# Patient Record
Sex: MALE | Race: WHITE | Employment: OTHER | URBAN - METROPOLITAN AREA
[De-identification: names, ages, dates, MRNs, and addresses within clinical notes are randomized per-mention and may not be internally consistent; named-entity substitution may affect disease eponyms.]

---

## 2017-05-16 PROBLEM — M17.12 ARTHRITIS OF KNEE, LEFT: Status: ACTIVE | Noted: 2017-05-16

## 2021-06-24 ENCOUNTER — OFFICE VISIT (OUTPATIENT)
Dept: PAIN MANAGEMENT | Age: 76
End: 2021-06-24
Payer: MEDICARE

## 2021-06-24 VITALS
HEIGHT: 69 IN | BODY MASS INDEX: 38.51 KG/M2 | DIASTOLIC BLOOD PRESSURE: 62 MMHG | TEMPERATURE: 97.1 F | SYSTOLIC BLOOD PRESSURE: 112 MMHG | WEIGHT: 260 LBS

## 2021-06-24 DIAGNOSIS — G89.4 CHRONIC PAIN SYNDROME: ICD-10-CM

## 2021-06-24 DIAGNOSIS — M17.11 ARTHRITIS OF KNEE, RIGHT: ICD-10-CM

## 2021-06-24 DIAGNOSIS — M17.11 PRIMARY OSTEOARTHRITIS OF RIGHT KNEE: ICD-10-CM

## 2021-06-24 DIAGNOSIS — M47.816 SPONDYLOSIS OF LUMBAR REGION WITHOUT MYELOPATHY OR RADICULOPATHY: ICD-10-CM

## 2021-06-24 PROCEDURE — 1036F TOBACCO NON-USER: CPT | Performed by: NURSE PRACTITIONER

## 2021-06-24 PROCEDURE — 4040F PNEUMOC VAC/ADMIN/RCVD: CPT | Performed by: NURSE PRACTITIONER

## 2021-06-24 PROCEDURE — G8417 CALC BMI ABV UP PARAM F/U: HCPCS | Performed by: NURSE PRACTITIONER

## 2021-06-24 PROCEDURE — 99213 OFFICE O/P EST LOW 20 MIN: CPT | Performed by: NURSE PRACTITIONER

## 2021-06-24 PROCEDURE — G8427 DOCREV CUR MEDS BY ELIG CLIN: HCPCS | Performed by: NURSE PRACTITIONER

## 2021-06-24 PROCEDURE — 1123F ACP DISCUSS/DSCN MKR DOCD: CPT | Performed by: NURSE PRACTITIONER

## 2021-06-24 RX ORDER — OXYCODONE HYDROCHLORIDE AND ACETAMINOPHEN 5; 325 MG/1; MG/1
1 TABLET ORAL 2 TIMES DAILY PRN
Qty: 60 TABLET | Refills: 0 | Status: SHIPPED | OUTPATIENT
Start: 2021-06-29 | End: 2021-07-27 | Stop reason: SDUPTHER

## 2021-06-24 ASSESSMENT — ENCOUNTER SYMPTOMS
SHORTNESS OF BREATH: 0
BLOOD IN STOOL: 0

## 2021-06-24 NOTE — PROGRESS NOTES
Bull Goode  (1945)    2021    Subjective:     Bull Goode is 68 y.o. male who complains today of:    Chief Complaint   Patient presents with    Knee Pain     right     Back Pain         Allergies:  Caffeine, Codeine, Penicillins, and Shellfish-derived products    Past Medical History:   Diagnosis Date    Chicken pox     Chronic back pain     Congenital heart disease     Mumps     Osteoarthritis     Rheumatic fever      Past Surgical History:   Procedure Laterality Date    CARDIAC SURGERY  03/10/2003, 2005    ALSO STENTS WERE PLACED. Family History   Problem Relation Age of Onset    High Blood Pressure Mother     Stroke Mother     High Blood Pressure Father      Social History     Socioeconomic History    Marital status:      Spouse name: Not on file    Number of children: Not on file    Years of education: Not on file    Highest education level: Not on file   Occupational History    Not on file   Tobacco Use    Smoking status: Former Smoker     Types: Cigarettes     Quit date: 3/1/2005     Years since quittin.3    Smokeless tobacco: Never Used   Substance and Sexual Activity    Alcohol use: No     Alcohol/week: 0.0 standard drinks     Comment: RARELY    Drug use: Not on file    Sexual activity: Not on file   Other Topics Concern    Not on file   Social History Narrative    Not on file     Social Determinants of Health     Financial Resource Strain:     Difficulty of Paying Living Expenses:    Food Insecurity:     Worried About [facility] in the Last Year:     [facility] in the Last Year:    Transportation Needs:     Lack of Transportation (Medical):      Lack of Transportation (Non-Medical):    Physical Activity:     Days of Exercise per Week:     Minutes of Exercise per Session:    Stress:     Feeling of Stress :    Social Connections:     Frequency of Communication with Friends and Family:     Frequency of Social Gatherings with Friends and Family:     Attends Mosque Services:     Active Member of Clubs or Organizations:     Attends Club or Organization Meetings:     Marital Status:    Intimate Partner Violence:     Fear of Current or Ex-Partner:     Emotionally Abused:     Physically Abused:     Sexually Abused:        Current Outpatient Medications on File Prior to Visit   Medication Sig Dispense Refill    gabapentin (NEURONTIN) 300 MG capsule Take 1 capsule by mouth nightly for 90 days. 90 capsule 0    Clopidogrel Bisulfate (PLAVIX PO) Take by mouth      Handicap Placard MISC by Does not apply route Valid 9/1/19- 9/1/23 1 each 0    allopurinol (ZYLOPRIM) 100 MG tablet Take 200 mg by mouth daily      aspirin 81 MG tablet Take 81 mg by mouth daily      Omega-3 Fatty Acids (FISH OIL) 1000 MG CPDR Take 2 tablets by mouth 2 times daily      NONFORMULARY as needed Indications: METALAZONE 25MG  1 TAB PRN      omeprazole (PRILOSEC) 20 MG capsule Take 20 mg by mouth daily      ezetimibe-simvastatin (VYTORIN) 10-20 MG per tablet Take 1 tablet by mouth nightly      carvedilol (COREG) 25 MG tablet       ONE TOUCH ULTRA TEST strip   0    ONETOUCH DELICA LANCETS 31C MISC   0    metFORMIN (GLUCOPHAGE) 1000 MG tablet       montelukast (SINGULAIR) 10 MG tablet       NITROSTAT 0.4 MG SL tablet   0    potassium chloride SA (K-DUR;KLOR-CON M) 20 MEQ tablet       spironolactone (ALDACTONE) 25 MG tablet   0    torsemide (DEMADEX) 100 MG tablet        No current facility-administered medications on file prior to visit. Pt presents today for a f/u of chronic low back and right knee pain. Pt feels pain level and functioning improves with prescribed medications and is able to walk without cane. Pt feels that walking aggravates the pain. Pt denies radiating numbness and tingling. Will be getting EGD for previous internal bleeding. Gel injections with Dr. Candelaria Fine in past.  He says he did see ortho.  Cannot have MRI. Told not much that can be done for knee. He says has knee brace and cane on occasion. had cardiac valve repair in July 2020. He has tried Rt SI joint and facet joint injections in the past with relief at that time. Has ICD which he says will need replaced due to low battery. 4/5/21 BL gelone knees              Review of Systems   Constitutional: Negative for appetite change and fever. HENT: Negative for hearing loss. Eyes: Negative for visual disturbance. Respiratory: Negative for shortness of breath. Gastrointestinal: Negative for blood in stool. Genitourinary: Negative for difficulty urinating and hematuria. Musculoskeletal: Positive for arthralgias. Skin: Negative for rash. Neurological: Negative for facial asymmetry. Hematological: Negative for adenopathy. Psychiatric/Behavioral: Negative for self-injury. All other systems reviewed and are negative. Objective:     Vitals:  /62   Temp 97.1 °F (36.2 °C)   Ht 5' 8.5\" (1.74 m)   Wt 260 lb (117.9 kg)   BMI 38.96 kg/m² Pain Score:   8      Physical Exam  Vitals and nursing note reviewed. Pt is alert and oriented x 3. Recent and remote memory is intact. Mood, judgement and affect are normal.  No signs of distress or SOB noted. Visualized skin intact. Sensation intact to light touch. Decreased ROM with flexion and extension of low back.  Tender with palpation to bilateral lumbar spine with positive provacative maneuvers noted.  Negative SLR.   Pt is unable to briefly heel walk and toe walk.   Strength, balance, and coordination are diminished but functional for ambulation. Left knee with decreased ROM.  Tenderness noted with palpation. Mild swelling noted with arthritic deformity noted.  Crepitus with ROM.  Gait antalgic.  Uses cane. Assessment:      Diagnosis Orders   1.  Primary osteoarthritis of right knee  [facility] Physical Therapy [facility]    oxyCODONE-acetaminophen (PERCOCET) 5-325 MG per tablet    CHG DRUG/SUBSTANCE DEFINITIVE QUAL/QUANT NOS 7/MORE   2. Spondylosis of lumbar region without myelopathy or radiculopathy  [facility] Physical [facility]    oxyCODONE-acetaminophen (PERCOCET) 5-325 MG per tablet    CHG DRUG/SUBSTANCE DEFINITIVE QUAL/QUANT NOS 7/MORE   3. Arthritis of knee, right  oxyCODONE-acetaminophen (PERCOCET) 5-325 MG per tablet    CHG DRUG/SUBSTANCE DEFINITIVE QUAL/QUANT NOS 7/MORE   4. Chronic pain syndrome  oxyCODONE-acetaminophen (PERCOCET) 5-325 MG per tablet    CHG DRUG/SUBSTANCE DEFINITIVE QUAL/QUANT NOS 7/MORE       Plan:     Periodic Controlled Substance Monitoring: Possible medication side effects, risk of tolerance/dependence & alternative treatments discussed., No signs of potential drug abuse or diversion identified. , Assessed functional status., Obtaining appropriate analgesic effect of treatment. (Juju Signh, APRN - CNP)    Orders Placed This Encounter   Medications    oxyCODONE-acetaminophen (PERCOCET) 5-325 MG per tablet     Sig: Take 1 tablet by mouth 2 times daily as needed for Pain for up to 30 days. Dispense:  60 tablet     Refill:  0     Reduce doses taken as pain becomes manageable       Orders Placed This Encounter   Procedures   [facility]     Referral Priority:   Routine     Referral Type:   Eval and Treat     Referral Reason:   Specialty Services Required     Requested Specialty:   Physical Therapy     Number of Visits Requested:   1    CHG DRUG/SUBSTANCE DEFINITIVE QUAL/QUANT NOS 7/MORE     Discussed options with the patient today. Continue Percocet and Gabapentin (has 90 day RX).  Will reorder PT for lumbar and knee. Routine ODS today. Took 1 percocet today and 2 yesterday. Will be seeing cardiology in September to see if pacemaker battery needs replaced. All questions were answered. Pt verbalized understanding and agrees with above plan. Utox reviewed and appropriate from 6/18/20.  Narcan declined 4/29/21.     Will

## 2021-07-20 ENCOUNTER — HOSPITAL ENCOUNTER (OUTPATIENT)
Dept: PHYSICAL THERAPY | Age: 76
Setting detail: THERAPIES SERIES
Discharge: HOME | End: 2021-07-20
Payer: MEDICARE

## 2021-07-20 PROCEDURE — 97162 PT EVAL MOD COMPLEX 30 MIN: CPT

## 2021-07-20 PROCEDURE — 97110 THERAPEUTIC EXERCISES: CPT

## 2021-07-20 ASSESSMENT — PAIN DESCRIPTION - FREQUENCY: FREQUENCY: INTERMITTENT

## 2021-07-20 ASSESSMENT — PAIN DESCRIPTION - DESCRIPTORS: DESCRIPTORS: STABBING

## 2021-07-20 ASSESSMENT — PAIN DESCRIPTION - ORIENTATION: ORIENTATION: RIGHT;LOWER

## 2021-07-20 ASSESSMENT — PAIN SCALES - GENERAL: PAINLEVEL_OUTOF10: 6

## 2021-07-20 ASSESSMENT — PAIN DESCRIPTION - LOCATION: LOCATION: BACK

## 2021-07-20 NOTE — PROGRESS NOTES
[address], [address] [place] [address]  QZGIF:[phone]    [x] Certification  [] Recertification [x]  Plan of Care  [] Progress Note [] Discharge      To:  FRANK Arango - AMBROSIO      From:  Kyree Tucker PT  Patient: Clementina Bettencourt     : 1945  Diagnosis: Lumbar spondylosis and R knee OA     Date: 2021  Treatment Diagnosis: LBP with R knee pain and strengt and ROM deficits. Plan of Care/Certification Expiration Date: 21  Progress Report Period from:  2021  to 2021    Total # of Visits to Date: 1   No Show: 0    Canceled Appointment: 0     OBJECTIVE:   Short Term Goals - Time Frame for Short term goals: 2 weeks    Goals Current/Discharge status  Met   Short term goal 1: Decrease lumbar pain 50% to assist with improved functional gains. [] yes  [] no     Long Term Goals - Time Frame for Long term goals : 4-6 weeks  Goals Current/ Discharge status Met   Long term goal 1: Indep HEP for symptom management Written HEP provided  for symptom management   [] yes  [x] no   Long term goal 2: Pt demo improved overall function by reporting greater than 80% per functional survey score Exam: LEFS 23/80= 71% functional   [] yes  [x] no   Long term goal 3: Improve B hip strength 4-/5 to 4/5 to allow patient to perfrom bed mobility with min to no assist. Strength RLE  Comment: Hip 3- to 3/5, knee 4-/5, ankle 4/5  Strength LLE  Comment: Hip 3/5, knee 4-/5, ankle 4/5    [] yes  [x] no   Long term goal 4: Pain-free lumbar AROM to 50% WNL allowing an increase in ADL tolerance. Spine  Lumbar: Flex <25% with pain, Ext <25% with pain, SB 50% with pain on L to the L SB    [] yes  [x] no   Long term goal 5: Pain-free R knee AROM to 0-125 deg  WNL allowing an increase in ADL tolerance.     AROM RLE (degrees)  RLE General AROM: Hip flex 60, knee 0-110, ankle WFL [] yes  [x] no        Body structures, Functions, Activity limitations: Decreased ROM, Decreased strength, Increased pain, Decreased posture  Assessment: The pt's impairments currently limit functional abilities by 29% including his abilities to peform bed mobility, ambulate >5-10  minutes, perform recreational activities, and perform household/work related duties without pain or limitations. Skilled PT required to address above deficits to improve over function and return to prior level of function. Special Tests: SLUMP(-), SLR(pain L>R), KRISTINA(tightness), ELY's(unable to positioin), Crossed SLR(), Scour(pain B), distraction (increase pain),  compression(no change), Knee varus stress(-), valgus stress(-), Macey's(pain), Apleys compression (-)  Prognosis: Good  Discharge Recommendations: Continue to assess pending progress      PT Education: Goals;PT Role;Plan of Care;Home Exercise Program  Patient Education: Diana Lopez provided    PLAN: [x] Evaluate and Treat  Frequency/Duration:  Plan  Times per week: 2  Plan weeks: 4-6  Current Treatment Recommendations: Strengthening, ROM, Home Exercise Program, Manual Therapy - Soft Tissue Mobilization, Modalities     Precautions: cardiac: Pacemaker, MI x 2, stents                          Patient Status:[x] Continue/ Initiate plan of Care    [] Discharge PT. Recommend pt continue with HEP. [] Additional visits requested, Please re-certify for additional visits:          Signature: Electronically signed by Juliana Bui PT on 7/20/21 at 3:06 PM EDT      If you have any questions or concerns, please don't hesitate to call. Thank you for your referral.    I have reviewed this plan of care and certify a need for medically necessary rehabilitation services.     Physician Signature:__________________________________________________________  Date:  Please sign and return

## 2021-07-20 NOTE — PROGRESS NOTES
[facility] ([facility]) Physical Therapy-  [place]  PHYSICAL THERAPY EVALUATION    Date: 2021  Patient Name: Sallie Fierro       MRN: [id number]   Account: [de-identified]   : 1945  (68 y.o.)   Gender: male   Referring Practitioner: FRANK Roque CNP                 Diagnosis: Lumbar spondylosis and R knee OA  Treatment Diagnosis: LBP with R knee pain and strengt and ROM deficits. Additional Pertinent Hx: OA, cardiac stents, pacemaker, DM, COPD, carpel tunnel, MI x 2          Past Medical History:  has a past medical history of Chicken pox, Chronic back pain, Congenital heart disease, Mumps, Osteoarthritis, and Rheumatic fever. Past Surgical History:   has a past surgical history that includes Cardiac surgery (03/10/2003, 2005). Vital Signs  Patient Currently in Pain: Yes   Pain Screening  Patient Currently in Pain: Yes  Pain Assessment  Pain Assessment: 0-10  Pain Level: 6 (Pt reports 10/10 back pain with standing </=10-15 min. R knee 5/10 at rest, increases with pain.)  Pain Location: Back  Pain Orientation: Right; Lower  Pain Radiating Towards: denies NT or radicular symptoms. Pain Descriptors: Stabbing  Pain Frequency: Intermittent  Functional Pain Assessment:  (25% function currently x 1 year. Pt reports 50% function 1 year ago.)  Non-Pharmaceutical Pain Intervention(s): Cold applied      Lives With: Friend(s)  Home Layout: Two level  Home Access: Stairs to enter with rails  Entrance Stairs - Number of Steps: 9 to bedroom downstairs  Bathroom Shower/Tub: Tub/Shower unit  Home Equipment: Rolling walker;Cane  ADL Assistance: Independent  Homemaking Assistance: Independent  Homemaking Responsibilities: Yes  Ambulation Assistance: Independent  Transfer Assistance: Independent  Active : Yes  Mode of Transportation: Car  Occupation: Retired  Leisure & Hobbies: fair and steam shows,      Subjective:  Subjective: Pt reports long hx of back pain and R knee pain.   Cortisone injection R cupping*  Other: IDN contraindicated for 4 weeks after RFA 7/21/21  *Indicates exercise,modality, or manual techniques to be initiated when appropriate  Assessment: Body structures, Functions, Activity limitations: Decreased ROM, Decreased strength, Increased pain, Decreased posture  Assessment: The pt's impairments currently limit functional abilities by 29% including his abilities to peform bed mobility, ambulate >5-10  minutes, perform recreational activities, and perform household/work related duties without pain or limitations. Skilled PT required to address above deficits to improve over function and return to prior level of function. Prognosis: Good  Discharge Recommendations: Continue to assess pending progress        Decision Making: Medium Complexity  History: OA, cardiac stents, MI x 2, COPD, DM, Pacemaker  Exam: LEFS 23/80= 71% functional  Clinical Presentation: evolving        Plan  Frequency/Duration:  Plan  Times per week: 2  Plan weeks: 4-6  Current Treatment Recommendations: Strengthening, ROM, Home Exercise Program, Manual Therapy - Soft Tissue Mobilization, Modalities     Patient Education  New Education Provided: PT Education: Goals;PT Role;Plan of Care;Home Exercise Program  Patient Education: Rochelle Jacobs provided    POST-PAIN     Pain Rating (0-10 pain scale):  0 /10  Location and pain description same as pre-treatment unless indicated. Action: [x] NA  [] Call Physician  [] Perform HEP  [] Meds as prescribed    Evaluation and patient rights have been reviewed and patient agrees with plan of care.   Yes  [x]  No  []   Explain:       Collins Fall Risk Assessment  Risk Factor Scale  Score   History of Falls [] Yes  [] No 25  0 0   Secondary Diagnosis [] Yes  [x] No 15  0 0   Ambulatory Aid [] Furniture  [] Crutches/cane/walker  [x] None/bedrest/wheelchair/nurse 30  15  0 0   IV/Heparin Lock [] Yes  [x] No 20  0 0   Gait/Transferring [] Impaired  [x] Weak  [] Normal/bedrest/immobile 20  10  0 10 Mental Status [] Forgets limitations  [x] Oriented to own ability 15  0 0      Total:10     Based on the Assessment score: check the appropriate box. [x]  No intervention needed   Low =   Score of 0-24  []  Use standard prevention interventions Moderate =  Score of 24-44   [] Discuss fall prevention strategies   [] Indicate moderate falls risk on eval  []  Use high risk prevention interventions High = Score of 45 and higher   [] Discuss fall prevention strategies   [] Provide supervision during treatment time    Goals  Short term goals  Time Frame for Short term goals: 2 weeks  Short term goal 1: Decrease lumbar pain 50% to assist with improved functional gains. Long term goals  Time Frame for Long term goals : 4-6 weeks  Long term goal 1: Indep HEP for symptom management  Long term goal 2: Pt demo improved overall function by reporting greater than 80% per functional survey score  Long term goal 3: Improve B hip strength 4-/5 to 4/5 to allow patient to perfrom bed mobility with min to no assist.  Long term goal 4: Pain-free lumbar AROM to 50% WNL allowing an increase in ADL tolerance. Long term goal 5: Pain-free R knee AROM to 0-125 deg  WNL allowing an increase in ADL tolerance.          PT Individual Minutes  Time In: 4864  Time Out: 1355  Minutes: 43  Timed Code Treatment Minutes: 8 Minutes  Procedure Minutes:35 min eval     Timed Activity Minutes Units   Ther Ex 8 1       Electronically signed by Shine Ledbetter PT on 7/20/21 at 2:55 PM EDT

## 2021-07-27 ENCOUNTER — HOSPITAL ENCOUNTER (OUTPATIENT)
Dept: PHYSICAL THERAPY | Age: 76
Setting detail: THERAPIES SERIES
Discharge: HOME | End: 2021-07-27
Payer: MEDICARE

## 2021-07-27 ENCOUNTER — OFFICE VISIT (OUTPATIENT)
Dept: PAIN MANAGEMENT | Age: 76
End: 2021-07-27
Payer: MEDICARE

## 2021-07-27 VITALS
WEIGHT: 260 LBS | DIASTOLIC BLOOD PRESSURE: 60 MMHG | BODY MASS INDEX: 39.4 KG/M2 | HEIGHT: 68 IN | SYSTOLIC BLOOD PRESSURE: 116 MMHG | TEMPERATURE: 97.3 F

## 2021-07-27 DIAGNOSIS — M17.11 PRIMARY OSTEOARTHRITIS OF RIGHT KNEE: ICD-10-CM

## 2021-07-27 DIAGNOSIS — G89.4 CHRONIC PAIN SYNDROME: ICD-10-CM

## 2021-07-27 DIAGNOSIS — M47.817 LUMBOSACRAL SPONDYLOSIS WITHOUT MYELOPATHY: ICD-10-CM

## 2021-07-27 DIAGNOSIS — M17.11 ARTHRITIS OF KNEE, RIGHT: Primary | ICD-10-CM

## 2021-07-27 DIAGNOSIS — M47.816 SPONDYLOSIS OF LUMBAR REGION WITHOUT MYELOPATHY OR RADICULOPATHY: ICD-10-CM

## 2021-07-27 PROCEDURE — 97110 THERAPEUTIC EXERCISES: CPT

## 2021-07-27 PROCEDURE — G8427 DOCREV CUR MEDS BY ELIG CLIN: HCPCS | Performed by: PAIN MEDICINE

## 2021-07-27 PROCEDURE — 99214 OFFICE O/P EST MOD 30 MIN: CPT | Performed by: PAIN MEDICINE

## 2021-07-27 PROCEDURE — 4040F PNEUMOC VAC/ADMIN/RCVD: CPT | Performed by: PAIN MEDICINE

## 2021-07-27 PROCEDURE — 1123F ACP DISCUSS/DSCN MKR DOCD: CPT | Performed by: PAIN MEDICINE

## 2021-07-27 PROCEDURE — 1036F TOBACCO NON-USER: CPT | Performed by: PAIN MEDICINE

## 2021-07-27 PROCEDURE — G8417 CALC BMI ABV UP PARAM F/U: HCPCS | Performed by: PAIN MEDICINE

## 2021-07-27 RX ORDER — OXYCODONE HYDROCHLORIDE AND ACETAMINOPHEN 5; 325 MG/1; MG/1
1 TABLET ORAL 2 TIMES DAILY PRN
Qty: 60 TABLET | Refills: 0 | Status: SHIPPED | OUTPATIENT
Start: 2021-07-27 | End: 2021-08-26 | Stop reason: SDUPTHER

## 2021-07-27 ASSESSMENT — ENCOUNTER SYMPTOMS
NAUSEA: 0
SHORTNESS OF BREATH: 0
CONSTIPATION: 0
BACK PAIN: 0
DIARRHEA: 0

## 2021-07-27 ASSESSMENT — PAIN DESCRIPTION - FREQUENCY: FREQUENCY: INTERMITTENT

## 2021-07-27 ASSESSMENT — PAIN DESCRIPTION - PROGRESSION: CLINICAL_PROGRESSION: NOT CHANGED

## 2021-07-27 ASSESSMENT — PAIN DESCRIPTION - DESCRIPTORS: DESCRIPTORS: STABBING

## 2021-07-27 ASSESSMENT — PAIN DESCRIPTION - PAIN TYPE: TYPE: CHRONIC PAIN

## 2021-07-27 ASSESSMENT — PAIN DESCRIPTION - LOCATION: LOCATION: BACK;KNEE

## 2021-07-27 ASSESSMENT — PAIN DESCRIPTION - ONSET: ONSET: AWAKENED FROM SLEEP

## 2021-07-27 ASSESSMENT — PAIN SCALES - GENERAL: PAINLEVEL_OUTOF10: 4

## 2021-07-27 ASSESSMENT — PAIN DESCRIPTION - ORIENTATION: ORIENTATION: RIGHT;LOWER

## 2021-07-27 NOTE — PROGRESS NOTES
Pauly Carlson  [address]  DQJOH:[phone]        Date: 2021  Patient: Jud Hobson  : 1945  ACCT #: [de-identified]  Referring Practitioner: FRANK Zamora CNP  Diagnosis: Lumbar spondylosis and R knee OA  Treatment Diagnosis: LBP with R knee pain and strengt and ROM deficits. Visit Information:  PT Visit Information  Onset Date: 20 (Pt reports long history and worsening over the past year)  PT Insurance Information: Medicare/Medical [place]  Total # of Visits Approved:  (BMN)  Total # of Visits to Date: 2  Plan of Care/Certification Expiration Date: 21  No Show: 0  Canceled Appointment: 0  Progress Note Counter:  (PN 21)    Subjective: Pt states he is not doing bad today but he has not been on his feet today. The other day it felt like someone was stabbing in his right knee(10/10 pain) but he was on his feet a lot. He c/o chronic low back pain with right side worse than L. Comments: xray=R knee moderate to severe medial patellofemoral OA  HEP Compliance:  [] Good [x] Fair [] Poor [] Reports not doing due to:    Vital Signs  Patient Currently in Pain: Yes   Pain Screening  Patient Currently in Pain: Yes  Pain Assessment  Pain Assessment: 0-10  Pain Level: 4  Pain Type: Chronic pain  Pain Location: Back;Knee  Pain Orientation: Right; Lower  Pain Descriptors: Stabbing  Pain Frequency: Intermittent  Pain Onset: Awakened from sleep  Clinical Progression: Not changed    OBJECTIVE:   Exercises  Exercise 2: ham stretch 5 x 20 secs  Exercise 3: LTR x 10 each, 5 secs  Exercise 4: SLR 2 x 5, 5 secs  Exercise 5: bridging x 10, 5 secs        Strength: [x] NT  [] MMT completed:     ROM: [x] NT  [] ROM measurements:      *Indicates exercise, modality, or manual techniques to be initiated when appropriate    Assessment:         Body structures, Functions, Activity limitations: Decreased ROM, Decreased strength, Increased pain, Decreased posture  Assessment: Instructed pt in gentle lumbar ROM and knee stability exercises. He tolerated SLR and bridging exercises to increase core and LE strength. C/o fatigue noted after completing listed exercises. Will work towards improving function and strength. Treatment Diagnosis: LBP with R knee pain and strengt and ROM deficits. Prognosis: Good       Goals:  Short term goals  Time Frame for Short term goals: 2 weeks  Short term goal 1: Decrease lumbar pain 50% to assist with improved functional gains. Long term goals  Time Frame for Long term goals : 4-6 weeks  Long term goal 1: Indep HEP for symptom management  Long term goal 2: Pt demo improved overall function by reporting greater than 80% per functional survey score  Long term goal 3: Improve B hip strength 4-/5 to 4/5 to allow patient to perfrom bed mobility with min to no assist.  Long term goal 4: Pain-free lumbar AROM to 50% WNL allowing an increase in ADL tolerance. Long term goal 5: Pain-free R knee AROM to 0-125 deg  WNL allowing an increase in ADL tolerance. Progress toward goals: Initiated PT POC this visit. POST-PAIN       Pain Rating (0-10 pain scale):   4/10   Location and pain description same as pre-treatment unless indicated. Action: [] NA   [x] Perform HEP  [x] Meds as prescribed  [] Modalities as prescribed   [] Call Physician     Frequency/Duration:  Plan  Times per week: 2  Plan weeks: 4-6  Current Treatment Recommendations: Strengthening, ROM, Home Exercise Program, Manual Therapy - Soft Tissue Mobilization, Modalities     Pt to continue current HEP. See objective section for any therapeutic exercise changes, additions or modifications this date.     PT Individual Minutes  Time In: 4249  Time Out: 1501  Minutes: 29  Timed Code Treatment Minutes: 29 Minutes     Timed Activity Minutes Units   Ther Ex 29 2     Signature:  Electronically signed by Arpit Abarca PTA on 7/27/21 at 2:39 PM EDT

## 2021-07-27 NOTE — PROGRESS NOTES
[facility]) Physicians  Neurosurgery and Pain [facility]  [address] [facility], [place], [address] [facility] , [address] [facility], [address]: [phone]  F: [phone]        Joellen Cedeno  (1945)    7/27/2021    Subjective:     Joellen Cedeno is 68 y.o. male who complains today of:     Chief Complaint   Patient presents with    Knee Pain    Shoulder Pain    Back Pain   Sandie Hector is here today for follow-up. He is having n a lot of right knee pain. Worse with standing walking. Better when he sits. He has chronic low back pain worse with bending activity. Achiness spasms. He has had conservative treatment in the past including interventional pain management. On Percocet twice a day for his chronic pain. A lot of pain in the right inside aspect of the knee. He has arthritis. Has multiple medical problems. Has a knee brace and uses a cane occasionally no side effects no aberrant behavior the Percocet. To help him function, do his activities daily living, help with quality life. Pain with meds 3 out of 10 without 8 out of 10. He cannot have an MRI. Achy pain. Plan: We discussed options in detail. We'll set him up for right knee injection. Continue Percocet 5 mg twice a day #60 for his chronic pain. He has failed conservative treatment past. Question answered, chart reviewed, continue home excise program.      Allergies:  Caffeine, Codeine, Penicillins, and Shellfish-derived products    Past Medical History:   Diagnosis Date    Chicken pox     Chronic back pain     Congenital heart disease     Mumps     Osteoarthritis     Rheumatic fever      Past Surgical History:   Procedure Laterality Date    CARDIAC SURGERY  03/10/2003, 03/11/2005    ALSO STENTS WERE PLACED.       Family History   Problem Relation Age of Onset    High Blood Pressure Mother     Stroke Mother     High Blood Pressure Father      Social History     Socioeconomic History    Marital status:      Spouse name: Not on file  Number of children: Not on file    Years of education: Not on file    Highest education level: Not on file   Occupational History    Not on file   Tobacco Use    Smoking status: Former Smoker     Packs/day: 0.50     Years: 45.00     Pack years: 22.50     Types: Cigarettes     Quit date: 3/1/2005     Years since quittin.4    Smokeless tobacco: Never Used   Substance and Sexual Activity    Alcohol use: No     Alcohol/week: 0.0 standard drinks     Comment: RARELY    Drug use: Not on file    Sexual activity: Not on file   Other Topics Concern    Not on file   Social History Narrative    Not on file     Social Determinants of Health     Financial Resource Strain:     Difficulty of Paying Living Expenses:    Food Insecurity:     Worried About Running Out of Food in the Last Year:     [address] in the Last Year:    Transportation Needs:     Lack of Transportation (Medical):  Lack of Transportation (Non-Medical):    Physical Activity:     Days of Exercise per Week:     Minutes of Exercise per Session:    Stress:     Feeling of Stress :    Social Connections:     Frequency of Communication with Friends and Family:     Frequency of Social Gatherings with Friends and Family:     Attends Mandaeism Services:     Active Member of Clubs or Organizations:     Attends Club or Organization Meetings:     Marital Status:    Intimate Partner Violence:     Fear of Current or Ex-Partner:     Emotionally Abused:     Physically Abused:     Sexually Abused:        Current Outpatient Medications on File Prior to Visit   Medication Sig Dispense Refill    gabapentin (NEURONTIN) 300 MG capsule Take 1 capsule by mouth nightly for 90 days.  90 capsule 0    Clopidogrel Bisulfate (PLAVIX PO) Take by mouth      Handicap Placard MISC by Does not apply route Valid 19- 23 1 each 0    allopurinol (ZYLOPRIM) 100 MG tablet Take 200 mg by mouth daily      aspirin 81 MG tablet Take 81 mg by mouth daily      Omega-3 Fatty Acids (FISH OIL) 1000 MG CPDR Take 2 tablets by mouth 2 times daily      NONFORMULARY as needed Indications: METALAZONE 25MG  1 TAB PRN      omeprazole (PRILOSEC) 20 MG capsule Take 20 mg by mouth daily      ezetimibe-simvastatin (VYTORIN) 10-20 MG per tablet Take 1 tablet by mouth nightly      carvedilol (COREG) 25 MG tablet       ONE TOUCH ULTRA TEST strip   0    ONETOUCH DELICA LANCETS 63M MISC   0    metFORMIN (GLUCOPHAGE) 1000 MG tablet       montelukast (SINGULAIR) 10 MG tablet       NITROSTAT 0.4 MG SL tablet   0    potassium chloride SA (K-DUR;KLOR-CON M) 20 MEQ tablet       spironolactone (ALDACTONE) 25 MG tablet   0    torsemide (DEMADEX) 100 MG tablet        No current facility-administered medications on file prior to visit. HPI    Review of Systems   Constitutional: Negative for fever. HENT: Negative for hearing loss. Respiratory: Negative for shortness of breath. Gastrointestinal: Negative for constipation, diarrhea and nausea. Genitourinary: Negative for difficulty urinating. Musculoskeletal: Negative for back pain and neck pain. Skin: Negative for rash. Neurological: Negative for headaches. Hematological: Does not bruise/bleed easily. Psychiatric/Behavioral: Negative for sleep disturbance. Objective:     Vitals:  /60   Temp 97.3 °F (36.3 °C)   Ht 5' 8\" (1.727 m)   Wt 260 lb (117.9 kg)   BMI 39.53 kg/m² Pain Score:   7      Physical Exam  Patient alert and oriented times three, recent and remote memory intact, mood and affect normal, judgement and insight normal. Strength functional for ambulation. Balance and coordinational functional. Visualized skin intact. No visualized cyanosis, pulses intact. Cranial nerves 2-12 grossly intact. No obvious upper motor neuron signs seen. Sensation intact to light touch.  Tender right medial knee, decreased range of motion, antalgic gait.-slr, decreased range of motion

## 2021-07-29 ENCOUNTER — HOSPITAL ENCOUNTER (OUTPATIENT)
Dept: PHYSICAL THERAPY | Age: 76
Setting detail: THERAPIES SERIES
Discharge: HOME | End: 2021-07-29
Payer: MEDICARE

## 2021-07-29 PROCEDURE — 97110 THERAPEUTIC EXERCISES: CPT

## 2021-07-29 ASSESSMENT — PAIN DESCRIPTION - DESCRIPTORS: DESCRIPTORS: STABBING

## 2021-07-29 ASSESSMENT — PAIN - FUNCTIONAL ASSESSMENT: PAIN_FUNCTIONAL_ASSESSMENT: PREVENTS OR INTERFERES SOME ACTIVE ACTIVITIES AND ADLS

## 2021-07-29 ASSESSMENT — PAIN DESCRIPTION - ONSET: ONSET: ON-GOING

## 2021-07-29 ASSESSMENT — PAIN DESCRIPTION - FREQUENCY: FREQUENCY: INTERMITTENT

## 2021-07-29 ASSESSMENT — PAIN DESCRIPTION - PAIN TYPE: TYPE: CHRONIC PAIN

## 2021-07-29 ASSESSMENT — PAIN SCALES - GENERAL: PAINLEVEL_OUTOF10: 4

## 2021-07-29 ASSESSMENT — PAIN DESCRIPTION - LOCATION: LOCATION: BACK;KNEE

## 2021-07-29 ASSESSMENT — PAIN DESCRIPTION - ORIENTATION: ORIENTATION: RIGHT;LOWER

## 2021-07-29 ASSESSMENT — PAIN DESCRIPTION - PROGRESSION: CLINICAL_PROGRESSION: NOT CHANGED

## 2021-07-29 NOTE — PROGRESS NOTES
Andrea Carlson  [address]  IVVHV:[phone]        Date: 2021  Patient: Jeniffer Bailey  : 1945  ACCT #: [de-identified]  Referring Practitioner: FRANK Stokes CNP  Diagnosis: Lumbar spondylosis and R knee OA  Treatment Diagnosis: LBP with R knee pain and strengt and ROM deficits. Visit Information:  PT Visit Information  Onset Date: 20  PT Insurance Information: Medicare/Medical [place]  Total # of Visits Approved:  (BMN)  Total # of Visits to Date: 3  Plan of Care/Certification Expiration Date: 21  No Show: 0  Canceled Appointment: 0  Progress Note Counter: 3/8 (PN 21)    Subjective: Pt notes he felt fine after first session. He c/o increased R shoulder pain today, not sure why(8/10 pain). Low back and knee doing okay. Comments: xray=R knee moderate to severe medial patellofemoral OA  HEP Compliance:  [] Good [x] Fair [] Poor [] Reports not doing due to:    Vital Signs  Patient Currently in Pain: Yes   Pain Screening  Patient Currently in Pain: Yes  Pain Assessment  Pain Level: 4  Pain Type: Chronic pain  Pain Location: Back;Knee  Pain Orientation: Right; Lower (R knee)  Pain Descriptors: Stabbing  Pain Frequency: Intermittent  Pain Onset: On-going  Clinical Progression: Not changed  Functional Pain Assessment: Prevents or interferes some active activities and ADLs    OBJECTIVE:   Exercises  Exercise 2: ham stretch 5 x 20 secs  Exercise 3: LTR x 10 each, 5 secs  Exercise 4: SLR 2 x 5, 5 secs  Exercise 5: bridging x 10, 5 secs  Exercise 6: hip abduction x 15, 5 secs Blue T band  Exercise 8: mini squats with UE support x 10, 3 secs       Strength: [x] NT  [] MMT completed:        ROM: [x] NT  [] ROM measurements:      *Indicates exercise, modality, or manual techniques to be initiated when appropriate    Assessment:         Body structures, Functions, Activity limitations: Decreased ROM, Decreased strength, Increased pain, Decreased posture  Assessment: Reviewed previous lumbar-LE ther ex this session. Pt performing gentle stretches such as LTR and hamstring stretch for improving mobility. He demonstrates decreased hamstring length, most notably on R LE. He was able to progress to closed kinetic chain exercises to increase hip and LE strength. Reported fatigue after completing listed exercises. Treatment Diagnosis: LBP with R knee pain and strengt and ROM deficits. Prognosis: Good       Goals:  Short term goals  Time Frame for Short term goals: 2 weeks  Short term goal 1: Decrease lumbar pain 50% to assist with improved functional gains. Long term goals  Time Frame for Long term goals : 4-6 weeks  Long term goal 1: Indep HEP for symptom management  Long term goal 2: Pt demo improved overall function by reporting greater than 80% per functional survey score  Long term goal 3: Improve B hip strength 4-/5 to 4/5 to allow patient to perfrom bed mobility with min to no assist.  Long term goal 4: Pain-free lumbar AROM to 50% WNL allowing an increase in ADL tolerance. Long term goal 5: Pain-free R knee AROM to 0-125 deg  WNL allowing an increase in ADL tolerance. Progress toward goals: Pt completed SLR and bridging exercises for improving hip strength and meeting long term goal 3. POST-PAIN       Pain Rating (0-10 pain scale):   4/10   Location and pain description same as pre-treatment unless indicated. Action: [] NA   [x] Perform HEP  [x] Meds as prescribed  [] Modalities as prescribed   [] Call Physician     Frequency/Duration:  Plan  Times per week: 2  Plan weeks: 4-6  Current Treatment Recommendations: Strengthening, ROM, Home Exercise Program, Manual Therapy - Soft Tissue Mobilization, Modalities     Pt to continue current HEP. See objective section for any therapeutic exercise changes, additions or modifications this date.     PT Individual Minutes  Time In: 1308  Time Out: 1340  Minutes: 32  Timed Code Treatment Minutes: 32 Minutes     Timed Activity Minutes Units   Ther Ex 32 2     Signature:  Electronically signed by Jayden Valdovinos PTA on 7/29/21 at 1:10 PM EDT

## 2021-08-02 ENCOUNTER — HOSPITAL ENCOUNTER (OUTPATIENT)
Dept: PHYSICAL THERAPY | Age: 76
Setting detail: THERAPIES SERIES
Discharge: HOME | End: 2021-08-02
Payer: MEDICARE

## 2021-08-02 PROCEDURE — 97110 THERAPEUTIC EXERCISES: CPT

## 2021-08-02 ASSESSMENT — PAIN DESCRIPTION - ORIENTATION: ORIENTATION: LOWER

## 2021-08-02 ASSESSMENT — PAIN DESCRIPTION - LOCATION: LOCATION: BACK;KNEE

## 2021-08-02 ASSESSMENT — PAIN SCALES - GENERAL: PAINLEVEL_OUTOF10: 5

## 2021-08-02 ASSESSMENT — PAIN DESCRIPTION - DESCRIPTORS: DESCRIPTORS: SORE;STABBING

## 2021-08-02 ASSESSMENT — PAIN DESCRIPTION - PROGRESSION: CLINICAL_PROGRESSION: NOT CHANGED

## 2021-08-02 NOTE — PROGRESS NOTES
[facility] Dr[facility] Carlson  [address]  VQVNJ:[phone]        Date: 2021  Patient: Tanya Ortega  : 1945  ACCT #: [de-identified]  Referring Practitioner: FRANK Dutta CNP  Diagnosis: Lumbar spondylosis and R knee OA  Treatment Diagnosis: LBP with R knee pain and strengt and ROM deficits. Visit Information:  PT Visit Information  Onset Date: 20  PT Insurance Information: Medicare/Medical [place]  Total # of Visits Approved:  (BMN)  Total # of Visits to Date: 4  Plan of Care/Certification Expiration Date: 21  No Show: 0  Canceled Appointment: 0  Progress Note Counter:  (PN 21)    Subjective: Pt is having increased pain in his right shoulder/upper arm and right knee today (6-/10). His low back isn't bad (5/10). He experienced increased pain after the last PT visit and also been having a tough time walking at the stores. He tries to use the motorized carts when they are available. Comments: xray=R knee moderate to severe medial patellofemoral OA  HEP Compliance:  [x] Good [] Fair [] Poor [] Reports not doing due to:    Vital Signs  Patient Currently in Pain: Yes   Pain Screening  Patient Currently in Pain: Yes  Pain Assessment  Pain Assessment: 0-10  Pain Level: 5  Pain Location: Back;Knee (R knee)  Pain Orientation: Lower  Pain Descriptors: Sore;Stabbing  Clinical Progression: Not changed    OBJECTIVE:   Exercises  Exercise 2: ham stretch 5 x 20 secs  Exercise 3: LTR x 10 each, 5 secs  Exercise 4: SLR 2 x 5, 5 secs  Exercise 5: bridging x 10, 5 secs  Exercise 6: hip abduction x 15, 5 secs Blue T band  Exercise 7: hip adduction x 15, 5 sec    *Indicates exercise, modality, or manual techniques to be initiated when appropriate    Assessment:    Body structures, Functions, Activity limitations: Decreased ROM, Decreased strength, Increased pain, Decreased posture  Assessment: Pt performed LTR and hamstring stretch to improve mobility - decreased B hamstring length noted. Held mini squats today. Added hip add isos. Fatigued post ex's. Treatment Diagnosis: LBP with R knee pain and strengt and ROM deficits. Prognosis: Good    Goals:  Short term goals  Time Frame for Short term goals: 2 weeks  Short term goal 1: Decrease lumbar pain 50% to assist with improved functional gains. Long term goals  Time Frame for Long term goals : 4-6 weeks  Long term goal 1: Indep HEP for symptom management  Long term goal 2: Pt demo improved overall function by reporting greater than 80% per functional survey score  Long term goal 3: Improve B hip strength 4-/5 to 4/5 to allow patient to perfrom bed mobility with min to no assist.  Long term goal 4: Pain-free lumbar AROM to 50% WNL allowing an increase in ADL tolerance. Long term goal 5: Pain-free R knee AROM to 0-125 deg  WNL allowing an increase in ADL tolerance. Progress toward goals: ongoing, slow progressions towards goals due to pain and fatigue     POST-PAIN       Pain Rating (0-10 pain scale):   5/10   Location and pain description same as pre-treatment unless indicated. Action: [] NA   [x] Perform HEP  [x] Meds as prescribed  [] Modalities as prescribed   [] Call Physician     Frequency/Duration:  Plan  Times per week: 2  Plan weeks: 4-6  Current Treatment Recommendations: Strengthening, ROM, Home Exercise Program, Manual Therapy - Soft Tissue Mobilization, Modalities    Pt to continue current HEP. See objective section for any therapeutic exercise changes, additions or modifications this date.     PT Individual Minutes  Time In: 7544  Time Out: 5060  Minutes: 35  Timed Code Treatment Minutes: 33 Minutes     Timed Activity Minutes Units   Ther Ex 33 2       Signature:  Electronically signed by Chana Srivastava PTA on 8/2/21 at 2:05 PM EDT

## 2021-08-05 ENCOUNTER — HOSPITAL ENCOUNTER (OUTPATIENT)
Dept: PHYSICAL THERAPY | Age: 76
Setting detail: THERAPIES SERIES
Discharge: HOME | End: 2021-08-05
Payer: MEDICARE

## 2021-08-05 PROCEDURE — 97110 THERAPEUTIC EXERCISES: CPT

## 2021-08-05 ASSESSMENT — PAIN DESCRIPTION - PAIN TYPE: TYPE: CHRONIC PAIN

## 2021-08-05 ASSESSMENT — PAIN DESCRIPTION - ONSET: ONSET: ON-GOING

## 2021-08-05 ASSESSMENT — PAIN DESCRIPTION - LOCATION: LOCATION: BACK

## 2021-08-05 ASSESSMENT — PAIN - FUNCTIONAL ASSESSMENT: PAIN_FUNCTIONAL_ASSESSMENT: PREVENTS OR INTERFERES SOME ACTIVE ACTIVITIES AND ADLS

## 2021-08-05 ASSESSMENT — PAIN DESCRIPTION - FREQUENCY: FREQUENCY: INTERMITTENT

## 2021-08-05 ASSESSMENT — PAIN DESCRIPTION - ORIENTATION: ORIENTATION: LOWER

## 2021-08-05 ASSESSMENT — PAIN SCALES - GENERAL: PAINLEVEL_OUTOF10: 5

## 2021-08-05 ASSESSMENT — PAIN DESCRIPTION - DESCRIPTORS: DESCRIPTORS: SORE

## 2021-08-05 ASSESSMENT — PAIN DESCRIPTION - PROGRESSION: CLINICAL_PROGRESSION: NOT CHANGED

## 2021-08-05 NOTE — PROGRESS NOTES
when appropriate    Assessment: Body structures, Functions, Activity limitations: Decreased ROM, Decreased strength, Increased pain, Decreased posture  Assessment: Pt continued with progression of supine lumbar, knee ROM/stability exercises. He is tolerating hip isometrics and quadriceps strengthening exercises. He declined standing exercises d/t previous issues with exacerbation of knee pain. Treatment Diagnosis: LBP with R knee pain and strengt and ROM deficits. Prognosis: Good       Goals:  Short term goals  Time Frame for Short term goals: 2 weeks  Short term goal 1: Decrease lumbar pain 50% to assist with improved functional gains. Long term goals  Time Frame for Long term goals : 4-6 weeks  Long term goal 1: Indep HEP for symptom management  Long term goal 2: Pt demo improved overall function by reporting greater than 80% per functional survey score  Long term goal 3: Improve B hip strength 4-/5 to 4/5 to allow patient to perfrom bed mobility with min to no assist.  Long term goal 4: Pain-free lumbar AROM to 50% WNL allowing an increase in ADL tolerance. Long term goal 5: Pain-free R knee AROM to 0-125 deg  WNL allowing an increase in ADL tolerance. Progress toward goals: Pt continued performing lumbar mobility exercises for meeting LTG 4. POST-PAIN       Pain Rating (0-10 pain scale):   5/10   Location and pain description same as pre-treatment unless indicated. Action: [] NA   [x] Perform HEP  [x] Meds as prescribed  [] Modalities as prescribed   [] Call Physician     Frequency/Duration:  Plan  Times per week: 2  Plan weeks: 4-6  Current Treatment Recommendations: Strengthening, ROM, Home Exercise Program, Manual Therapy - Soft Tissue Mobilization, Modalities     Pt to continue current HEP. See objective section for any therapeutic exercise changes, additions or modifications this date.     PT Individual Minutes  Time In: 5750  Time Out: [address]  Minutes: 29  Timed Code Treatment Minutes: 29 Minutes    Timed Activity Minutes Units   Ther Ex 29 2     Signature:  Electronically signed by Aniya Arredondo PTA on 8/5/21 at 1:10 PM EDT

## 2021-08-09 ENCOUNTER — PROCEDURE VISIT (OUTPATIENT)
Dept: PAIN MANAGEMENT | Age: 76
End: 2021-08-09
Payer: MEDICARE

## 2021-08-09 DIAGNOSIS — M17.11 ARTHRITIS OF KNEE, RIGHT: ICD-10-CM

## 2021-08-09 PROCEDURE — 20610 DRAIN/INJ JOINT/BURSA W/O US: CPT | Performed by: PAIN MEDICINE

## 2021-08-09 PROCEDURE — 77002 NEEDLE LOCALIZATION BY XRAY: CPT | Performed by: PAIN MEDICINE

## 2021-08-09 RX ORDER — BETAMETHASONE SODIUM PHOSPHATE AND BETAMETHASONE ACETATE 3; 3 MG/ML; MG/ML
6 INJECTION, SUSPENSION INTRA-ARTICULAR; INTRALESIONAL; INTRAMUSCULAR; SOFT TISSUE ONCE
Status: COMPLETED | OUTPATIENT
Start: 2021-08-09 | End: 2021-08-09

## 2021-08-09 RX ORDER — LIDOCAINE HYDROCHLORIDE 10 MG/ML
2 INJECTION, SOLUTION EPIDURAL; INFILTRATION; INTRACAUDAL; PERINEURAL ONCE
Status: COMPLETED | OUTPATIENT
Start: 2021-08-09 | End: 2021-08-09

## 2021-08-09 RX ADMIN — LIDOCAINE HYDROCHLORIDE 2 ML: 10 INJECTION, SOLUTION EPIDURAL; INFILTRATION; INTRACAUDAL; PERINEURAL at 13:46

## 2021-08-09 RX ADMIN — BETAMETHASONE SODIUM PHOSPHATE AND BETAMETHASONE ACETATE 6 MG: 3; 3 INJECTION, SUSPENSION INTRA-ARTICULAR; INTRALESIONAL; INTRAMUSCULAR; SOFT TISSUE at 13:47

## 2021-08-09 NOTE — PROGRESS NOTES
[facility] [employer]  Spine Surgery  Advanced Pain Management           Provider: Ramirez Guido DO          Patient Name: Anna Will : 1945        Date: 2021         PROCEDURE: Right knee injection under fluoroscopic guidance    Description of Procedure: The area was cleaned with Betadine and alcohol. Sterile technique  was used. Landmarks were identified. Superolateral approach was used. After negative aspiration,  6 mg of Celestone along with 2 mL  of 1% preservative-free lidocaine was injected without difficulty. The  patient  tolerated the procedure well. No obvious complications occurred during the procedure. Calvin [address], [address] [facility], [address] [place] [address]  Phone [phone]/[email or web address]

## 2021-08-10 ENCOUNTER — HOSPITAL ENCOUNTER (OUTPATIENT)
Dept: PHYSICAL THERAPY | Age: 76
Setting detail: THERAPIES SERIES
Discharge: HOME | End: 2021-08-10
Payer: MEDICARE

## 2021-08-10 PROCEDURE — 97110 THERAPEUTIC EXERCISES: CPT

## 2021-08-10 ASSESSMENT — PAIN DESCRIPTION - PROGRESSION: CLINICAL_PROGRESSION: NOT CHANGED

## 2021-08-10 ASSESSMENT — PAIN DESCRIPTION - FREQUENCY: FREQUENCY: INTERMITTENT

## 2021-08-10 ASSESSMENT — PAIN DESCRIPTION - PAIN TYPE: TYPE: CHRONIC PAIN

## 2021-08-10 ASSESSMENT — PAIN SCALES - GENERAL: PAINLEVEL_OUTOF10: 4

## 2021-08-10 ASSESSMENT — PAIN - FUNCTIONAL ASSESSMENT: PAIN_FUNCTIONAL_ASSESSMENT: PREVENTS OR INTERFERES SOME ACTIVE ACTIVITIES AND ADLS

## 2021-08-10 ASSESSMENT — PAIN DESCRIPTION - ORIENTATION: ORIENTATION: LOWER

## 2021-08-10 ASSESSMENT — PAIN DESCRIPTION - LOCATION: LOCATION: BACK

## 2021-08-10 ASSESSMENT — PAIN DESCRIPTION - ONSET: ONSET: ON-GOING

## 2021-08-10 ASSESSMENT — PAIN DESCRIPTION - DESCRIPTORS: DESCRIPTORS: SORE

## 2021-08-10 NOTE — PROGRESS NOTES
Nohemi Carlson  [address]  APHKJ:[phone]        Date: 8/10/2021  Patient: Aruna Han  : 1945  ACCT #: [de-identified]  Referring Practitioner: FRANK Ren CNP  Diagnosis: Lumbar spondylosis and R knee OA  Treatment Diagnosis: LBP with R knee pain and strength and ROM deficits. Visit Information:  PT Visit Information  Onset Date: 20  PT Insurance Information: Medicare/Medical [place]  Total # of Visits Approved:  (BMN)  Total # of Visits to Date: 6  Plan of Care/Certification Expiration Date: 21  No Show: 0  Canceled Appointment: 0  Progress Note Counter:  (PN 21)    Subjective: Pt states his is doing okay today. He rates pain 4/10 on VAS. He isn't bad considering he swept the floor before PT.   Comments: xray=R knee moderate to severe medial patellofemoral OA  HEP Compliance:  [] Good [x] Fair [] Poor [] Reports not doing due to:    Vital Signs  Patient Currently in Pain: Yes   Pain Screening  Patient Currently in Pain: Yes  Pain Assessment  Pain Assessment: 0-10  Pain Level: 4  Pain Type: Chronic pain  Pain Location: Back  Pain Orientation: Lower  Pain Descriptors: Sore  Pain Frequency: Intermittent  Pain Onset: On-going  Clinical Progression: Not changed  Functional Pain Assessment: Prevents or interferes some active activities and ADLs    OBJECTIVE:   Exercises  Exercise 2: ham stretch 5 x 20 secs  Exercise 3: LTR x 10 each, 5 secs  Exercise 4: SLR 2 x 5, 5 secs  Exercise 5: bridging x 10, 5 secs  Exercise 6: hip abduction x 15, 5 secs Blue T band  Exercise 7: hip adduction x 15, 5 sec  Exercise 8: T ball hamstring curl x 10, 5 secs       Strength: [] NT  [x] MMT completed:  Strength RLE  Comment: Hip flex 3/5, hip abd 4-/5, knee 4-/5, ankle 4/5  Strength LLE  Comment: Hip flex 3/5, hip abd 4-/5, knee 4-/5, ankle 4/5        ROM: [x] NT  [] ROM measurements:      Manual:   Manual therapy  Other: MMT x 3 mins    *Indicates exercise, modality, or manual techniques to be initiated when appropriate    Assessment: Body structures, Functions, Activity limitations: Decreased ROM, Decreased strength, Increased pain, Decreased posture, Decreased functional mobility   Assessment: Pt performing hip isometrics and quadriceps strengthening exercises for improving B LE strength. He continues to have difficulting performing bed mobility and transfering sit to stand. He requires continued skilled PT for increasing functional deficits and decreasing LBP. Treatment Diagnosis: LBP with R knee pain and strength and ROM deficits. Prognosis: Good       Goals:  Short term goals  Time Frame for Short term goals: 2 weeks  Short term goal 1: Decrease lumbar pain 50% to assist with improved functional gains. Long term goals  Time Frame for Long term goals : 4-6 weeks  Long term goal 1: Indep HEP for symptom management  Long term goal 2: Pt demo improved overall function by reporting greater than 80% per functional survey score  Long term goal 3: Improve B hip strength 4-/5 to 4/5 to allow patient to perfrom bed mobility with min to no assist.  Long term goal 4: Pain-free lumbar AROM to 50% WNL allowing an increase in ADL tolerance. Long term goal 5: Pain-free R knee AROM to 0-125 deg  WNL allowing an increase in ADL tolerance. Progress toward goals: Pt continued progression towards improving hip strength and achieving long term goal 3. POST-PAIN       Pain Rating (0-10 pain scale):   4/10   Location and pain description same as pre-treatment unless indicated. Action: [] NA   [x] Perform HEP  [x] Meds as prescribed  [] Modalities as prescribed   [] Call Physician     Frequency/Duration:  Plan  Times per week: 2  Plan weeks: 4-6  Current Treatment Recommendations: Strengthening, ROM, Home Exercise Program, Manual Therapy - Soft Tissue Mobilization, Modalities     Pt to continue current HEP.   See objective section for any therapeutic exercise changes, additions or modifications this date.     PT Individual Minutes  Time In: 2750  Time Out: 1348  Minutes: 43  Timed Code Treatment Minutes: 43 Minutes     Timed Activity Minutes Units   Ther Ex 40 3   Manual  3 0       Signature:  Electronically signed by Lux Mccann PTA on 8/10/21 at 1:11 PM EDT

## 2021-08-12 ENCOUNTER — HOSPITAL ENCOUNTER (OUTPATIENT)
Dept: PHYSICAL THERAPY | Age: 76
Setting detail: THERAPIES SERIES
Discharge: HOME | End: 2021-08-12
Payer: MEDICARE

## 2021-08-12 PROCEDURE — 97110 THERAPEUTIC EXERCISES: CPT

## 2021-08-12 ASSESSMENT — PAIN DESCRIPTION - DESCRIPTORS: DESCRIPTORS: SORE

## 2021-08-12 ASSESSMENT — PAIN DESCRIPTION - ONSET: ONSET: GRADUAL

## 2021-08-12 ASSESSMENT — PAIN DESCRIPTION - FREQUENCY: FREQUENCY: INTERMITTENT

## 2021-08-12 ASSESSMENT — PAIN SCALES - GENERAL: PAINLEVEL_OUTOF10: 4

## 2021-08-12 ASSESSMENT — PAIN DESCRIPTION - PAIN TYPE: TYPE: CHRONIC PAIN

## 2021-08-12 ASSESSMENT — PAIN DESCRIPTION - PROGRESSION: CLINICAL_PROGRESSION: NOT CHANGED

## 2021-08-12 ASSESSMENT — PAIN DESCRIPTION - ORIENTATION: ORIENTATION: LOWER

## 2021-08-12 ASSESSMENT — PAIN DESCRIPTION - LOCATION: LOCATION: BACK

## 2021-08-12 NOTE — PROGRESS NOTES
[address]  [address]  QKIZX:[phone]    [] Certification  [x] Recertification []  Plan of Care  [x] Progress Note [] Discharge      To:  FRANK Stokes - AMBROSIO      From:  Leonid Aguilar, PT  Patient: Jeniffer Bailey     : 1945  Diagnosis: Lumbar spondylosis and R knee OA     Date: 2021  Treatment Diagnosis: LBP with R knee pain and strength and ROM deficits. Plan of Care/Certification Expiration Date: 21  Progress Report Period from:  2021  to 2021    Total # of Visits to Date: 7   No Show: 0    Canceled Appointment: 0     OBJECTIVE:   Short Term Goals - Time Frame for Short term goals: 2 weeks    Goals Current/Discharge status  Met   Short term goal 1: Decrease lumbar pain 50% to assist with improved functional gains. Pain Location: Back    Pain Level: 4    Pain Descriptors: Sore     [] yes  [x] no     Long Term Goals - Time Frame for Long term goals : 4-6 weeks  Goals Current/ Discharge status Met   Long term goal 1: Indep HEP for symptom management Pt independent with initial HEP. Will update to current HEP next visit [x] partial  [] no   Long term goal 2: Pt demo improved overall function by reporting greater than 80% per functional survey score Exam: LEFS 30/80= 62.5% functional [] yes  [x] no   Long term goal 3: Improve B hip strength 4-/5 to 4/5 to allow patient to perfrom bed mobility with min to no assist. Strength RLE  Comment: Hip flex 3/5, hip abd 4-/5, knee 4-/5, ankle 4/5  Strength LLE  Comment: Hip flex 3/5, hip abd 4-/5, knee 4-/5, ankle 4/5          [] yes  [x] no   Long term goal 4: Pain-free lumbar AROM to 50% WNL allowing an increase in ADL tolerance. Spine  Lumbar: Flex <25%, Ext <25%, L/R SB 50%(no c/o pain with all movements)   [] yes  [x] no   Long term goal 5: Pain-free R knee AROM to 0-125 deg  WNL allowing an increase in ADL tolerance.  AROM RLE (degrees)  RLE General AROM: R knee 0-118 degrees flexion    [] yes  [x] no      Body structures, Functions, Activity limitations: Decreased ROM, Decreased strength, Increased pain, Decreased posture, Decreased functional mobility   Assessment: Slow progress toward all goals. Pt continues to progress slowly through LE strengthening exercises d/t fatigue. He is tolerating supine quadriceps, glute med strengthening exercises. Attempted to progress to closed chain exercises but reported R knee pain. Pt has potential to advance to closed chain exercises with slight modification. He requires continue skilled PT to improve functional strength and standing tolerance. Prognosis: Good  Discharge Recommendations: Continue to assess pending progress      PLAN: [x] Progress note/Re-certification    Frequency/Duration:  Plan  Times per week: 2  Plan weeks: 4-6  Current Treatment Recommendations: Strengthening, ROM, Home Exercise Program, Manual Therapy - Soft Tissue Mobilization, Modalities     Precautions:  Cardiac: pacemaker, MI x 2, stents                         Patient Status:[x] Continue/ Initiate plan of Care    [] Discharge PT. Recommend pt continue with HEP. [] Additional visits requested, Please re-certify for additional visits:          Signature: Electronically signed by Barbara Weaver PTA on 8/12/21 at 2:14 PM EDT  Electronically signed by Lyssa Dixon PT on 8/13/2021 at 1:11 PM      If you have any questions or concerns, please don't hesitate to call. Thank you for your referral.    I have reviewed this plan of care and certify a need for medically necessary rehabilitation services.     Physician Signature:__________________________________________________________  Date:  Please sign and return

## 2021-08-12 NOTE — PROGRESS NOTES
[facility] [address] [facility]  [address]  PNKSQ:[phone]        Date: 2021  Patient: Tanya Ortega  : 1945  ACCT #: [de-identified]  Referring Practitioner: FRANK Dutta CNP  Diagnosis: Lumbar spondylosis and R knee OA  Treatment Diagnosis: LBP with R knee pain and strength and ROM deficits. Visit Information:  PT Visit Information  Onset Date: 20  PT Insurance Information: Medicare/Medical [place]  Total # of Visits Approved:  (BMN)  Total # of Visits to Date: 7  Plan of Care/Certification Expiration Date: 21  No Show: 0  Canceled Appointment: 0  Progress Note Counter:  (PN 21)    Subjective: Pt states low back a little more aggravated. He has been doing more over the past couple of days. He is doing okay with PT.   Comments: xray=R knee moderate to severe medial patellofemoral OA  HEP Compliance:  [] Good [x] Fair [] Poor [] Reports not doing due to:    Vital Signs  Patient Currently in Pain: Yes   Pain Screening  Patient Currently in Pain: Yes  Pain Assessment  Pain Assessment: 0-10  Pain Level: 4  Pain Type: Chronic pain  Pain Location: Back  Pain Orientation: Lower  Pain Descriptors: Sore  Pain Frequency: Intermittent  Pain Onset: Gradual  Clinical Progression: Not changed    OBJECTIVE:   Exercises  Exercise 2: ham stretch 5 x 20 secs  Exercise 3: LTR x 10 each, 5 secs  Exercise 4: SLR 2 x 5, 5 secs  Exercise 5: bridging x 10, 5 secs  Exercise 6: hip abduction x 15, 5 secs Blue T band  Exercise 7: hip adduction x 15, 5 sec  Exercise 8: T ball hamstring curl x 10, 5 secs  Exercise 9: Knee ext(table) x 10 each, 5 secs, 3 # ankle wts  Exercise 20: Updated HEP in chart, give next visit       Strength: [] NT  [x] MMT completed:  Strength RLE  Comment: Hip flex 3/5, hip abd 4-/5, knee 4-/5, ankle 4/5  Strength LLE  Comment: Hip flex 3/5, hip abd 4-/5, knee 4-/5, ankle 4/5        ROM: [] NT  [x] ROM measurements:     AROM RLE (degrees)  RLE General AROM: R knee 0-118 degrees flexion         Spine  Lumbar: Flex <25%, Ext <25%, L/R SB 50%  Manual:   Manual therapy  Other: ROM check x 3 mins    *Indicates exercise, modality, or manual techniques to be initiated when appropriate    Assessment: Body structures, Functions, Activity limitations: Decreased ROM, Decreased strength, Increased pain, Decreased posture, Decreased functional mobility   Assessment: Pt continues to progress slowly through LE strengthening exercises d/t fatigue. He is tolerating supine quadriceps, glute med strengthening exercises. Attempted to progress to closed chain exercises but reported R knee pain. Pt has potential to advance to closed chain exercises with slight modification. He requires continue skilled PT to improve functional strength and standing tolerance. Treatment Diagnosis: LBP with R knee pain and strength and ROM deficits. Prognosis: Good       Goals:  Short term goals  Time Frame for Short term goals: 2 weeks  Short term goal 1: Decrease lumbar pain 50% to assist with improved functional gains. Long term goals  Time Frame for Long term goals : 4-6 weeks  Long term goal 1: Indep HEP for symptom management  Long term goal 2: Pt demo improved overall function by reporting greater than 80% per functional survey score  Long term goal 3: Improve B hip strength 4-/5 to 4/5 to allow patient to perfrom bed mobility with min to no assist.  Long term goal 4: Pain-free lumbar AROM to 50% WNL allowing an increase in ADL tolerance. Long term goal 5: Pain-free R knee AROM to 0-125 deg  WNL allowing an increase in ADL tolerance. Progress toward goals: Pt continued progression of hamstring stretches and lumbar ROM exercises for meeting long term goal 4, 5. POST-PAIN       Pain Rating (0-10 pain scale):   4/10   Location and pain description same as pre-treatment unless indicated.    Action: [] NA   [x] Perform HEP  [] Meds as prescribed  [] Modalities as prescribed   [] Call Physician     Frequency/Duration:  Plan  Times per week: 2  Plan weeks: 4-6  Current Treatment Recommendations: Strengthening, ROM, Home Exercise Program, Manual Therapy - Soft Tissue Mobilization, Modalities     Pt to continue current HEP. See objective section for any therapeutic exercise changes, additions or modifications this date.     PT Individual Minutes  Time In: 1300  Time Out: 1350  Minutes: 50  Timed Code Treatment Minutes: 42 Minutes     Timed Activity Minutes Units   Ther Ex 39 3   Manual  3 0       Signature:  Electronically signed by Sharonda Velasco PTA on 8/12/21 at 1:06 PM EDT

## 2021-08-19 ENCOUNTER — HOSPITAL ENCOUNTER (OUTPATIENT)
Dept: PHYSICAL THERAPY | Age: 76
Setting detail: THERAPIES SERIES
Discharge: HOME | End: 2021-08-19
Payer: MEDICARE

## 2021-08-19 PROCEDURE — 97110 THERAPEUTIC EXERCISES: CPT

## 2021-08-19 ASSESSMENT — PAIN DESCRIPTION - FREQUENCY: FREQUENCY: INTERMITTENT

## 2021-08-19 ASSESSMENT — PAIN - FUNCTIONAL ASSESSMENT: PAIN_FUNCTIONAL_ASSESSMENT: PREVENTS OR INTERFERES SOME ACTIVE ACTIVITIES AND ADLS

## 2021-08-19 ASSESSMENT — PAIN DESCRIPTION - ONSET: ONSET: GRADUAL

## 2021-08-19 ASSESSMENT — PAIN DESCRIPTION - PAIN TYPE: TYPE: CHRONIC PAIN

## 2021-08-19 ASSESSMENT — PAIN DESCRIPTION - PROGRESSION: CLINICAL_PROGRESSION: NOT CHANGED

## 2021-08-19 ASSESSMENT — PAIN SCALES - GENERAL: PAINLEVEL_OUTOF10: 4

## 2021-08-19 ASSESSMENT — PAIN DESCRIPTION - ORIENTATION: ORIENTATION: LOWER

## 2021-08-19 ASSESSMENT — PAIN DESCRIPTION - LOCATION: LOCATION: BACK

## 2021-08-24 ENCOUNTER — HOSPITAL ENCOUNTER (OUTPATIENT)
Dept: PHYSICAL THERAPY | Age: 76
Setting detail: THERAPIES SERIES
Discharge: HOME | End: 2021-08-24
Payer: MEDICARE

## 2021-08-24 PROCEDURE — 97110 THERAPEUTIC EXERCISES: CPT

## 2021-08-24 ASSESSMENT — PAIN DESCRIPTION - FREQUENCY: FREQUENCY: INTERMITTENT

## 2021-08-24 ASSESSMENT — PAIN DESCRIPTION - ONSET: ONSET: GRADUAL

## 2021-08-24 ASSESSMENT — PAIN - FUNCTIONAL ASSESSMENT: PAIN_FUNCTIONAL_ASSESSMENT: PREVENTS OR INTERFERES SOME ACTIVE ACTIVITIES AND ADLS

## 2021-08-24 ASSESSMENT — PAIN DESCRIPTION - PROGRESSION: CLINICAL_PROGRESSION: NOT CHANGED

## 2021-08-24 ASSESSMENT — PAIN DESCRIPTION - PAIN TYPE: TYPE: CHRONIC PAIN

## 2021-08-24 ASSESSMENT — PAIN SCALES - GENERAL: PAINLEVEL_OUTOF10: 3

## 2021-08-24 ASSESSMENT — PAIN DESCRIPTION - ORIENTATION: ORIENTATION: LOWER

## 2021-08-24 ASSESSMENT — PAIN DESCRIPTION - DESCRIPTORS: DESCRIPTORS: SORE

## 2021-08-24 ASSESSMENT — PAIN DESCRIPTION - LOCATION: LOCATION: BACK

## 2021-08-24 NOTE — PROGRESS NOTES
[facility]  [address]  AJCXI:[phone]        Date: 2021  Patient: May Marks  : 1945  ACCT #: [de-identified]  Referring Practitioner: FRANK Salazar CNP  Diagnosis: Lumbar spondylosis and R knee OA  Treatment Diagnosis: LBP with R knee pain and strength and ROM deficits. Visit Information:  PT Visit Information  Onset Date: 20  PT Insurance Information: Medicare/Medical [place]  Total # of Visits Approved:  (BMN)  Total # of Visits to Date: 9  Plan of Care/Certification Expiration Date: 21  No Show: 0  Canceled Appointment: 0  Progress Note Counter:     Subjective: Pt 20 mins late to appointment. He notes low back is not bad today. The pain gets worse the more he is on his feet such as standing as the sink to do dishes. Comments: xray=R knee moderate to severe medial patellofemoral OA  HEP Compliance:  [] Good [x] Fair [] Poor [] Reports not doing due to:    Vital Signs  Patient Currently in Pain: Yes   Pain Screening  Patient Currently in Pain: Yes  Pain Assessment  Pain Assessment: 0-10  Pain Level: 3  Pain Type: Chronic pain  Pain Location: Back  Pain Orientation: Lower  Pain Descriptors: Sore  Pain Frequency: Intermittent  Pain Onset: Gradual  Clinical Progression: Not changed  Functional Pain Assessment: Prevents or interferes some active activities and ADLs    OBJECTIVE:   Exercises  Exercise 2: ham stretch 5 x 20 secs  Exercise 3: LTR x 10 each, 5 secs  Exercise 4: SLR 2 x 5, 5 secs  Exercise 5: bridging x 10, 5 secs  Exercise 8: T ball hamstring curl x 10, 5 secs       Strength: [x] NT  [] MMT completed:        ROM: [x] NT  [] ROM measurements:        *Indicates exercise, modality, or manual techniques to be initiated when appropriate    Assessment:         Body structures, Functions, Activity limitations: Decreased ROM, Decreased strength, Increased pain, Decreased posture, Decreased functional mobility Assessment: Time contraint with PT this visit. He completed supine trunk ROM and abdominal stability exercises. continues to progress towards improving functional strength and tolerance to activity. Treatment Diagnosis: LBP with R knee pain and strength and ROM deficits. Prognosis: Good       Goals:  Short term goals  Time Frame for Short term goals: 2 weeks  Short term goal 1: Decrease lumbar pain 50% to assist with improved functional gains. Long term goals  Time Frame for Long term goals : 4-6 weeks  Long term goal 1: Indep HEP for symptom management  Long term goal 2: Pt demo improved overall function by reporting greater than 80% per functional survey score  Long term goal 3: Improve B hip strength 4-/5 to 4/5 to allow patient to perfrom bed mobility with min to no assist.  Long term goal 4: Pain-free lumbar AROM to 50% WNL allowing an increase in ADL tolerance. Long term goal 5: Pain-free R knee AROM to 0-125 deg  WNL allowing an increase in ADL tolerance. Progress toward goals: Pt goals are ongoing. POST-PAIN       Pain Rating (0-10 pain scale):   3/10   Location and pain description same as pre-treatment unless indicated. Action: [] NA   [x] Perform HEP  [x] Meds as prescribed  [] Modalities as prescribed   [] Call Physician     Frequency/Duration:  Plan  Times per week: 2  Plan weeks: 4-6  Current Treatment Recommendations: Strengthening, ROM, Home Exercise Program, Manual Therapy - Soft Tissue Mobilization, Modalities     Pt to continue current HEP. See objective section for any therapeutic exercise changes, additions or modifications this date.     PT Individual Minutes  Time In: 5  Time Out: 336  Minutes: 23  Timed Code Treatment Minutes: 23 Minutes     Timed Activity Minutes Units   Ther Ex 23 2     Signature:  Electronically signed by Amey Apgar, PTA on 21 at 1:28 PM EDT

## 2021-08-26 ENCOUNTER — OFFICE VISIT (OUTPATIENT)
Dept: PAIN MANAGEMENT | Age: 76
End: 2021-08-26
Payer: MEDICARE

## 2021-08-26 VITALS
BODY MASS INDEX: 39.25 KG/M2 | SYSTOLIC BLOOD PRESSURE: 110 MMHG | TEMPERATURE: 97.8 F | HEIGHT: 68 IN | HEART RATE: 60 BPM | WEIGHT: 259 LBS | DIASTOLIC BLOOD PRESSURE: 70 MMHG

## 2021-08-26 DIAGNOSIS — M17.11 PRIMARY OSTEOARTHRITIS OF RIGHT KNEE: ICD-10-CM

## 2021-08-26 DIAGNOSIS — G57.11 MERALGIA PARAESTHETICA, RIGHT: ICD-10-CM

## 2021-08-26 DIAGNOSIS — M47.816 SPONDYLOSIS OF LUMBAR REGION WITHOUT MYELOPATHY OR RADICULOPATHY: ICD-10-CM

## 2021-08-26 DIAGNOSIS — G89.4 CHRONIC PAIN SYNDROME: ICD-10-CM

## 2021-08-26 DIAGNOSIS — M17.11 ARTHRITIS OF KNEE, RIGHT: ICD-10-CM

## 2021-08-26 PROCEDURE — 1036F TOBACCO NON-USER: CPT | Performed by: NURSE PRACTITIONER

## 2021-08-26 PROCEDURE — G8427 DOCREV CUR MEDS BY ELIG CLIN: HCPCS | Performed by: NURSE PRACTITIONER

## 2021-08-26 PROCEDURE — G8417 CALC BMI ABV UP PARAM F/U: HCPCS | Performed by: NURSE PRACTITIONER

## 2021-08-26 PROCEDURE — 99214 OFFICE O/P EST MOD 30 MIN: CPT | Performed by: NURSE PRACTITIONER

## 2021-08-26 PROCEDURE — 4040F PNEUMOC VAC/ADMIN/RCVD: CPT | Performed by: NURSE PRACTITIONER

## 2021-08-26 PROCEDURE — 1123F ACP DISCUSS/DSCN MKR DOCD: CPT | Performed by: NURSE PRACTITIONER

## 2021-08-26 RX ORDER — OXYCODONE HYDROCHLORIDE AND ACETAMINOPHEN 5; 325 MG/1; MG/1
1 TABLET ORAL 2 TIMES DAILY PRN
Qty: 60 TABLET | Refills: 0 | Status: SHIPPED | OUTPATIENT
Start: 2021-08-30 | End: 2021-09-28 | Stop reason: SDUPTHER

## 2021-08-26 ASSESSMENT — ENCOUNTER SYMPTOMS
BACK PAIN: 1
BLOOD IN STOOL: 0
SHORTNESS OF BREATH: 0

## 2021-08-26 NOTE — PROGRESS NOTES
Frequency of Communication with Friends and Family:     Frequency of Social Gatherings with Friends and Family:     Attends Mu-ism Services:     Active Member of Clubs or Organizations:     Attends Club or Organization Meetings:     Marital Status:    Intimate Partner Violence:     Fear of Current or Ex-Partner:     Emotionally Abused:     Physically Abused:     Sexually Abused:        Current Outpatient Medications on File Prior to Visit   Medication Sig Dispense Refill    gabapentin (NEURONTIN) 300 MG capsule Take 1 capsule by mouth nightly for 90 days. 90 capsule 0    Clopidogrel Bisulfate (PLAVIX PO) Take by mouth      Handicap Placard MISC by Does not apply route Valid 9/1/19- 9/1/23 1 each 0    allopurinol (ZYLOPRIM) 100 MG tablet Take 200 mg by mouth daily      aspirin 81 MG tablet Take 81 mg by mouth daily      Omega-3 Fatty Acids (FISH OIL) 1000 MG CPDR Take 2 tablets by mouth 2 times daily      NONFORMULARY as needed Indications: METALAZONE 25MG  1 TAB PRN      omeprazole (PRILOSEC) 20 MG capsule Take 20 mg by mouth daily      ezetimibe-simvastatin (VYTORIN) 10-20 MG per tablet Take 1 tablet by mouth nightly      carvedilol (COREG) 25 MG tablet       ONE TOUCH ULTRA TEST strip   0    ONETOUCH DELICA LANCETS 40J MISC   0    metFORMIN (GLUCOPHAGE) 1000 MG tablet       montelukast (SINGULAIR) 10 MG tablet       NITROSTAT 0.4 MG SL tablet   0    potassium chloride SA (K-DUR;KLOR-CON M) 20 MEQ tablet       spironolactone (ALDACTONE) 25 MG tablet   0    torsemide (DEMADEX) 100 MG tablet        No current facility-administered medications on file prior to visit. Pt presents today for a f/u of chronic low back and right knee pain. Right knee injection Is helping. Started PT for knees and hips which is going well. Pt feels pain level and functioning improves with prescribed medications and is able to walk without cane. Pt feels that walking aggravates the pain.   Pt denies with arthritic deformity noted.  Crepitus with ROM.  Gait antalgic.  Uses cane. Assessment:      Diagnosis Orders   1. Arthritis of knee, right  oxyCODONE-acetaminophen (PERCOCET) 5-325 MG per tablet   2. Chronic pain syndrome  oxyCODONE-acetaminophen (PERCOCET) 5-325 MG per tablet   3. Primary osteoarthritis of right knee  oxyCODONE-acetaminophen (PERCOCET) 5-325 MG per tablet   4. Spondylosis of lumbar region without myelopathy or radiculopathy  oxyCODONE-acetaminophen (PERCOCET) 5-325 MG per tablet   5. Meralgia paraesthetica, right         Plan:     Periodic Controlled Substance Monitoring: Possible medication side effects, risk of tolerance/dependence & alternative treatments discussed., No signs of potential drug abuse or diversion identified. , Assessed functional status., Obtaining appropriate analgesic effect of treatment. (Andre Cuellar, APRN - CNP)    Orders Placed This Encounter   Medications    oxyCODONE-acetaminophen (PERCOCET) 5-325 MG per tablet     Sig: Take 1 tablet by mouth 2 times daily as needed for Pain for up to 30 days. Dispense:  60 tablet     Refill:  0     Reduce doses taken as pain becomes manageable       No orders of the defined types were placed in this encounter. Discussed options with the patient today. Continue Percocet and Gabapentin (has 90 day RX).   Will continue PT for lumbar and knee.  Will be seeing cardiology in September to see if pacemaker battery needs replaced.  All questions were answered.  Pt verbalized understanding and agrees with above plan. Utox reviewed and appropriate from 6/24/21. Narcan declined 4/29/21.     Will continue medications for chronic pain as they help pt function with ADL and improve quality of life.  Discussed possible risks of opiate medication with pt, including but not limited to, constipation, nausea or vomiting, sedation, urinary retention, dependence and possible addiction. Pt agrees to use medication as directed.  Pt advised to not use opiates while driving or operating heavy equipment, or in situations where pt may harm him/herself or others.  Pt is aware that while on narcotics, pt needs to be seen monthly to reassess pain and need for continued medication.  NDP reviewed. Neema Dill was reviewed.  This NP saw pt under direct supervision of Dr. Lindsay Vasquez    Follow up:  Return in about 4 weeks (around 9/23/2021) for review meds and reassess pain.     Poly Eduardo, APRN - CNP

## 2021-08-31 ENCOUNTER — HOSPITAL ENCOUNTER (OUTPATIENT)
Dept: PHYSICAL THERAPY | Age: 76
Setting detail: THERAPIES SERIES
Discharge: HOME | End: 2021-08-31
Payer: MEDICARE

## 2021-08-31 PROCEDURE — 97110 THERAPEUTIC EXERCISES: CPT

## 2021-08-31 ASSESSMENT — PAIN DESCRIPTION - ORIENTATION: ORIENTATION: LOWER

## 2021-08-31 ASSESSMENT — PAIN DESCRIPTION - PAIN TYPE: TYPE: CHRONIC PAIN

## 2021-08-31 ASSESSMENT — PAIN SCALES - GENERAL: PAINLEVEL_OUTOF10: 3

## 2021-08-31 ASSESSMENT — PAIN DESCRIPTION - LOCATION: LOCATION: BACK

## 2021-08-31 ASSESSMENT — PAIN - FUNCTIONAL ASSESSMENT: PAIN_FUNCTIONAL_ASSESSMENT: PREVENTS OR INTERFERES SOME ACTIVE ACTIVITIES AND ADLS

## 2021-08-31 ASSESSMENT — PAIN DESCRIPTION - PROGRESSION: CLINICAL_PROGRESSION: NOT CHANGED

## 2021-08-31 ASSESSMENT — PAIN DESCRIPTION - DESCRIPTORS: DESCRIPTORS: SORE;ACHING

## 2021-08-31 ASSESSMENT — PAIN DESCRIPTION - ONSET: ONSET: GRADUAL

## 2021-08-31 ASSESSMENT — PAIN DESCRIPTION - FREQUENCY: FREQUENCY: INTERMITTENT

## 2021-08-31 NOTE — PROGRESS NOTES
Mobilization, Modalities     Pt to continue current HEP. See objective section for any therapeutic exercise changes, additions or modifications this date.     PT Individual Minutes  Time In: 1300  Time Out: 0905  Minutes: 45  Timed Code Treatment Minutes: 40 Minutes     Timed Activity Minutes Units   Ther Ex 40 3     Signature:  Electronically signed by Shira Hicks PTA on 8/31/21 at 1:12 PM EDT

## 2021-09-02 ENCOUNTER — HOSPITAL ENCOUNTER (OUTPATIENT)
Dept: PHYSICAL THERAPY | Age: 76
Setting detail: THERAPIES SERIES
Discharge: HOME | End: 2021-09-02
Payer: MEDICARE

## 2021-09-02 PROCEDURE — 97110 THERAPEUTIC EXERCISES: CPT

## 2021-09-02 ASSESSMENT — PAIN DESCRIPTION - PROGRESSION: CLINICAL_PROGRESSION: NOT CHANGED

## 2021-09-02 ASSESSMENT — PAIN DESCRIPTION - FREQUENCY: FREQUENCY: INTERMITTENT

## 2021-09-02 ASSESSMENT — PAIN DESCRIPTION - ONSET: ONSET: GRADUAL

## 2021-09-02 ASSESSMENT — PAIN - FUNCTIONAL ASSESSMENT: PAIN_FUNCTIONAL_ASSESSMENT: PREVENTS OR INTERFERES WITH MANY ACTIVE NOT PASSIVE ACTIVITIES

## 2021-09-02 ASSESSMENT — PAIN SCALES - GENERAL: PAINLEVEL_OUTOF10: 3

## 2021-09-02 ASSESSMENT — PAIN DESCRIPTION - DESCRIPTORS: DESCRIPTORS: ACHING;SORE

## 2021-09-02 ASSESSMENT — PAIN DESCRIPTION - ORIENTATION: ORIENTATION: LOWER

## 2021-09-02 ASSESSMENT — PAIN DESCRIPTION - LOCATION: LOCATION: BACK

## 2021-09-02 ASSESSMENT — PAIN DESCRIPTION - PAIN TYPE: TYPE: CHRONIC PAIN

## 2021-09-02 NOTE — PROGRESS NOTES
[facility]  [address]  VBGST:[phone]        Date: 2021  Patient: Jeniffer Bailey  : 1945  ACCT #: [de-identified]  Referring Practitioner: FRANK Stokes CNP  Diagnosis: Lumbar spondylosis and R knee OA  Treatment Diagnosis: LBP with R knee pain and strength and ROM deficits. Visit Information:  PT Visit Information  Onset Date: 20  PT Insurance Information: Medicare/Medical [place]  Total # of Visits Approved:  (BMN)  Total # of Visits to Date: 6  Plan of Care/Certification Expiration Date: 21  No Show: 0  Progress Note Due Date: 21  Canceled Appointment: 0  Progress Note Counter:     Subjective: Pt states low back is all right(3/10 pain). C/o increased low back pain putting dishes in the . Just standing in one spot causes increased LBP. He is having more issues with R shoulder and it goes down his arm(8/10 pain). Comments: xray=R knee moderate to severe medial patellofemoral OA  HEP Compliance:  [] Good [x] Fair [] Poor [] Reports not doing due to:    Vital Signs  Patient Currently in Pain: Yes   Pain Screening  Patient Currently in Pain: Yes  Pain Assessment  Pain Assessment: 0-10  Pain Level: 3  Pain Type: Chronic pain  Pain Location: Back  Pain Orientation: Lower  Pain Descriptors: Aching; Sore  Pain Frequency: Intermittent  Pain Onset: Gradual  Clinical Progression: Not changed  Functional Pain Assessment: Prevents or interferes with many active not passive activities    OBJECTIVE:   Exercises  Exercise 1: See manual for passive hamstring stretch  Exercise 2: ham stretch (active)5 x 20 secs  Exercise 3: LTR x 10 each, 5 secs  Exercise 4: SLR 2 x 5, 5 secs  Exercise 5: bridging x 10, 5 secs  Exercise 6: s/l hip abduction x 5, 3 secs each  Exercise 8: T ball hamstring curl x 10, 5 secs  Exercise 11: Bike: (sport) x 2 mins(wall clock)       Strength: [] NT  [x] MMT completed:  Strength RLE  Comment: Hip flex 4-/5, hip abd(sidelying) 4-/5, knee flx 3+ to 4-/5, knee ext 4+/5  Strength LLE  Comment: Hip flex 3+/5, hip abd(sidelying) 3+to 4-/5, knee flx 4-/5, knee ext 4+/5        ROM: [] NT  [x] ROM measurements:     AROM RLE (degrees)  RLE General AROM: R knee -8 to 125 degrees flexion      Manual:   Manual therapy  PROM: hamstring stretch 3 x 20 secs ( x 2 mins)    *Indicates exercise, modality, or manual techniques to be initiated when appropriate    Assessment: Body structures, Functions, Activity limitations: Decreased ROM, Decreased strength, Increased pain, Decreased posture, Decreased functional mobility   Assessment: Pt continued progression of lumbar-LE ther ex. Performing LTR and hamstring stretches for increased trunk mobility. He progressed to side lying hip abduction to improve glute med strength. Improvement noted with B LE strength which is indicated through MMT. He is tolerating RB for increasing R knee AROM. Good candidate towards meeting long term goals per PT POC. Treatment Diagnosis: LBP with R knee pain and strength and ROM deficits. Prognosis: Good       Goals:  Short term goals  Time Frame for Short term goals: 2 weeks  Short term goal 1: Decrease lumbar pain 50% to assist with improved functional gains. Long term goals  Time Frame for Long term goals : 4-6 weeks  Long term goal 1: Indep HEP for symptom management  Long term goal 2: Pt demo improved overall function by reporting greater than 80% per functional survey score  Long term goal 3: Improve B hip strength 4-/5 to 4/5 to allow patient to perfrom bed mobility with min to no assist.  Long term goal 4: Pain-free lumbar AROM to 50% WNL allowing an increase in ADL tolerance. Long term goal 5: Pain-free R knee AROM to 0-125 deg  WNL allowing an increase in ADL tolerance. Progress toward goals: Pt performing B hamstring stretches for increasing knee AROM and achieving long term goal 5.      POST-PAIN       Pain Rating (0-10 pain scale):   3/10   Location and pain description same as pre-treatment unless indicated. Action: [] NA   [x] Perform HEP  [x] Meds as prescribed  [] Modalities as prescribed   [] Call Physician     Frequency/Duration:  Plan  Times per week: 2  Plan weeks: 4-6  Current Treatment Recommendations: Strengthening, ROM, Home Exercise Program, Manual Therapy - Soft Tissue Mobilization, Modalities     Pt to continue current HEP. See objective section for any therapeutic exercise changes, additions or modifications this date.     PT Individual Minutes  Time In: 1198  Time Out: 4138  Minutes: 42  Timed Code Treatment Minutes: 42 Minutes     Timed Activity Minutes Units   Ther Ex 40 3   Manual  2 0       Signature:  Electronically signed by Aniya Arredondo PTA on 9/2/21 at 1:16 PM EDT

## 2021-09-14 ENCOUNTER — APPOINTMENT (OUTPATIENT)
Dept: PHYSICAL THERAPY | Age: 76
End: 2021-09-14
Payer: MEDICARE

## 2021-09-16 ENCOUNTER — APPOINTMENT (OUTPATIENT)
Dept: PHYSICAL THERAPY | Age: 76
End: 2021-09-16
Payer: MEDICARE

## 2021-09-21 ENCOUNTER — APPOINTMENT (OUTPATIENT)
Dept: PHYSICAL THERAPY | Age: 76
End: 2021-09-21
Payer: MEDICARE

## 2021-09-23 ENCOUNTER — HOSPITAL ENCOUNTER (OUTPATIENT)
Dept: PHYSICAL THERAPY | Age: 76
Setting detail: THERAPIES SERIES
Discharge: HOME | End: 2021-09-23
Payer: MEDICARE

## 2021-09-23 PROCEDURE — 97140 MANUAL THERAPY 1/> REGIONS: CPT

## 2021-09-23 PROCEDURE — 97110 THERAPEUTIC EXERCISES: CPT

## 2021-09-23 ASSESSMENT — PAIN DESCRIPTION - FREQUENCY: FREQUENCY: INTERMITTENT

## 2021-09-23 ASSESSMENT — PAIN SCALES - GENERAL: PAINLEVEL_OUTOF10: 4

## 2021-09-23 ASSESSMENT — PAIN - FUNCTIONAL ASSESSMENT: PAIN_FUNCTIONAL_ASSESSMENT: PREVENTS OR INTERFERES WITH MANY ACTIVE NOT PASSIVE ACTIVITIES

## 2021-09-23 ASSESSMENT — PAIN DESCRIPTION - PROGRESSION: CLINICAL_PROGRESSION: NOT CHANGED

## 2021-09-23 ASSESSMENT — PAIN DESCRIPTION - ORIENTATION: ORIENTATION: LOWER

## 2021-09-23 ASSESSMENT — PAIN DESCRIPTION - LOCATION: LOCATION: BACK

## 2021-09-23 ASSESSMENT — PAIN DESCRIPTION - ONSET: ONSET: GRADUAL

## 2021-09-23 ASSESSMENT — PAIN DESCRIPTION - DESCRIPTORS: DESCRIPTORS: ACHING;SORE

## 2021-09-23 ASSESSMENT — PAIN DESCRIPTION - PAIN TYPE: TYPE: CHRONIC PAIN

## 2021-09-23 NOTE — PROGRESS NOTES
AROM RLE (degrees)  RLE General AROM: R knee -5 to 110 degrees flexion   Spine  Lumbar: Flex <25%, Ext <25%, L/R SB 50% (min c/o pain in all directions)  Manual:   Manual therapy  Other: ROM/MMT check x 5 mins    *Indicates exercise, modality, or manual techniques to be initiated when appropriate    Assessment: Body structures, Functions, Activity limitations: Decreased ROM, Decreased strength, Increased pain, Decreased posture, Decreased functional mobility   Assessment: Pt resumed lumbar, knee, LE ther ex this date. He has been unable to attend physical therapy d/t death in the family. This visit he completed gentle trunk stretches such as LTR and hamstring stretches to improve ROM. There continues to be functional deficits with knee AROM, trunk AROM, and LE strength. Continuation of skilled PT is necessary for improving mobility, strength, and function. Treatment Diagnosis: LBP with R knee pain and strength and ROM deficits. Prognosis: Good       Goals:  Short term goals  Time Frame for Short term goals: 2 weeks  Short term goal 1: Decrease lumbar pain 50% to assist with improved functional gains. Long term goals  Time Frame for Long term goals : 4-6 weeks  Long term goal 1: Indep HEP for symptom management  Long term goal 2: Pt demo improved overall function by reporting greater than 80% per functional survey score  Long term goal 3: Improve B hip strength 4-/5 to 4/5 to allow patient to perfrom bed mobility with min to no assist.  Long term goal 4: Pain-free lumbar AROM to 50% WNL allowing an increase in ADL tolerance. Long term goal 5: Pain-free R knee AROM to 0-125 deg  WNL allowing an increase in ADL tolerance. Progress toward goals: See progress note    POST-PAIN       Pain Rating (0-10 pain scale):   4/10   Location and pain description same as pre-treatment unless indicated.    Action: [] NA   [x] Perform HEP  [x] Meds as prescribed  [] Modalities as prescribed   [] Call Physician Frequency/Duration:  Plan  Times per week: 2  Plan weeks: 4-6  Current Treatment Recommendations: Strengthening, ROM, Home Exercise Program, Manual Therapy - Soft Tissue Mobilization, Modalities     Pt to continue current HEP. See objective section for any therapeutic exercise changes, additions or modifications this date.     PT Individual Minutes  Time In: 1320  Time Out: 1348  Minutes: 28  Timed Code Treatment Minutes: 25 Minutes     Timed Activity Minutes Units   Ther Ex 20 1   Manual  5 1       Signature:  Electronically signed by Marybeth Conteh PTA on 9/23/21 at 1:23 PM EDT

## 2021-09-23 NOTE — PROGRESS NOTES
[address]  [address]  XKTLE:[phone]    [] Certification  [x] Recertification []  Plan of Care  [x] Progress Note [] Discharge      To:  FRANK Rojo - AMBROSIO      From:  Shine Ledbetter PT  Patient: Ila Polk     : 1945  Diagnosis: Lumbar spondylosis and R knee OA     Date: 2021  Treatment Diagnosis: LBP with R knee pain and strength and ROM deficits. Plan of Care/Certification Expiration Date: 10/25/21  Progress Report Period from:  2021  to 2021    Total # of Visits to Date: 12   No Show: 0    Canceled Appointment: 0     OBJECTIVE:   Short Term Goals - Time Frame for Short term goals: 2 weeks    Goals Current/Discharge status  Met   Short term goal 1: Decrease lumbar pain 50% to assist with improved functional gains. Pain Location: Back    Pain Level: 4    Pain Descriptors: Aching, Sore     [] yes  [x] no     Long Term Goals - Time Frame for Long term goals : 4-6 weeks  Goals Current/ Discharge status Met   Long term goal 1: Indep HEP for symptom management Pt independent with issued HEP [x] yes  [] no   Long term goal 2: Pt demo improved overall function by reporting greater than 80% per functional survey score Exam: LEFS 42/80 = 53% functional [] yes  [x] no   Long term goal 3: Improve B hip strength 4-/5 to 4/5 to allow patient to perfrom bed mobility with min to no assist. Strength RLE  Comment: Hip flex 4-/5, hip abd(sidelying) 3+ to 4-/5, knee flx 3+ to 4-/5, knee ext 4+/5  Strength LLE  Comment: Hip flex 3+/5, hip abd(sidelying)4-/5, knee flx 4-/5, knee ext 4+/5     [] yes  [x] no   Long term goal 4: Pain-free lumbar AROM to 50% WNL allowing an increase in ADL tolerance. Spine  Lumbar: Flex <25%, Ext <25%, L/R SB 50% (min c/o pain in all directions)   [] yes  [x] no   Long term goal 5: Pain-free R knee AROM to 0-125 deg  WNL allowing an increase in ADL tolerance.  AROM RLE (degrees)  RLE General AROM: R knee -5 to 110 degrees flexion    [] yes  [x] no      Body structures, Functions, Activity limitations: Decreased ROM, Decreased strength, Increased pain, Decreased posture, Decreased functional mobility   Assessment: Pt resumed lumbar, knee, LE ther ex this date. He has been unable to attend physical therapy d/t death in the family. This visit he completed gentle trunk stretches such as LTR and hamstring stretches to improve ROM. There continues to be functional deficits with knee AROM, trunk AROM, and LE strength. Continuation of skilled PT is necessary for improving mobility, strength, and function. Prognosis: Good  Discharge Recommendations: Continue to assess pending progress    PLAN: [x] Progress note/Re-certification    Frequency/Duration:  Plan  Times per week: 2  Plan weeks: 4-6  Current Treatment Recommendations: Strengthening, ROM, Home Exercise Program, Manual Therapy - Soft Tissue Mobilization, Modalities                    Patient Status:[x] Continue/ Initiate plan of Care    [] Discharge PT. Recommend pt continue with HEP. [x] Additional visits requested, Please re-certify for additional visits:8          Signature: Electronically signed and objectives taken by Jayden Valdovinos PTA on 9/23/21 at 2:56 PM EDT  Electronically signed by Angela Esqueda PT on 9/24/2021 at 3:30 PM    If you have any questions or concerns, please don't hesitate to call. Thank you for your referral.    I have reviewed this plan of care and certify a need for medically necessary rehabilitation services.     Physician Signature:__________________________________________________________  Date:  Please sign and return

## 2021-09-28 ENCOUNTER — HOSPITAL ENCOUNTER (OUTPATIENT)
Dept: PHYSICAL THERAPY | Age: 76
Setting detail: THERAPIES SERIES
Discharge: HOME | End: 2021-09-28
Payer: MEDICARE

## 2021-09-28 ENCOUNTER — OFFICE VISIT (OUTPATIENT)
Dept: PAIN MANAGEMENT | Age: 76
End: 2021-09-28
Payer: MEDICARE

## 2021-09-28 VITALS
DIASTOLIC BLOOD PRESSURE: 86 MMHG | SYSTOLIC BLOOD PRESSURE: 130 MMHG | HEIGHT: 68 IN | WEIGHT: 260 LBS | BODY MASS INDEX: 39.4 KG/M2 | HEART RATE: 89 BPM | TEMPERATURE: 97.5 F

## 2021-09-28 DIAGNOSIS — G89.4 CHRONIC PAIN SYNDROME: ICD-10-CM

## 2021-09-28 DIAGNOSIS — M47.816 SPONDYLOSIS OF LUMBAR REGION WITHOUT MYELOPATHY OR RADICULOPATHY: ICD-10-CM

## 2021-09-28 DIAGNOSIS — M17.11 ARTHRITIS OF KNEE, RIGHT: ICD-10-CM

## 2021-09-28 DIAGNOSIS — G57.11 MERALGIA PARAESTHETICA, RIGHT: ICD-10-CM

## 2021-09-28 DIAGNOSIS — M17.11 PRIMARY OSTEOARTHRITIS OF RIGHT KNEE: ICD-10-CM

## 2021-09-28 PROCEDURE — 1036F TOBACCO NON-USER: CPT | Performed by: NURSE PRACTITIONER

## 2021-09-28 PROCEDURE — 97110 THERAPEUTIC EXERCISES: CPT

## 2021-09-28 PROCEDURE — 4040F PNEUMOC VAC/ADMIN/RCVD: CPT | Performed by: NURSE PRACTITIONER

## 2021-09-28 PROCEDURE — G8417 CALC BMI ABV UP PARAM F/U: HCPCS | Performed by: NURSE PRACTITIONER

## 2021-09-28 PROCEDURE — 99213 OFFICE O/P EST LOW 20 MIN: CPT | Performed by: NURSE PRACTITIONER

## 2021-09-28 PROCEDURE — 1123F ACP DISCUSS/DSCN MKR DOCD: CPT | Performed by: NURSE PRACTITIONER

## 2021-09-28 PROCEDURE — G8427 DOCREV CUR MEDS BY ELIG CLIN: HCPCS | Performed by: NURSE PRACTITIONER

## 2021-09-28 RX ORDER — GABAPENTIN 300 MG/1
300 CAPSULE ORAL NIGHTLY
Qty: 90 CAPSULE | Refills: 0 | Status: SHIPPED | OUTPATIENT
Start: 2021-09-28 | End: 2021-12-21 | Stop reason: SDUPTHER

## 2021-09-28 RX ORDER — OXYCODONE HYDROCHLORIDE AND ACETAMINOPHEN 5; 325 MG/1; MG/1
1 TABLET ORAL 2 TIMES DAILY PRN
Qty: 60 TABLET | Refills: 0 | Status: SHIPPED | OUTPATIENT
Start: 2021-09-29 | End: 2021-10-25 | Stop reason: SDUPTHER

## 2021-09-28 ASSESSMENT — ENCOUNTER SYMPTOMS
BLOOD IN STOOL: 0
SHORTNESS OF BREATH: 0

## 2021-09-28 ASSESSMENT — PAIN DESCRIPTION - LOCATION: LOCATION: BACK

## 2021-09-28 ASSESSMENT — PAIN DESCRIPTION - DESCRIPTORS: DESCRIPTORS: ACHING;SORE

## 2021-09-28 ASSESSMENT — PAIN DESCRIPTION - ONSET: ONSET: GRADUAL

## 2021-09-28 ASSESSMENT — PAIN DESCRIPTION - FREQUENCY: FREQUENCY: INTERMITTENT

## 2021-09-28 ASSESSMENT — PAIN - FUNCTIONAL ASSESSMENT: PAIN_FUNCTIONAL_ASSESSMENT: PREVENTS OR INTERFERES WITH MANY ACTIVE NOT PASSIVE ACTIVITIES

## 2021-09-28 ASSESSMENT — PAIN DESCRIPTION - ORIENTATION: ORIENTATION: LOWER

## 2021-09-28 ASSESSMENT — PAIN SCALES - GENERAL: PAINLEVEL_OUTOF10: 4

## 2021-09-28 ASSESSMENT — PAIN DESCRIPTION - PROGRESSION: CLINICAL_PROGRESSION: NOT CHANGED

## 2021-09-28 ASSESSMENT — PAIN DESCRIPTION - PAIN TYPE: TYPE: CHRONIC PAIN

## 2021-09-28 NOTE — PROGRESS NOTES
Shruthi Rehman  (1945)    2021    Subjective:     Shruthi Rehman is 68 y.o. male who complains today of:    Chief Complaint   Patient presents with    Knee Pain    Back Pain         Allergies:  Caffeine, Codeine, Penicillins, and Shellfish-derived products    Past Medical History:   Diagnosis Date    Chicken pox     Chronic back pain     Congenital heart disease     Mumps     Osteoarthritis     Rheumatic fever      Past Surgical History:   Procedure Laterality Date    CARDIAC SURGERY  03/10/2003, 2005    ALSO STENTS WERE PLACED. Family History   Problem Relation Age of Onset    High Blood Pressure Mother     Stroke Mother     High Blood Pressure Father      Social History     Socioeconomic History    Marital status:      Spouse name: Not on file    Number of children: Not on file    Years of education: Not on file    Highest education level: Not on file   Occupational History    Not on file   Tobacco Use    Smoking status: Former Smoker     Packs/day: 0.50     Years: 45.00     Pack years: 22.50     Types: Cigarettes     Quit date: 3/1/2005     Years since quittin.5    Smokeless tobacco: Never Used   Substance and Sexual Activity    Alcohol use: No     Alcohol/week: 0.0 standard drinks     Comment: RARELY    Drug use: Not on file    Sexual activity: Not on file   Other Topics Concern    Not on file   Social History Narrative    Not on file     Social Determinants of Health     Financial Resource Strain:     Difficulty of Paying Living Expenses:    Food Insecurity:     Worried About Running Out of Food in the Last Year:     Ran Out of Food in the Last Year:    Transportation Needs:     Lack of Transportation (Medical):      Lack of Transportation (Non-Medical):    Physical Activity:     Days of Exercise per Week:     Minutes of Exercise per Session:    Stress:     Feeling of Stress :    Social Connections:     Frequency of Communication with Friends and Family:     Frequency of Social Gatherings with Friends and Family:     Attends Gnosticist Services:     Active Member of Clubs or Organizations:     Attends Club or Organization Meetings:     Marital Status:    Intimate Partner Violence:     Fear of Current or Ex-Partner:     Emotionally Abused:     Physically Abused:     Sexually Abused:        Current Outpatient Medications on File Prior to Visit   Medication Sig Dispense Refill    Clopidogrel Bisulfate (PLAVIX PO) Take by mouth      Handicap Placard MISC by Does not apply route Valid 9/1/19- 9/1/23 1 each 0    allopurinol (ZYLOPRIM) 100 MG tablet Take 200 mg by mouth daily      aspirin 81 MG tablet Take 81 mg by mouth daily      Omega-3 Fatty Acids (FISH OIL) 1000 MG CPDR Take 2 tablets by mouth 2 times daily      NONFORMULARY as needed Indications: METALAZONE 25MG  1 TAB PRN      omeprazole (PRILOSEC) 20 MG capsule Take 20 mg by mouth daily      ezetimibe-simvastatin (VYTORIN) 10-20 MG per tablet Take 1 tablet by mouth nightly      carvedilol (COREG) 25 MG tablet       ONE TOUCH ULTRA TEST strip   0    ONETOUCH DELICA LANCETS 33O MISC   0    metFORMIN (GLUCOPHAGE) 1000 MG tablet       montelukast (SINGULAIR) 10 MG tablet       NITROSTAT 0.4 MG SL tablet   0    potassium chloride SA (K-DUR;KLOR-CON M) 20 MEQ tablet       spironolactone (ALDACTONE) 25 MG tablet   0    torsemide (DEMADEX) 100 MG tablet        No current facility-administered medications on file prior to visit. Pt presents today for a f/u of chronic low back and right knee pain. Having pacemaker/ICD replaced on Thursday. Continues PT 2x/week for knees and hips which is going well. Pt feels pain level and functioning improves with prescribed medications and is able to walk without cane. Pt feels that walking aggravates the pain. Pt denies radiating numbness and tingling. Gel injections with Dr. John Estrada in past.  He says he did see ortho.  Cannot have MRI. .  He says has knee brace and cane on occasion. had cardiac valve repair in July 2020. He has tried Rt SI joint and facet joint injections in the past with relief at that time. 8/9/21 R knee inj  4/5/21 BL gel one knees              Review of Systems   Constitutional: Negative for appetite change and fever. HENT: Negative for hearing loss. Eyes: Negative for visual disturbance. Respiratory: Negative for shortness of breath. Gastrointestinal: Negative for blood in stool. Genitourinary: Negative for difficulty urinating and hematuria. Musculoskeletal: Positive for arthralgias and joint swelling. Skin: Negative for rash. Neurological: Negative for facial asymmetry. Hematological: Negative for adenopathy. Psychiatric/Behavioral: Negative for self-injury. All other systems reviewed and are negative. Objective:     Vitals:  /86   Pulse 89   Temp 97.5 °F (36.4 °C)   Ht 5' 8\" (1.727 m)   Wt 260 lb (117.9 kg)   BMI 39.53 kg/m² Pain Score:   6      Physical Exam  Vitals and nursing note reviewed.          Pt is alert and oriented x 3.  Recent and remote memory is intact.  Mood, judgement and affect are normal.  No signs of distress or SOB noted.  Visualized skin intact.  Sensation intact to light touch. Decreased ROM with flexion and extension of low back.  Tender with palpation to bilateral lumbar spine with positive provacative maneuvers noted.  Negative SLR.   Pt is unable to briefly heel walk and toe walk.   Strength, balance, and coordination are diminished but functional for ambulation.  Left knee with decreased ROM.  Tenderness noted with palpation. Mild swelling noted with arthritic deformity noted.  Crepitus with ROM.  Gait antalgic.  Uses cane. Assessment:      Diagnosis Orders   1. Arthritis of knee, right  oxyCODONE-acetaminophen (PERCOCET) 5-325 MG per tablet   2. Chronic pain syndrome  oxyCODONE-acetaminophen (PERCOCET) 5-325 MG per tablet   3.  Primary osteoarthritis of right knee  oxyCODONE-acetaminophen (PERCOCET) 5-325 MG per tablet   4. Spondylosis of lumbar region without myelopathy or radiculopathy  oxyCODONE-acetaminophen (PERCOCET) 5-325 MG per tablet   5. Meralgia paraesthetica, right  gabapentin (NEURONTIN) 300 MG capsule       Plan:     Periodic Controlled Substance Monitoring: Possible medication side effects, risk of tolerance/dependence & alternative treatments discussed., No signs of potential drug abuse or diversion identified. , Assessed functional status., Obtaining appropriate analgesic effect of treatment. (Andre Cuellar, APRN - CNP)    Orders Placed This Encounter   Medications    oxyCODONE-acetaminophen (PERCOCET) 5-325 MG per tablet     Sig: Take 1 tablet by mouth 2 times daily as needed for Pain for up to 30 days. Dispense:  60 tablet     Refill:  0     Reduce doses taken as pain becomes manageable    gabapentin (NEURONTIN) 300 MG capsule     Sig: Take 1 capsule by mouth nightly for 90 days. Dispense:  90 capsule     Refill:  0       No orders of the defined types were placed in this encounter. Discussed options with the patient today. Continue Percocet and Gabapentin (90 day RX).   Will continue PT for lumbar and knee.  Pacemaker being replaced this week.  All questions were answered.  Pt verbalized understanding and agrees with above plan. Utox reviewed and appropriate from 6/24/21. Narcan declined 4/29/21.     Will continue medications for chronic pain as they help pt function with ADL and improve quality of life.  Discussed possible risks of opiate medication with pt, including but not limited to, constipation, nausea or vomiting, sedation, urinary retention, dependence and possible addiction. Pt agrees to use medication as directed.  Pt advised to not use opiates while driving or operating heavy equipment, or in situations where pt may harm him/herself or others.  Pt is aware that while on narcotics, pt needs to be seen monthly to reassess pain and need for continued medication.  NDP reviewed. Mariano Mensah was reviewed.  This NP saw pt under direct supervision of Dr. Ana Maria Paiz    Follow up:  Return in about 4 weeks (around 10/26/2021) for review meds and reassess pain.     Mary Albrecht, FRANK - CNP

## 2021-09-28 NOTE — PROGRESS NOTES
[address]  [address]:[phone]        Date: 2021  Patient: Elie Lucero  : 1945  ACCT #: [de-identified]  Referring Practitioner: FRANK Dutta CNP  Diagnosis: Lumbar spondylosis and R knee OA  Treatment Diagnosis: LBP with R knee pain and strength and ROM deficits. Visit Information:  PT Visit Information  Onset Date: 20  PT Insurance Information: Medicare/Medical [place]  Total # of Visits Approved:  (BMN)  Total # of Visits to Date: 15  Plan of Care/Certification Expiration Date: 10/25/21  No Show: 0  Canceled Appointment: 0  Progress Note Counter:  (next PN due 10/25/21)    Subjective: Pt notes low back is doing all right. He hasn't really done much today. If he stands too long low back pain will get worse(8/10 pain) once he sits down it will go away. He will not be able to attend PT the rest of this week d/t getting defibulator replaced on 2021. Comments: xray=R knee moderate to severe medial patellofemoral OA  HEP Compliance:  [] Good [x] Fair [] Poor [] Reports not doing due to:    Vital Signs  Patient Currently in Pain: Yes   Pain Screening  Patient Currently in Pain: Yes  Pain Assessment  Pain Level: 4  Pain Type: Chronic pain  Pain Location: Back  Pain Orientation: Lower  Pain Descriptors: Aching; Sore  Pain Frequency: Intermittent  Pain Onset: Gradual  Clinical Progression: Not changed  Functional Pain Assessment: Prevents or interferes with many active not passive activities    OBJECTIVE:   Exercises  Exercise 2: ham stretch (active)5 x 20 secs  Exercise 3: LTR x 10 each, 5 secs  Exercise 4: SLR 2 x 5, 5 secs  Exercise 5: bridging x 10, 5 secs  Exercise 8: T ball hamstring curl x 10, 5 secs  Exercise 9: Knee ext(table) x 10 each, 5 secs, 5 # ankle wts  Exercise 11: Bike: (sport) x 2 mins(wall clock)       Strength: [x] NT  [] MMT completed:     ROM: [x] NT  [] ROM measurements:     *Indicates exercise, modality, or manual techniques to be initiated when appropriate    Assessment: Body structures, Functions, Activity limitations: Decreased ROM, Decreased strength, Increased pain, Decreased posture, Decreased functional mobility   Assessment: Pt continued with lumbar stretches and B LE strengthening exercises. He is performing SLR, bridging, and LAQ's for improving knee stability. He resumed RB for increasing knee ROM. No issues noted after completing PT. Treatment Diagnosis: LBP with R knee pain and strength and ROM deficits. Prognosis: Good       Goals:  Short term goals  Time Frame for Short term goals: 2 weeks  Short term goal 1: Decrease lumbar pain 50% to assist with improved functional gains. Long term goals  Time Frame for Long term goals : 4-6 weeks  Long term goal 1: Indep HEP for symptom management  Long term goal 2: Pt demo improved overall function by reporting greater than 80% per functional survey score  Long term goal 3: Improve B hip strength 4-/5 to 4/5 to allow patient to perfrom bed mobility with min to no assist.  Long term goal 4: Pain-free lumbar AROM to 50% WNL allowing an increase in ADL tolerance. Long term goal 5: Pain-free R knee AROM to 0-125 deg  WNL allowing an increase in ADL tolerance. Progress toward goals: Pt performed LAQ's, SLR, and bridging exercises for increased LE strength and achieving long term goal 3. POST-PAIN       Pain Rating (0-10 pain scale):   4/10   Location and pain description same as pre-treatment unless indicated. Action: [] NA   [x] Perform HEP  [x] Meds as prescribed  [] Modalities as prescribed   [] Call Physician     Frequency/Duration:  Plan  Times per week: 2  Plan weeks: 4-6  Current Treatment Recommendations: Strengthening, ROM, Home Exercise Program, Manual Therapy - Soft Tissue Mobilization, Modalities     Pt to continue current HEP.   See objective section for any therapeutic exercise changes, additions or modifications this

## 2021-10-12 DIAGNOSIS — M47.816 SPONDYLOSIS OF LUMBAR REGION WITHOUT MYELOPATHY OR RADICULOPATHY: ICD-10-CM

## 2021-10-12 DIAGNOSIS — M17.11 ARTHRITIS OF KNEE, RIGHT: ICD-10-CM

## 2021-10-12 DIAGNOSIS — G89.4 CHRONIC PAIN SYNDROME: ICD-10-CM

## 2021-10-12 DIAGNOSIS — M17.11 PRIMARY OSTEOARTHRITIS OF RIGHT KNEE: ICD-10-CM

## 2021-10-12 NOTE — TELEPHONE ENCOUNTER
Patient was rescheduled from Thursday, 10/28/21 to Tuesday, 11/23/21. Patient needs a refill for:    Oxycodone - acetaminophen 5-325 MG per tablet - ends 10/29/21. I pended the medication and routed the request to [facility].

## 2021-10-25 RX ORDER — OXYCODONE HYDROCHLORIDE AND ACETAMINOPHEN 5; 325 MG/1; MG/1
1 TABLET ORAL 2 TIMES DAILY PRN
Qty: 60 TABLET | Refills: 0 | Status: SHIPPED | OUTPATIENT
Start: 2021-10-29 | End: 2021-11-23 | Stop reason: SDUPTHER

## 2021-10-25 NOTE — TELEPHONE ENCOUNTER
Requested Prescriptions     Pending Prescriptions Disp Refills    oxyCODONE-acetaminophen (PERCOCET) 5-325 MG per tablet 60 tablet 0     Sig: Take 1 tablet by mouth 2 times daily as needed for Pain for up to 30 days. Patient last seen on:  9/28  Date of last surgery:  n/a  Date of last refill: 9/29    Pain level:  n/a  Patient complaining of:  Pt rs'ed per office and asking for refill.    Future appts: 11/23

## 2021-10-26 NOTE — PROGRESS NOTES
[address]  [address]  [phone]     []? Certification  []? Recertification      []? Plan of Care  []? Progress Note [x]? Discharge                            To:  FRANK Arango - CNP        From:  Kyree Tucker, PT  Patient: Clementina Bettencourt     : 1945  Diagnosis: Lumbar spondylosis and R knee OA     Date: 10/26/2021  Treatment Diagnosis: LBP with R knee pain and strength and ROM deficits.     Plan of Care/Certification Expiration Date: 10/25/21  Progress Report Period from:  2021  to 2021     Total # of Visits to Date: 12   No Show: 0    Canceled Appointment: 0      OBJECTIVE:   Short Term Goals - Time Frame for Short term goals: 2 weeks    Goals Current/Discharge status  Met   Short term goal 1: Decrease lumbar pain 50% to assist with improved functional gains. Pain Location: Back    Pain Level: 4    Pain Descriptors: Aching, Sore       []? yes  [x]? no      Long Term Goals - Time Frame for Long term goals : 4-6 weeks  Goals Current/ Discharge status Met   Long term goal 1: Indep HEP for symptom management Pt independent with issued HEP [x]? yes  []? no   Long term goal 2: Pt demo improved overall function by reporting greater than 80% per functional survey score Exam: LEFS 42/80 = 53% functional []? yes  [x]? no   Long term goal 3: Improve B hip strength 4-/5 to 4/5 to allow patient to perfrom bed mobility with min to no assist. Strength RLE  Comment: Hip flex 4-/5, hip abd(sidelying) 3+ to 4-/5, knee flx 3+ to 4-/5, knee ext 4+/5  Strength LLE  Comment: Hip flex 3+/5, hip abd(sidelying)4-/5, knee flx 4-/5, knee ext 4+/5   []? yes  [x]? no   Long term goal 4: Pain-free lumbar AROM to 50% WNL allowing an increase in ADL tolerance. Spine  Lumbar: Flex <25%, Ext <25%, L/R SB 50% (min c/o pain in all directions)    []?  yes  [x]? no   Long term goal 5: Pain-free R knee AROM to 0-125 deg  WNL allowing an increase in ADL tolerance. AROM RLE (degrees)  RLE General AROM: R knee -5 to 110 degrees flexion   []? yes  [x]? no       Pt getting defibrillator replaced and unable to continue physical therapy at this time. He will return to physical therapy when deemed appropriate by cardiologist.        PLAN: [x]? D/C                                       Patient Status:[]? Continue/ Initiate plan of Care                          [x]? Discharge PT. Recommend pt continue with HEP.                            []? Additional visits requested, Please re-certify for additional visits:                                                       Signature: Electronically signed by Catherine Ann PTA on 10/26/2021 at 3:06 PM   Electronically signed by Kristie Ludwig PT on 10/26/2021 at 4:21 PM

## 2021-11-23 ENCOUNTER — OFFICE VISIT (OUTPATIENT)
Dept: PAIN MANAGEMENT | Age: 76
End: 2021-11-23
Payer: MEDICARE

## 2021-11-23 VITALS — HEIGHT: 68 IN | TEMPERATURE: 97.4 F | BODY MASS INDEX: 39.4 KG/M2 | WEIGHT: 260 LBS

## 2021-11-23 DIAGNOSIS — G89.4 CHRONIC PAIN SYNDROME: ICD-10-CM

## 2021-11-23 DIAGNOSIS — M17.11 ARTHRITIS OF KNEE, RIGHT: ICD-10-CM

## 2021-11-23 DIAGNOSIS — M47.816 SPONDYLOSIS OF LUMBAR REGION WITHOUT MYELOPATHY OR RADICULOPATHY: Primary | ICD-10-CM

## 2021-11-23 DIAGNOSIS — M17.11 PRIMARY OSTEOARTHRITIS OF RIGHT KNEE: ICD-10-CM

## 2021-11-23 PROCEDURE — 4040F PNEUMOC VAC/ADMIN/RCVD: CPT | Performed by: NURSE PRACTITIONER

## 2021-11-23 PROCEDURE — G8427 DOCREV CUR MEDS BY ELIG CLIN: HCPCS | Performed by: NURSE PRACTITIONER

## 2021-11-23 PROCEDURE — 1123F ACP DISCUSS/DSCN MKR DOCD: CPT | Performed by: NURSE PRACTITIONER

## 2021-11-23 PROCEDURE — 1036F TOBACCO NON-USER: CPT | Performed by: NURSE PRACTITIONER

## 2021-11-23 PROCEDURE — G8417 CALC BMI ABV UP PARAM F/U: HCPCS | Performed by: NURSE PRACTITIONER

## 2021-11-23 PROCEDURE — G8484 FLU IMMUNIZE NO ADMIN: HCPCS | Performed by: NURSE PRACTITIONER

## 2021-11-23 PROCEDURE — 99214 OFFICE O/P EST MOD 30 MIN: CPT | Performed by: NURSE PRACTITIONER

## 2021-11-23 RX ORDER — OXYCODONE HYDROCHLORIDE AND ACETAMINOPHEN 5; 325 MG/1; MG/1
1 TABLET ORAL 2 TIMES DAILY PRN
Qty: 60 TABLET | Refills: 0 | Status: SHIPPED | OUTPATIENT
Start: 2021-11-28 | End: 2021-12-21 | Stop reason: SDUPTHER

## 2021-11-23 ASSESSMENT — ENCOUNTER SYMPTOMS
SHORTNESS OF BREATH: 0
BLOOD IN STOOL: 0

## 2021-11-23 NOTE — PROGRESS NOTES
Shavonne Wood  (1945)    2021    Subjective:     Shavonne Wood is 68 y.o. male who complains today of:    Chief Complaint   Patient presents with    Back Pain    Shoulder Pain     right    Knee Pain     both knees         Allergies:  Caffeine, Codeine, Penicillins, and Shellfish-derived products    Past Medical History:   Diagnosis Date    Chicken pox     Chronic back pain     Congenital heart disease     Mumps     Osteoarthritis     Rheumatic fever      Past Surgical History:   Procedure Laterality Date    CARDIAC SURGERY  03/10/2003, 2005    ALSO STENTS WERE PLACED. Family History   Problem Relation Age of Onset    High Blood Pressure Mother     Stroke Mother     High Blood Pressure Father      Social History     Socioeconomic History    Marital status:      Spouse name: Not on file    Number of children: Not on file    Years of education: Not on file    Highest education level: Not on file   Occupational History    Not on file   Tobacco Use    Smoking status: Former Smoker     Packs/day: 0.50     Years: 45.00     Pack years: 22.50     Types: Cigarettes     Quit date: 3/1/2005     Years since quittin.7    Smokeless tobacco: Never Used   Substance and Sexual Activity    Alcohol use: No     Alcohol/week: 0.0 standard drinks     Comment: RARELY    Drug use: Not on file    Sexual activity: Not on file   Other Topics Concern    Not on file   Social History Narrative    Not on file     Social Determinants of Health     Financial Resource Strain:     Difficulty of Paying Living Expenses: Not on file   Food Insecurity:     Worried About [address] in the Last Year: Not on file    Chery of Food in the Last Year: Not on file   Transportation Needs:     Lack of Transportation (Medical): Not on file    Lack of Transportation (Non-Medical):  Not on file   Physical Activity:     Days of Exercise per Week: Not on file    Minutes of Exercise per Session: Not on file   Stress:     Feeling of Stress : Not on file   Social Connections:     Frequency of Communication with Friends and Family: Not on file    Frequency of Social Gatherings with Friends and Family: Not on file    Attends Anabaptist Services: Not on file    Active Member of [address] [place] [address] or Organizations: Not on file    Attends Club or Organization Meetings: Not on file    Marital Status: Not on file   Intimate Partner Violence:     Fear of Current or Ex-Partner: Not on file    Emotionally Abused: Not on file    Physically Abused: Not on file    Sexually Abused: Not on file   Housing Stability:     Unable to Pay for Housing in the Last Year: Not on file    Number of Marcos in the Last Year: Not on file    Unstable Housing in the Last Year: Not on file       Current Outpatient Medications on File Prior to Visit   Medication Sig Dispense Refill    gabapentin (NEURONTIN) 300 MG capsule Take 1 capsule by mouth nightly for 90 days.  90 capsule 0    Clopidogrel Bisulfate (PLAVIX PO) Take by mouth      Handicap Placard MISC by Does not apply route Valid 9/1/19- 9/1/23 1 each 0    allopurinol (ZYLOPRIM) 100 MG tablet Take 200 mg by mouth daily      aspirin 81 MG tablet Take 81 mg by mouth daily      Omega-3 Fatty Acids (FISH OIL) 1000 MG CPDR Take 2 tablets by mouth 2 times daily      NONFORMULARY as needed Indications: METALAZONE 25MG  1 TAB PRN      omeprazole (PRILOSEC) 20 MG capsule Take 20 mg by mouth daily      ezetimibe-simvastatin (VYTORIN) 10-20 MG per tablet Take 1 tablet by mouth nightly      carvedilol (COREG) 25 MG tablet       ONE TOUCH ULTRA TEST strip   0    ONETOUCH DELICA LANCETS 05D MISC   0    metFORMIN (GLUCOPHAGE) 1000 MG tablet       montelukast (SINGULAIR) 10 MG tablet       NITROSTAT 0.4 MG SL tablet   0    potassium chloride SA (K-DUR;KLOR-CON M) 20 MEQ tablet       spironolactone (ALDACTONE) 25 MG tablet   0    torsemide (DEMADEX) 100 MG tablet No current facility-administered medications on file prior to visit. Pt presents today for a f/u of chronic low back and right knee pain. Having pacemaker/ICD replaced last month which went well. Continues PT 2x/week for knees and hips which is going well. Pt feels pain level and functioning improves with prescribed medications and is able to walk without cane. Pt feels that walking & cold weather aggravates the pain. Pt denies radiating numbness and tingling. Gel injections with Dr. Prince Obregon in past.  He says he did see ortho. Cannot have MRI. He says has knee brace and cane on occasion. had cardiac valve repair in July 2020. He has tried Rt SI joint and facet joint injections in the past with relief at that time. 8/9/21 R knee inj  4/5/21 BL gel one knees        Review of Systems   Constitutional: Negative for appetite change and fever. HENT: Negative for hearing loss. Eyes: Negative for visual disturbance. Respiratory: Negative for shortness of breath. Gastrointestinal: Negative for blood in stool. Genitourinary: Negative for difficulty urinating and hematuria. Musculoskeletal: Positive for arthralgias. Skin: Negative for rash. Neurological: Negative for facial asymmetry. Hematological: Negative for adenopathy. Psychiatric/Behavioral: Negative for self-injury. All other systems reviewed and are negative. Objective:     Vitals:  Temp 97.4 °F (36.3 °C)   Ht 5' 8\" (1.727 m)   Wt 260 lb (117.9 kg)   BMI 39.53 kg/m² Pain Score:   9      Physical Exam  Vitals and nursing note reviewed.        Pt is alert and oriented x 3.  Recent and remote memory is intact.  Mood, judgement and affect are normal.  No signs of distress or SOB noted.  Visualized skin intact.  Sensation intact to light touch. Decreased ROM with flexion and extension of low back.  Tender with palpation to bilateral lumbar spine with positive provacative maneuvers noted.  Negative SLR.    Pt is unable to briefly heel walk and toe walk.   Strength, balance, and coordination are diminished but functional for ambulation.  Left knee with decreased ROM.  Tenderness noted with palpation. Mild swelling noted with arthritic deformity noted.  Crepitus with ROM.  Gait antalgic.  Uses cane. Assessment:      Diagnosis Orders   1. Spondylosis of lumbar region without myelopathy or radiculopathy  oxyCODONE-acetaminophen (PERCOCET) 5-325 MG per tablet    Ambulatory referral to Physical Therapy   2. Arthritis of knee, right  oxyCODONE-acetaminophen (PERCOCET) 5-325 MG per tablet    Ambulatory referral to Physical Therapy   3. Chronic pain syndrome  oxyCODONE-acetaminophen (PERCOCET) 5-325 MG per tablet   4. Primary osteoarthritis of right knee  oxyCODONE-acetaminophen (PERCOCET) 5-325 MG per tablet       Plan:     Periodic Controlled Substance Monitoring: Possible medication side effects, risk of tolerance/dependence & alternative treatments discussed., No signs of potential drug abuse or diversion identified. , Assessed functional status., Obtaining appropriate analgesic effect of treatment. (Jenna Barrett, APRN - CNP)    Orders Placed This Encounter   Medications    oxyCODONE-acetaminophen (PERCOCET) 5-325 MG per tablet     Sig: Take 1 tablet by mouth 2 times daily as needed for Pain for up to 30 days. Dispense:  60 tablet     Refill:  0     Reduce doses taken as pain becomes manageable       Orders Placed This Encounter   Procedures    Ambulatory referral to Physical Therapy     Referral Priority:   Routine     Referral Type:   Eval and Treat     Referral Reason:   Specialty Services Required     Requested Specialty:   Physical Therapy     Number of Visits Requested:   1     Discussed options with the patient today. Continue Percocet and Gabapentin (90 day RX).   He would like to continue PT after the holidays.  New order placed today. All questions were answered.  Pt verbalized understanding and agrees with above plan. Utox reviewed and appropriate from 6/24/21. Narcan declined 4/29/21.     Will continue medications for chronic pain as they help pt function with ADL and improve quality of life.  Discussed possible risks of opiate medication with pt, including but not limited to, constipation, nausea or vomiting, sedation, urinary retention, dependence and possible addiction. Pt agrees to use medication as directed. Pt advised to not use opiates while driving or operating heavy equipment, or in situations where pt may harm him/herself or others.  Pt is aware that while on narcotics, pt needs to be seen monthly to reassess pain and need for continued medication.  NDP reviewed. Monique Mac was reviewed.  This NP saw pt under direct supervision of Dr. Hans Velez    Follow up:  Return in about 4 weeks (around 12/21/2021) for review meds and reassess pain.     Mark Sarmiento, FRANK - CNP

## 2021-12-21 ENCOUNTER — OFFICE VISIT (OUTPATIENT)
Dept: PAIN MANAGEMENT | Age: 76
End: 2021-12-21
Payer: MEDICARE

## 2021-12-21 VITALS — RESPIRATION RATE: 18 BRPM | WEIGHT: 260 LBS | HEIGHT: 68 IN | TEMPERATURE: 97.6 F | BODY MASS INDEX: 39.4 KG/M2

## 2021-12-21 DIAGNOSIS — G89.4 CHRONIC PAIN SYNDROME: ICD-10-CM

## 2021-12-21 DIAGNOSIS — M17.11 PRIMARY OSTEOARTHRITIS OF RIGHT KNEE: ICD-10-CM

## 2021-12-21 DIAGNOSIS — M17.11 ARTHRITIS OF KNEE, RIGHT: ICD-10-CM

## 2021-12-21 DIAGNOSIS — G57.11 MERALGIA PARAESTHETICA, RIGHT: ICD-10-CM

## 2021-12-21 DIAGNOSIS — M47.816 SPONDYLOSIS OF LUMBAR REGION WITHOUT MYELOPATHY OR RADICULOPATHY: Primary | ICD-10-CM

## 2021-12-21 PROCEDURE — 1036F TOBACCO NON-USER: CPT | Performed by: NURSE PRACTITIONER

## 2021-12-21 PROCEDURE — G8484 FLU IMMUNIZE NO ADMIN: HCPCS | Performed by: NURSE PRACTITIONER

## 2021-12-21 PROCEDURE — 99213 OFFICE O/P EST LOW 20 MIN: CPT | Performed by: NURSE PRACTITIONER

## 2021-12-21 PROCEDURE — 4040F PNEUMOC VAC/ADMIN/RCVD: CPT | Performed by: NURSE PRACTITIONER

## 2021-12-21 PROCEDURE — G8417 CALC BMI ABV UP PARAM F/U: HCPCS | Performed by: NURSE PRACTITIONER

## 2021-12-21 PROCEDURE — 1123F ACP DISCUSS/DSCN MKR DOCD: CPT | Performed by: NURSE PRACTITIONER

## 2021-12-21 PROCEDURE — G8427 DOCREV CUR MEDS BY ELIG CLIN: HCPCS | Performed by: NURSE PRACTITIONER

## 2021-12-21 RX ORDER — GABAPENTIN 300 MG/1
300 CAPSULE ORAL NIGHTLY
Qty: 90 CAPSULE | Refills: 0 | Status: SHIPPED | OUTPATIENT
Start: 2021-12-21 | End: 2022-03-24 | Stop reason: SDUPTHER

## 2021-12-21 RX ORDER — OXYCODONE HYDROCHLORIDE AND ACETAMINOPHEN 5; 325 MG/1; MG/1
1 TABLET ORAL 2 TIMES DAILY PRN
Qty: 60 TABLET | Refills: 0 | Status: SHIPPED | OUTPATIENT
Start: 2021-12-28 | End: 2022-01-25 | Stop reason: SDUPTHER

## 2021-12-21 ASSESSMENT — ENCOUNTER SYMPTOMS
BACK PAIN: 1
BLOOD IN STOOL: 0
SHORTNESS OF BREATH: 0

## 2021-12-21 NOTE — PROGRESS NOTES
Raul Rodriguez  (1945)    2021    Subjective:     Raul Rodriguez is 68 y.o. male who complains today of:    Chief Complaint   Patient presents with    Back Pain    Knee Pain     bilateral          Allergies:  Caffeine, Codeine, Penicillins, and Shellfish-derived products    Past Medical History:   Diagnosis Date    Chicken pox     Chronic back pain     Congenital heart disease     Mumps     Osteoarthritis     Rheumatic fever      Past Surgical History:   Procedure Laterality Date    CARDIAC SURGERY  03/10/2003, 2005    ALSO STENTS WERE PLACED. Family History   Problem Relation Age of Onset    High Blood Pressure Mother     Stroke Mother     High Blood Pressure Father      Social History     Socioeconomic History    Marital status:      Spouse name: Not on file    Number of children: Not on file    Years of education: Not on file    Highest education level: Not on file   Occupational History    Not on file   Tobacco Use    Smoking status: Former Smoker     Packs/day: 0.50     Years: 45.00     Pack years: 22.50     Types: Cigarettes     Quit date: 3/1/2005     Years since quittin.8    Smokeless tobacco: Never Used   Substance and Sexual Activity    Alcohol use: No     Alcohol/week: 0.0 standard drinks     Comment: RARELY    Drug use: Not on file    Sexual activity: Not on file   Other Topics Concern    Not on file   Social History Narrative    Not on file     Social Determinants of Health     Financial Resource Strain:     Difficulty of Paying Living Expenses: Not on file   Food Insecurity:     Worried About [address] in the Last Year: Not on file    Chery of Food in the Last Year: Not on file   Transportation Needs:     Lack of Transportation (Medical): Not on file    Lack of Transportation (Non-Medical):  Not on file   Physical Activity:     Days of Exercise per Week: Not on file    Minutes of Exercise per Session: Not on file Stress:     Feeling of Stress : Not on file   Social Connections:     Frequency of Communication with Friends and Family: Not on file    Frequency of Social Gatherings with Friends and Family: Not on file    Attends Yazidism Services: Not on file    Active Member of [address] [place] [address] [employer] or Organizations: Not on file    Attends Club or Organization Meetings: Not on file    Marital Status: Not on file   Intimate Partner Violence:     Fear of Current or Ex-Partner: Not on file    Emotionally Abused: Not on file    Physically Abused: Not on file    Sexually Abused: Not on file   Housing Stability:     Unable to Pay for Housing in the Last Year: Not on file    Number of Marcos in the Last Year: Not on file    Unstable Housing in the Last Year: Not on file       Current Outpatient Medications on File Prior to Visit   Medication Sig Dispense Refill    Clopidogrel Bisulfate (PLAVIX PO) Take by mouth      Handicap Placard MISC by Does not apply route Valid 9/1/19- 9/1/23 1 each 0    allopurinol (ZYLOPRIM) 100 MG tablet Take 200 mg by mouth daily      aspirin 81 MG tablet Take 81 mg by mouth daily      Omega-3 Fatty Acids (FISH OIL) 1000 MG CPDR Take 2 tablets by mouth 2 times daily      NONFORMULARY as needed Indications: METALAZONE 25MG  1 TAB PRN      omeprazole (PRILOSEC) 20 MG capsule Take 20 mg by mouth daily      ezetimibe-simvastatin (VYTORIN) 10-20 MG per tablet Take 1 tablet by mouth nightly      carvedilol (COREG) 25 MG tablet       ONE TOUCH ULTRA TEST strip   0    ONETOUCH DELICA LANCETS 80M MISC   0    metFORMIN (GLUCOPHAGE) 1000 MG tablet       montelukast (SINGULAIR) 10 MG tablet       NITROSTAT 0.4 MG SL tablet   0    potassium chloride SA (K-DUR;KLOR-CON M) 20 MEQ tablet       spironolactone (ALDACTONE) 25 MG tablet   0    torsemide (DEMADEX) 100 MG tablet        No current facility-administered medications on file prior to visit.            Pt presents today for a f/u of chronic low back and right knee pain. Having pacemaker/ICD two months ago. He will restart PT in January at this office. Pt feels pain level and functioning improves with prescribed medications and is able to walk without cane. Pt feels that walking & cold weather aggravates the pain. Pt denies radiating numbness and tingling. Gel injections with Dr. Natividad Stephens in past.  He says he did see ortho. Cannot have MRI. He says has knee brace and cane on occasion. had cardiac valve repair in July 2020. He has tried Rt SI joint and facet joint injections in the past with relief at that time. 8/9/21 R knee inj  4/5/21 BL gel one knees        Review of Systems   Constitutional: Negative for appetite change and fever. HENT: Negative for hearing loss. Eyes: Negative for visual disturbance. Respiratory: Negative for shortness of breath. Gastrointestinal: Negative for blood in stool. Genitourinary: Negative for difficulty urinating and hematuria. Musculoskeletal: Positive for arthralgias and back pain. Skin: Negative for rash. Neurological: Negative for facial asymmetry. Hematological: Negative for adenopathy. Psychiatric/Behavioral: Negative for self-injury. All other systems reviewed and are negative. Objective:     Vitals:  Temp 97.6 °F (36.4 °C) (Infrared)   Resp 18   Ht 5' 8\" (1.727 m)   Wt 260 lb (117.9 kg)   BMI 39.53 kg/m² Pain Score:   4      Physical Exam  Vitals and nursing note reviewed.            Pt is alert and oriented x 3.  Recent and remote memory is intact.  Mood, judgement and affect are normal.  No signs of distress or SOB noted.  Visualized skin intact.  Sensation intact to light touch. Decreased ROM with flexion and extension of low back.  Tender with palpation to bilateral lumbar spine with positive provacative maneuvers noted.  Negative SLR.   Pt is unable to briefly heel walk and toe walk.   Strength, balance, and coordination are diminished but functional for ambulation. to, constipation, nausea or vomiting, sedation, urinary retention, dependence and possible addiction. Pt agrees to use medication as directed. Pt advised to not use opiates while driving or operating heavy equipment, or in situations where pt may harm him/herself or others.  Pt is aware that while on narcotics, pt needs to be seen monthly to reassess pain and need for continued medication.  NDP reviewed. Alvaro Pina was reviewed.  This NP saw pt under direct supervision of Dr. Regine Mac    Follow up:  Return in about 4 weeks (around 1/18/2022) for review meds and reassess pain.     Babs Macias, APRN - CNP

## 2022-01-04 ENCOUNTER — HOSPITAL ENCOUNTER (OUTPATIENT)
Dept: PHYSICAL THERAPY | Age: 77
Setting detail: THERAPIES SERIES
Discharge: HOME | End: 2022-01-04
Payer: MEDICARE

## 2022-01-04 PROCEDURE — 97162 PT EVAL MOD COMPLEX 30 MIN: CPT

## 2022-01-04 PROCEDURE — 97110 THERAPEUTIC EXERCISES: CPT

## 2022-01-04 ASSESSMENT — PAIN DESCRIPTION - DESCRIPTORS: DESCRIPTORS: STABBING;ACHING;SORE

## 2022-01-04 ASSESSMENT — PAIN - FUNCTIONAL ASSESSMENT: PAIN_FUNCTIONAL_ASSESSMENT: PREVENTS OR INTERFERES WITH ALL ACTIVE AND SOME PASSIVE ACTIVITIES

## 2022-01-04 ASSESSMENT — PAIN DESCRIPTION - ORIENTATION: ORIENTATION: RIGHT

## 2022-01-04 ASSESSMENT — PAIN DESCRIPTION - LOCATION: LOCATION: BACK

## 2022-01-04 ASSESSMENT — PAIN DESCRIPTION - PAIN TYPE: TYPE: CHRONIC PAIN

## 2022-01-04 ASSESSMENT — PAIN DESCRIPTION - FREQUENCY: FREQUENCY: CONTINUOUS

## 2022-01-04 ASSESSMENT — PAIN DESCRIPTION - ONSET: ONSET: AWAKENED FROM SLEEP

## 2022-01-04 ASSESSMENT — PAIN SCALES - GENERAL: PAINLEVEL_OUTOF10: 6

## 2022-01-04 NOTE — PROGRESS NOTES
[facility] ([facility]) Physical Therapy-  [place]  PHYSICAL THERAPY EVALUATION    Date: 2022  Patient Name: Patricia Bowers       MRN: [id number]   Account: [de-identified]   : 1945  (68 y.o.)   Gender: male   Referring Practitioner: FRANK Canales CNP                 Diagnosis: Lumbar spondylosis and R knee arthritis  Treatment Diagnosis: LBP with R knee pain and strength and ROM deficits. Additional Pertinent Hx: OA, cardiac stents, pacemaker , replaced , DM, COPD, carpel tunnel, MI x 2              Past Medical History:  has a past medical history of Chicken pox, Chronic back pain, Congenital heart disease, Mumps, Osteoarthritis, and Rheumatic fever. Past Surgical History:   has a past surgical history that includes Cardiac surgery (03/10/2003, 2005). Vital Signs  Patient Currently in Pain: Yes   Pain Screening  Patient Currently in Pain: Yes  Pain Assessment  Pain Assessment: 0-10  Pain Level: 6  Pain Type: Chronic pain  Pain Location: Back  Pain Orientation: Right  Pain Radiating Towards: denies NT or radicular symptoms at this time. Pain Descriptors: Stabbing;Aching; Sore  Pain Frequency: Continuous  Pain Onset: Awakened from sleep  Functional Pain Assessment: Prevents or interferes with all active and some passive activities (25% current function of 50%.   Standing limited to less than or equal 5 min.)         Lives With: Friend(s)  Home Layout: Two level  Home Access: Stairs to enter with rails  Entrance Stairs - Number of Steps: 9 to bedroom downstairs  Bathroom Shower/Tub: Tub/Shower unit  Home Equipment: Rolling walker;Cane (currently not using device)  ADL Assistance: Independent  Homemaking Assistance: Independent  Homemaking Responsibilities: Yes  Ambulation Assistance: Independent  Transfer Assistance: Independent  Active : Yes  Mode of Transportation: Car  Occupation: Retired  Leisure & Hobbies: fair and steam shows,        Subjective:  Subjective: Pt presents to therapy for evaluation of back and R knee. Last therapy was interrupted due to pacemaker placement. Pt was trying to do some ther ex HEP however reports \"arthritis all over\". Pt taking pain medication to manage pain and uses ice occassionally when pain higher. Comments: xray=R knee moderate to severe medial patellofemoral OA    Objective:      Balance  Posture: Good  Sitting - Static: Good  Sitting - Dynamic: Good  Standing - Static: Fair,Good  Standing - Dynamic: Fair  Single Leg Stance R Le  Single Leg Stance L Le  Comments: 1/4 squat, heel raise x 5 B unable to single leg heel raise    Ambulation 1  Surface: carpet  Device: No Device  Assistance: Modified Independent  Quality of Gait: increase lat sway  Gait Deviations: Slow Radha,Increased TORITO,Decreased step length  Distance: 50' x 1  Stairs  # Steps : 1  Stairs Height: 6\"     Transfers  Sit to Stand: Modified independent  Stand to sit: Modified independent  Bed to Chair: Modified independent    Strength RLE  Comment: Hip flex 3+ to 4-/5, hip abd(sidelying) 3 to 3+/5, knee flx 3+ to 4-/5, knee ext 4/5  Strength LLE  Comment: Hip flex 3+/5, hip abd(sidelying)4-/5, knee flx 4-/5, knee ext 4+/5      AROM RLE (degrees)  RLE General AROM: Hip flex 40, knee -8-115, ankle WFL limited PF and Ev     AROM LLE (degrees)  LLE General AROM: Hip flex 50, knee 0-115, ankle WFL limited in PF and EV     Spine  Lumbar: Flex <25% with pain, Ext <25% with pain, SB 50% with pain B  SB  Special Tests: SLUMP(-), SLR(-), KRISTINA(min tightness B), ELY's(unable to position), Crossed SLR(-), Scour(- B), distraction (no change pain), (+) anterior drawer R,  compression(no change), Knee varus stress(-), valgus stress(-), Macey's(pain), Apleys compression (-)    Observation/Palpation  Posture: Fair  Palpation: lumbar paraspinal tightness, tenderness Suprapatella region.   Observation: Fwd head rouded shoulders, protracted scap, decreased wt shift on R LE  Edema: infrapatellar region  Bed mobility  Rolling to Left: Contact guard assistance  Rolling to Right: Minimal assistance  Supine to Sit: Moderate assistance  Sit to Supine: Minimal assistance     Additional Measures  Flexibility: Hamsting flexibility -35°R, -38°L at 90/90 hip/knee position. Exercises:   Exercises  Exercise 2: ham stretch (active) 1 x 20 secs  Exercise 3: LTR x 3 each, 5 secs  Exercise 4: SLR 1 x 5, 5 secs  Exercise 5: bridging x 2, 5 secs  Exercise 8: T ball hamstring curl *  Exercise 9: Knee ext(table) x , 5 secs, 5 # ankle wts*  Exercise 11: Bike: (sport) x 2 mins(wall clock)*  Exercise 20: review and demo current therex for HEP  Modalities:  Modalities  Moist heat: *  Cryotherapy (Minutes\Location): *  E-stim (parameters): pacemaker     *Indicates exercise,modality, or manual techniques to be initiated when appropriate  Assessment: Body structures, Functions, Activity limitations: Decreased ROM,Decreased strength,Increased pain,Decreased posture,Decreased functional mobility   Assessment: The pt's impairments currently limit functional abilities by 32-62% including his abilities to stand, perform recreational actvities, and perform household/work related duties without pain or limitations. Skilled PT required to address about deficits to improve over function and return to prior level of function.   Prognosis: Good  Discharge Recommendations: Continue to assess pending progress        Decision Making: Medium Complexity  History: OA, cardiac stents, MI x 2, COPD, DM, Pacemaker with replacement 9/21  Exam: Mod Oswestry 16/50=68% functional, LEFS 30/54=259% functional  Clinical Presentation: evolving        Plan  Frequency/Duration:  Plan  Times per week: 2  Plan weeks: 4-6  Current Treatment Recommendations: Strengthening,ROM,Home Exercise Program,Manual Therapy - Soft Tissue Mobilization,Modalities     Patient Education  New Education Provided: PT Education: Goals;PT Role;Plan of Care;Home Exercise increase in ADL tolerance.          PT Individual Minutes  Time In: 4816  Time Out: 1208  Minutes: 37  Timed Code Treatment Minutes: 8 Minutes  Procedure Minutes:29 min Eval     Timed Activity Minutes Units   Ther Ex 8 1     Electronically signed by Samanta Collazo PT on 1/4/22 at 2:36 PM EST

## 2022-01-04 NOTE — PROGRESS NOTES
[address]  [address]  VTTGA:[phone]    [x] Certification  [] Recertification [x]  Plan of Care  [] Progress Note [] Discharge      To:  FRANK Canales - CNP      From:  Snow Araiza PT  Patient: Patricia Bowers     : 1945  Diagnosis: Lumbar spondylosis and R knee arthritis     Date: 2022  Treatment Diagnosis: LBP with R knee pain and strength and ROM deficits. Plan of Care/Certification Expiration Date: 22  Progress Report Period from:  2022  to 2022    Total # of Visits to Date: 1   No Show: 0    Canceled Appointment: 0     OBJECTIVE:   Short Term Goals - Time Frame for Short term goals: 2 weeks    Goals Current/Discharge status  Met   Short term goal 1: Decrease lumbar and R knee pain 50% to assist with improved functional gains. Pain Descriptors: Stabbing,Aching,Sore   Pain Location: Back    Pain Level: 6 [] yes  [x] no     Long Term Goals - Time Frame for Long term goals : 4-6 weeks  Goals Current/ Discharge status Met   Long term goal 1: Indep HEP for symptom management Written HEP initiated  for symptom management  Needs progression for comprehensive program development. [] yes  [x] no   Long term goal 2: Pt demo improved overall function by reporting greater than 80% per functional survey score Exam: Mod Oswestry 16/50=68% functional, LEFS 30/80=38% functional   [] yes  [x] no   Long term goal 3: Improve B hip/knee  strength 4/5 to 4+/5 to allow patient to perfrom bed mobility with min to no assist.  Strength RLE  Comment: Hip flex 3+ to 4-/5, hip abd(sidelying) 3 to 3+/5, knee flx 3+ to 4-/5, knee ext 4/5  Strength LLE  Comment: Hip flex 3+/5, hip abd(sidelying)4-/5, knee flx 4-/5, knee ext 4+/5 [] yes  [x] no   Long term goal 4: Pain-free lumbar AROM to 50% WNL allowing an increase in ADL tolerance.  Lumbar: Flex <25% with pain, Ext <25% with pain, SB 50% with pain B  SB    [] yes  [x] no   Long term goal 5: Pain-free R knee AROM to 0-125 deg  WNL allowing an increase in ADL tolerance. AROM RLE (degrees)  RLE General AROM: Hip flex 40, knee -8-115, ankle WFL limited PF and Ev     [] yes  [x] no        Body structures, Functions, Activity limitations: Decreased ROM,Decreased strength,Increased pain,Decreased posture,Decreased functional mobility   Assessment: The pt's impairments currently limit functional abilities by 32-62% including his abilities to stand, perform recreational actvities, and perform household/work related duties without pain or limitations. Skilled PT required to address about deficits to improve over function and return to prior level of function. Prognosis: Good  Discharge Recommendations: Continue to assess pending progress    Special Tests: SLUMP(-), SLR(-), KRISTINA(min tightness B), ELY's(unable to position), Crossed SLR(-), Scour(- B), distraction (no change pain), (+) anterior drawer R,  compression(no change), Knee varus stress(-), valgus stress(-), Macey's(pain), Apleys compression (-)       PT Education: Goals;PT Role;Plan of Care;Home Exercise Program  Patient Education: Written HEP provided    PLAN: [x] Evaluate and Treat  Frequency/Duration:  Plan  Times per week: 2  Plan weeks: 4-6  Current Treatment Recommendations: Strengthening,ROM,Home Exercise Program,Manual Therapy - Soft Tissue Mobilization,Modalities                         Patient Status:[x] Continue/ Initiate plan of Care    [] Discharge PT. Recommend pt continue with HEP. [] Additional visits requested, Please re-certify for additional visits:          Signature: Electronically signed by Stanton Payan PT on 1/4/22 at 2:45 PM EST      If you have any questions or concerns, please don't hesitate to call. Thank you for your referral.    I have reviewed this plan of care and certify a need for medically necessary rehabilitation services.     Physician Signature:__________________________________________________________  Date:  Please sign and return

## 2022-01-11 ENCOUNTER — HOSPITAL ENCOUNTER (OUTPATIENT)
Dept: PHYSICAL THERAPY | Age: 77
Setting detail: THERAPIES SERIES
Discharge: HOME | End: 2022-01-11
Payer: MEDICARE

## 2022-01-11 PROCEDURE — 97110 THERAPEUTIC EXERCISES: CPT

## 2022-01-11 ASSESSMENT — PAIN DESCRIPTION - PAIN TYPE: TYPE: CHRONIC PAIN

## 2022-01-11 ASSESSMENT — PAIN DESCRIPTION - FREQUENCY: FREQUENCY: INTERMITTENT

## 2022-01-11 ASSESSMENT — PAIN DESCRIPTION - ORIENTATION: ORIENTATION: RIGHT

## 2022-01-11 ASSESSMENT — PAIN SCALES - GENERAL: PAINLEVEL_OUTOF10: 4

## 2022-01-11 ASSESSMENT — PAIN DESCRIPTION - PROGRESSION: CLINICAL_PROGRESSION: NOT CHANGED

## 2022-01-11 ASSESSMENT — PAIN DESCRIPTION - ONSET: ONSET: AWAKENED FROM SLEEP

## 2022-01-11 ASSESSMENT — PAIN DESCRIPTION - LOCATION: LOCATION: BACK

## 2022-01-11 NOTE — PROGRESS NOTES
Daim Carlson  [address]  IBSSA:[phone]        Date: 2022  Patient: Radha Vieira  : 1945  ACCT #: [de-identified]  Referring Practitioner: FRANK Durand CNP  Diagnosis: Lumbar spondylosis and R knee arthritis  Treatment Diagnosis: LBP with R knee pain and strength and ROM deficits. Visit Information:  PT Visit Information  Onset Date: 20  PT Insurance Information: Medicare/Medical [place]  Total # of Visits Approved:  (BMN)  Total # of Visits to Date: 2  Plan of Care/Certification Expiration Date: 22  No Show: 0  Canceled Appointment: 0  Progress Note Counter:  (next PN due 22)       Subjective:  Pt stated that his low back is pretty stiff today and just overall feels real weak. Comments: xray=R knee moderate to severe medial patellofemoral OA  HEP Compliance:  [] Good [x] Fair [] Poor [] Reports not doing due to:    Vital Signs  Patient Currently in Pain: Yes    Pain Screening  Patient Currently in Pain: Yes  Pain Assessment  Pain Assessment: 0-10  Pain Level: 4  Pain Type: Chronic pain  Pain Location: Back  Pain Orientation: Right  Pain Radiating Towards: low back and R knee  Pain Descriptors: Tightness;Sore;Stabbing;Aching  Pain Frequency: Intermittent  Pain Onset: Awakened from sleep  Clinical Progression: Not changed    OBJECTIVE:   Exercises  Exercise 2: ham stretch supine 3 x 15 secs  Exercise 3: LTR x 3 each, 5 secs  Exercise 4: SLR 1 x 7, 5 secs  Exercise 5: bridging x10 , 5 secs  Exercise 8: T ball hamstring curl *  Exercise 9: Knee ext(table) x , 5 secs, 5 # ankle wts*-not completed  Exercise 11: Bike: (sport) x 5 mins(wall clock)*  Exercise 20: review and demo current therex for HEP        Strength: [x] NT  [] MMT completed:   ROM: [x] NT  [] ROM measurements:      *Indicates exercise, modality, or manual techniques to be initiated when appropriate    Assessment:     Body structures, Functions, Activity limitations: Decreased ROM,Decreased strength,Increased pain,Decreased posture,Decreased functional mobility   Assessment: Reviewed with pt on lumbar rom, LE strength in supine position increasing his reps this visit. Pt overall tolerated seession well with assistance required from supine to sit. Treatment Diagnosis: LBP with R knee pain and strength and ROM deficits. Prognosis: Good     Goals:  Short term goals  Time Frame for Short term goals: 2 weeks  Short term goal 1: Decrease lumbar and R knee pain 50% to assist with improved functional gains. Long term goals  Time Frame for Long term goals : 4-6 weeks  Long term goal 1: Indep HEP for symptom management  Long term goal 2: Pt demo improved overall function by reporting greater than 80% per functional survey score  Long term goal 3: Improve B hip/knee  strength 4/5 to 4+/5 to allow patient to perfrom bed mobility with min to no assist.  Long term goal 4: Pain-free lumbar AROM to 50% WNL allowing an increase in ADL tolerance. Long term goal 5: Pain-free R knee AROM to 0-125 deg  WNL allowing an increase in ADL tolerance. Progress toward goals: Continued with LE strength working towards LTG #3. POST-PAIN       Pain Rating (0-10 pain scale):   3/10   Location and pain description same as pre-treatment unless indicated. Action: [] NA   [x] Perform HEP  [] Meds as prescribed  [] Modalities as prescribed   [] Call Physician     Frequency/Duration:  Plan  Times per week: 2  Plan weeks: 4-6  Current Treatment Recommendations: Strengthening,ROM,Home Exercise Program,Manual Therapy - Soft Tissue Mobilization,Modalities     Pt to continue current HEP. See objective section for any therapeutic exercise changes, additions or modifications this date.       PT Individual Minutes  Time In: 0439  Time Out: 1405  Minutes: 60  Timed Code Treatment Minutes: 45 Minutes  Procedure Minutes:n/a   Timed Activity Minutes Units   Ther Ex 45 3       Signature:  Electronically signed by Ant Alva PTA on 1/11/22 at 1:08 PM EST

## 2022-01-13 ENCOUNTER — HOSPITAL ENCOUNTER (OUTPATIENT)
Dept: PHYSICAL THERAPY | Age: 77
Setting detail: THERAPIES SERIES
Discharge: HOME | End: 2022-01-13
Payer: MEDICARE

## 2022-01-13 PROCEDURE — 97110 THERAPEUTIC EXERCISES: CPT

## 2022-01-13 ASSESSMENT — PAIN DESCRIPTION - DESCRIPTORS: DESCRIPTORS: TIGHTNESS

## 2022-01-13 ASSESSMENT — PAIN DESCRIPTION - ORIENTATION: ORIENTATION: RIGHT

## 2022-01-13 ASSESSMENT — PAIN DESCRIPTION - PAIN TYPE: TYPE: CHRONIC PAIN

## 2022-01-13 ASSESSMENT — PAIN SCALES - GENERAL: PAINLEVEL_OUTOF10: 7

## 2022-01-13 ASSESSMENT — PAIN DESCRIPTION - LOCATION: LOCATION: BACK;KNEE

## 2022-01-13 ASSESSMENT — PAIN DESCRIPTION - PROGRESSION: CLINICAL_PROGRESSION: NOT CHANGED

## 2022-01-13 NOTE — PROGRESS NOTES
Rod Carlson  [address]  TPQOO:[phone]        Date: 2022  Patient: Urbano Anne  : 1945  ACCT #: [de-identified]  Referring Practitioner: Malissa Lanes, APRN - CNP  Diagnosis: Lumbar spondylosis and R knee arthritis  Treatment Diagnosis: LBP with R knee pain and strength and ROM deficits. Visit Information:  PT Visit Information  Onset Date: 20  PT Insurance Information: Medicare/Medical [place]  Total # of Visits Approved:  (BMN)  Total # of Visits to Date: 3  Plan of Care/Certification Expiration Date: 22  No Show: 0  Canceled Appointment: 0  Progress Note Counter: 3/8 (next PN due 22)    Subjective: Pt states that his low back and knees are really stiff today. Right knee worse than left. Comments: xray=R knee moderate to severe medial patellofemoral OA  HEP Compliance:  [] Good [] Fair [x] Poor [] Reports not doing due to:      Vital Signs  Patient Currently in Pain: Yes   Pain Screening  Patient Currently in Pain: Yes  Pain Assessment  Pain Assessment: 0-10  Pain Level: 7  Pain Type: Chronic pain  Pain Location: Back;Knee  Pain Orientation: Right  Pain Descriptors: Tightness  Clinical Progression: Not changed    OBJECTIVE:   Exercises  Exercise 1: Bike: (sport) x 2.5 min (watch timer)  Exercise 2: Ham stretch supine 5 x 20\"  Exercise 3: LTR 10 x 5\"  Exercise 4: SLR 2 x 5, 5 sec  Exercise 5: Bridging 10 x 5\"  Exercise 8: T ball hamstring curl 10 x 5\"  Exercise 9: Knee ext(table) x , 5 secs, 5 # ankle wts*-not completed  Exercise 20: review and demo current therex for HEP    *Indicates exercise, modality, or manual techniques to be initiated when appropriate    Assessment: Body structures, Functions, Activity limitations: Decreased ROM,Decreased strength,Increased pain,Decreased posture,Decreased functional mobility   Assessment: Pt limited to 2.5 min on RB d/t fatigue. Tolerated gentle lumbar/knee stab therex well. Required assist from supine to sit. Pt fatigued with ex's. Treatment Diagnosis: LBP with R knee pain and strength and ROM deficits. Prognosis: Good    Goals:  Short term goals  Time Frame for Short term goals: 2 weeks  Short term goal 1: Decrease lumbar and R knee pain 50% to assist with improved functional gains. Long term goals  Time Frame for Long term goals : 4-6 weeks  Long term goal 1: Indep HEP for symptom management  Long term goal 2: Pt demo improved overall function by reporting greater than 80% per functional survey score  Long term goal 3: Improve B hip/knee  strength 4/5 to 4+/5 to allow patient to perfrom bed mobility with min to no assist.  Long term goal 4: Pain-free lumbar AROM to 50% WNL allowing an increase in ADL tolerance. Long term goal 5: Pain-free R knee AROM to 0-125 deg  WNL allowing an increase in ADL tolerance. Progress toward goals: ongoing, working toward LTG 3 & 4    POST-PAIN       Pain Rating (0-10 pain scale):  \"same\" 7/10 knees, LB \"better\"    Location and pain description same as pre-treatment unless indicated. Action: [] NA   [x] Perform HEP  [x] Meds as prescribed  [] Modalities as prescribed   [] Call Physician     Frequency/Duration:  Plan  Times per week: 2  Plan weeks: 4-6  Current Treatment Recommendations: Strengthening,ROM,Home Exercise Program,Manual Therapy - Soft Tissue Mobilization,Modalities     Pt to continue current HEP. See objective section for any therapeutic exercise changes, additions or modifications this date.     PT Individual Minutes  Time In: 4885  Time Out: 1931  Minutes: 35  Timed Code Treatment Minutes: 34 Minutes     Timed Activity Minutes Units   Ther Ex 34 2     Signature:  Electronically signed by Augusta Choe PTA on 1/13/22 at 2:01 PM EST

## 2022-01-18 ENCOUNTER — HOSPITAL ENCOUNTER (OUTPATIENT)
Dept: PHYSICAL THERAPY | Age: 77
Setting detail: THERAPIES SERIES
Discharge: HOME | End: 2022-01-18
Payer: MEDICARE

## 2022-01-18 PROCEDURE — 97110 THERAPEUTIC EXERCISES: CPT

## 2022-01-18 ASSESSMENT — PAIN DESCRIPTION - PROGRESSION: CLINICAL_PROGRESSION: NOT CHANGED

## 2022-01-18 ASSESSMENT — PAIN DESCRIPTION - FREQUENCY: FREQUENCY: CONTINUOUS

## 2022-01-18 ASSESSMENT — PAIN DESCRIPTION - LOCATION: LOCATION: BACK;NECK

## 2022-01-18 ASSESSMENT — PAIN DESCRIPTION - PAIN TYPE: TYPE: CHRONIC PAIN

## 2022-01-18 ASSESSMENT — PAIN DESCRIPTION - DESCRIPTORS: DESCRIPTORS: TIGHTNESS

## 2022-01-18 ASSESSMENT — PAIN DESCRIPTION - ORIENTATION: ORIENTATION: RIGHT

## 2022-01-18 ASSESSMENT — PAIN SCALES - GENERAL: PAINLEVEL_OUTOF10: 7

## 2022-01-18 NOTE — PROGRESS NOTES
Ricardo Carlson  [address]  VEKRU:[phone]        Date: 2022  Patient: Radha Muro  : 1945  ACCT #: [de-identified]  Referring Practitioner: FRANK Peña CNP  Diagnosis: Lumbar spondylosis and R knee arthritis  Treatment Diagnosis: LBP with R knee pain and strength and ROM deficits. Visit Information:  PT Visit Information  Onset Date: 20  PT Insurance Information: Medicare/Medical [place]  Total # of Visits Approved:  (BMN)  Total # of Visits to Date: 4  Plan of Care/Certification Expiration Date: 22  No Show: 0  Canceled Appointment: 0  Progress Note Counter:  (next PN due 22)    Subjective: Pt states that his low back and knees are really stiff again today. Right knee worse than left. He has a hard time getting in/out of the car. Comments: xray=R knee moderate to severe medial patellofemoral OA  HEP Compliance:  [x] Good [] Fair [] Poor [] Reports not doing due to:    Vital Signs  Patient Currently in Pain: Yes   Pain Screening  Patient Currently in Pain: Yes  Pain Assessment  Pain Assessment: 0-10  Pain Level: 7  Pain Type: Chronic pain  Pain Location: Back;Neck  Pain Orientation: Right  Pain Descriptors: Tightness  Pain Frequency: Continuous  Clinical Progression: Not changed    OBJECTIVE:   Exercises  Exercise 1: Bike: (sport) x 3 min (watch timer)  Exercise 2: Ham stretch supine 5 x 20\"  Exercise 3: LTR 10 x 5\"  Exercise 4: SLR 2 x 5, 5 sec  Exercise 5: Bridging 10 x 5\"  Exercise 8: T ball hamstring curl 10 x 5\"  Exercise 9: Knee ext (table) 5#, 10 x 5\"  Exercise 10: HS curls with GTB 10 x 5\"  Exercise 20: review and demo current therex for HEP    Strength: [x] NT  [] MMT completed:    ROM: [x] NT  [] ROM measurements:    *Indicates exercise, modality, or manual techniques to be initiated when appropriate    Assessment:    Body structures, Functions, Activity limitations: Decreased ROM,Decreased strength,Increased pain,Decreased posture,Decreased functional mobility   Assessment: Pt limited to 3 min on RB d/t fatigue. Performed gentle lumbar/knee stab therex. Required assist from supine to sit. Pt fatigued with ex's. No changes in pain post session. Treatment Diagnosis: LBP with R knee pain and strength and ROM deficits. Prognosis: Good    Goals:  Short term goals  Time Frame for Short term goals: 2 weeks  Short term goal 1: Decrease lumbar and R knee pain 50% to assist with improved functional gains. Long term goals  Time Frame for Long term goals : 4-6 weeks  Long term goal 1: Indep HEP for symptom management  Long term goal 2: Pt demo improved overall function by reporting greater than 80% per functional survey score  Long term goal 3: Improve B hip/knee  strength 4/5 to 4+/5 to allow patient to perfrom bed mobility with min to no assist.  Long term goal 4: Pain-free lumbar AROM to 50% WNL allowing an increase in ADL tolerance. Long term goal 5: Pain-free R knee AROM to 0-125 deg  WNL allowing an increase in ADL tolerance. Progress toward goals: ongoing, working toward LTG 3    POST-PAIN       Pain Rating (0-10 pain scale):   7/10   Location and pain description same as pre-treatment unless indicated. Action: [] NA   [x] Perform HEP  [x] Meds as prescribed  [] Modalities as prescribed   [] Call Physician     Frequency/Duration:  Plan  Times per week: 2  Plan weeks: 4-6  Current Treatment Recommendations: Strengthening,ROM,Home Exercise Program,Manual Therapy - Soft Tissue Mobilization,Modalities    Pt to continue current HEP. See objective section for any therapeutic exercise changes, additions or modifications this date.     PT Individual Minutes  Time In: 1300  Time Out: 2962  Minutes: 47  Timed Code Treatment Minutes: 47 Minutes     Timed Activity Minutes Units   Ther Ex 47 3       Signature:  Electronically signed by Laverne Horta PTA on 1/18/22 at 1:12 PM EST

## 2022-01-20 ENCOUNTER — HOSPITAL ENCOUNTER (OUTPATIENT)
Dept: PHYSICAL THERAPY | Age: 77
Setting detail: THERAPIES SERIES
Discharge: HOME | End: 2022-01-20
Payer: MEDICARE

## 2022-01-25 ENCOUNTER — HOSPITAL ENCOUNTER (OUTPATIENT)
Dept: PHYSICAL THERAPY | Age: 77
Setting detail: THERAPIES SERIES
Discharge: HOME | End: 2022-01-25
Payer: MEDICARE

## 2022-01-25 ENCOUNTER — OFFICE VISIT (OUTPATIENT)
Dept: PAIN MANAGEMENT | Age: 77
End: 2022-01-25
Payer: MEDICARE

## 2022-01-25 VITALS
BODY MASS INDEX: 38.51 KG/M2 | HEIGHT: 69 IN | TEMPERATURE: 97.1 F | SYSTOLIC BLOOD PRESSURE: 134 MMHG | DIASTOLIC BLOOD PRESSURE: 74 MMHG | WEIGHT: 260 LBS

## 2022-01-25 DIAGNOSIS — M47.816 SPONDYLOSIS OF LUMBAR REGION WITHOUT MYELOPATHY OR RADICULOPATHY: Primary | ICD-10-CM

## 2022-01-25 DIAGNOSIS — M17.11 ARTHRITIS OF KNEE, RIGHT: ICD-10-CM

## 2022-01-25 DIAGNOSIS — G89.4 CHRONIC PAIN SYNDROME: ICD-10-CM

## 2022-01-25 DIAGNOSIS — M17.11 PRIMARY OSTEOARTHRITIS OF RIGHT KNEE: ICD-10-CM

## 2022-01-25 PROCEDURE — 1123F ACP DISCUSS/DSCN MKR DOCD: CPT | Performed by: NURSE PRACTITIONER

## 2022-01-25 PROCEDURE — 4040F PNEUMOC VAC/ADMIN/RCVD: CPT | Performed by: NURSE PRACTITIONER

## 2022-01-25 PROCEDURE — G8417 CALC BMI ABV UP PARAM F/U: HCPCS | Performed by: NURSE PRACTITIONER

## 2022-01-25 PROCEDURE — 1036F TOBACCO NON-USER: CPT | Performed by: NURSE PRACTITIONER

## 2022-01-25 PROCEDURE — 97110 THERAPEUTIC EXERCISES: CPT

## 2022-01-25 PROCEDURE — G8427 DOCREV CUR MEDS BY ELIG CLIN: HCPCS | Performed by: NURSE PRACTITIONER

## 2022-01-25 PROCEDURE — 99214 OFFICE O/P EST MOD 30 MIN: CPT | Performed by: NURSE PRACTITIONER

## 2022-01-25 PROCEDURE — G8484 FLU IMMUNIZE NO ADMIN: HCPCS | Performed by: NURSE PRACTITIONER

## 2022-01-25 RX ORDER — OXYCODONE HYDROCHLORIDE AND ACETAMINOPHEN 5; 325 MG/1; MG/1
1 TABLET ORAL 2 TIMES DAILY PRN
Qty: 60 TABLET | Refills: 0 | Status: SHIPPED | OUTPATIENT
Start: 2022-01-28 | End: 2022-02-24 | Stop reason: SDUPTHER

## 2022-01-25 ASSESSMENT — PAIN DESCRIPTION - PROGRESSION: CLINICAL_PROGRESSION: NOT CHANGED

## 2022-01-25 ASSESSMENT — PAIN DESCRIPTION - FREQUENCY: FREQUENCY: CONTINUOUS

## 2022-01-25 ASSESSMENT — PAIN DESCRIPTION - LOCATION: LOCATION: BACK

## 2022-01-25 ASSESSMENT — ENCOUNTER SYMPTOMS
BACK PAIN: 1
BLOOD IN STOOL: 0
SHORTNESS OF BREATH: 0

## 2022-01-25 ASSESSMENT — PAIN SCALES - GENERAL: PAINLEVEL_OUTOF10: 4

## 2022-01-25 ASSESSMENT — PAIN DESCRIPTION - ORIENTATION: ORIENTATION: LOWER

## 2022-01-25 ASSESSMENT — PAIN DESCRIPTION - DESCRIPTORS: DESCRIPTORS: TIGHTNESS

## 2022-01-25 ASSESSMENT — PAIN DESCRIPTION - ONSET: ONSET: AWAKENED FROM SLEEP

## 2022-01-25 ASSESSMENT — PAIN - FUNCTIONAL ASSESSMENT: PAIN_FUNCTIONAL_ASSESSMENT: PREVENTS OR INTERFERES WITH MANY ACTIVE NOT PASSIVE ACTIVITIES

## 2022-01-25 ASSESSMENT — PAIN DESCRIPTION - PAIN TYPE: TYPE: CHRONIC PAIN

## 2022-01-25 NOTE — PROGRESS NOTES
Yakelin Miller  (1945)    2022    Subjective:     Yakelin Miller is 68 y.o. male who complains today of:    Chief Complaint   Patient presents with    Back Pain     Lumbar    Knee Pain     Right knee is worse but left knee hurts as well          Allergies:  Caffeine, Codeine, Penicillins, and Shellfish-derived products    Past Medical History:   Diagnosis Date    Chicken pox     Chronic back pain     Congenital heart disease     Mumps     Osteoarthritis     Rheumatic fever      Past Surgical History:   Procedure Laterality Date    CARDIAC SURGERY  03/10/2003, 2005    ALSO STENTS WERE PLACED. Family History   Problem Relation Age of Onset    High Blood Pressure Mother     Stroke Mother     High Blood Pressure Father      Social History     Socioeconomic History    Marital status:      Spouse name: Not on file    Number of children: Not on file    Years of education: Not on file    Highest education level: Not on file   Occupational History    Not on file   Tobacco Use    Smoking status: Former Smoker     Packs/day: 0.50     Years: 45.00     Pack years: 22.50     Types: Cigarettes     Quit date: 3/1/2005     Years since quittin.9    Smokeless tobacco: Never Used   Substance and Sexual Activity    Alcohol use: No     Alcohol/week: 0.0 standard drinks     Comment: RARELY    Drug use: Not on file    Sexual activity: Not on file   Other Topics Concern    Not on file   Social History Narrative    Not on file     Social Determinants of Health     Financial Resource Strain:     Difficulty of Paying Living Expenses: Not on file   Food Insecurity:     Worried About [address] in the Last Year: Not on file    Chery of Food in the Last Year: Not on file   Transportation Needs:     Lack of Transportation (Medical): Not on file    Lack of Transportation (Non-Medical):  Not on file   Physical Activity:     Days of Exercise per Week: Not on file    Minutes of Exercise per Session: Not on file   Stress:     Feeling of Stress : Not on file   Social Connections:     Frequency of Communication with Friends and Family: Not on file    Frequency of Social Gatherings with Friends and Family: Not on file    Attends Restoration Services: Not on file    Active Member of [address] [place] [address] or Organizations: Not on file    Attends Club or Organization Meetings: Not on file    Marital Status: Not on file   Intimate Partner Violence:     Fear of Current or Ex-Partner: Not on file    Emotionally Abused: Not on file    Physically Abused: Not on file    Sexually Abused: Not on file   Housing Stability:     Unable to Pay for Housing in the Last Year: Not on file    Number of [place] in the Last Year: Not on file    Unstable Housing in the Last Year: Not on file       Current Outpatient Medications on File Prior to Visit   Medication Sig Dispense Refill    gabapentin (NEURONTIN) 300 MG capsule Take 1 capsule by mouth nightly for 90 days.  90 capsule 0    Clopidogrel Bisulfate (PLAVIX PO) Take by mouth      Handicap Placard MISC by Does not apply route Valid 9/1/19- 9/1/23 1 each 0    allopurinol (ZYLOPRIM) 100 MG tablet Take 200 mg by mouth daily      aspirin 81 MG tablet Take 81 mg by mouth daily      Omega-3 Fatty Acids (FISH OIL) 1000 MG CPDR Take 2 tablets by mouth 2 times daily      NONFORMULARY as needed Indications: METALAZONE 25MG  1 TAB PRN      omeprazole (PRILOSEC) 20 MG capsule Take 20 mg by mouth daily      ezetimibe-simvastatin (VYTORIN) 10-20 MG per tablet Take 1 tablet by mouth nightly      carvedilol (COREG) 25 MG tablet       ONE TOUCH ULTRA TEST strip   0    ONETOUCH DELICA LANCETS 46J MISC   0    metFORMIN (GLUCOPHAGE) 1000 MG tablet       montelukast (SINGULAIR) 10 MG tablet       NITROSTAT 0.4 MG SL tablet   0    potassium chloride SA (K-DUR;KLOR-CON M) 20 MEQ tablet       spironolactone (ALDACTONE) 25 MG tablet   0    torsemide (DEMADEX) 100 MG tablet        No current facility-administered medications on file prior to visit. Pt presents today for a f/u of chronic low back and right knee pain. Having pacemaker/ICD three months ago. He restarted therapy here this month for the back and knees and has gone 6 times. Pt feels pain level and functioning improves with prescribed medications and is able to walk without cane. Pt feels that walking & cold weather aggravates the pain. Pt denies radiating numbness and tingling. Gel injections with Dr. Karen Pompa in past.  He says he did see ortho. Cannot have MRI. He says has knee brace and cane on occasion. had cardiac valve repair in July 2020. He has tried Rt SI joint and facet joint injections in the past with relief at that time. 8/9/21 R knee inj  4/5/21 BL gel one knees        Review of Systems   Constitutional: Negative for appetite change and fever. HENT: Negative for hearing loss. Eyes: Negative for visual disturbance. Respiratory: Negative for shortness of breath. Gastrointestinal: Negative for blood in stool. Genitourinary: Negative for difficulty urinating and hematuria. Musculoskeletal: Positive for arthralgias and back pain. Skin: Negative for rash. Neurological: Negative for facial asymmetry. Hematological: Negative for adenopathy. Psychiatric/Behavioral: Negative for self-injury. All other systems reviewed and are negative. Objective:     Vitals:  /74   Temp 97.1 °F (36.2 °C) (Infrared)   Ht 5' 8.5\" (1.74 m)   Wt 260 lb (117.9 kg)   BMI 38.96 kg/m² Pain Score:   5      Physical Exam  Vitals and nursing note reviewed.            Pt is alert and oriented x 3.  Recent and remote memory is intact.  Mood, judgement and affect are normal.  No signs of distress or SOB noted.  Visualized skin intact.  Sensation intact to light touch.    Decreased ROM with flexion and extension of low back.  Tender with palpation to bilateral lumbar spine with positive provacative maneuvers noted.  Negative SLR.   Pt is unable to briefly heel walk and toe walk.   Strength, balance, and coordination are diminished but functional for ambulation.  Left knee with decreased ROM.  Tenderness noted with palpation. Mild swelling noted with arthritic deformity noted.  Crepitus with ROM.  Gait antalgic.  Uses cane. Assessment:      Diagnosis Orders   1. Spondylosis of lumbar region without myelopathy or radiculopathy  oxyCODONE-acetaminophen (PERCOCET) 5-325 MG per tablet   2. Arthritis of knee, right  oxyCODONE-acetaminophen (PERCOCET) 5-325 MG per tablet   3. Chronic pain syndrome  oxyCODONE-acetaminophen (PERCOCET) 5-325 MG per tablet   4. Primary osteoarthritis of right knee  oxyCODONE-acetaminophen (PERCOCET) 5-325 MG per tablet       Plan:     Periodic Controlled Substance Monitoring: Possible medication side effects, risk of tolerance/dependence & alternative treatments discussed. ,No signs of potential drug abuse or diversion identified. ,Assessed functional status. ,Obtaining appropriate analgesic effect of treatment. (Chele Hayden, APRN - CNP)    Orders Placed This Encounter   Medications    oxyCODONE-acetaminophen (PERCOCET) 5-325 MG per tablet     Sig: Take 1 tablet by mouth 2 times daily as needed for Pain for up to 30 days. Dispense:  60 tablet     Refill:  0     Reduce doses taken as pain becomes manageable       No orders of the defined types were placed in this encounter. Discussed options with the patient today. Continue Percocet and Gabapentin (90 day RX).   Continue PT. All questions were answered.  Pt verbalized understanding and agrees with above plan.  Utox reviewed and appropriate from 6/24/21. Narcan declined 4/29/21.     Will continue medications for chronic pain as they help pt function with ADL and improve quality of life.  Discussed possible risks of opiate medication with pt, including but not limited to, constipation, nausea or vomiting, sedation, urinary retention, dependence and possible addiction. Pt agrees to use medication as directed. Pt advised to not use opiates while driving or operating heavy equipment, or in situations where pt may harm him/herself or others.  Pt is aware that while on narcotics, pt needs to be seen monthly to reassess pain and need for continued medication.  NDP reviewed. Ortiz [facility]ks[place] was reviewed.  This NP saw pt under direct supervision of Dr. Martin Meals    Follow up:  Return in about 4 weeks (around 2/22/2022) for review meds and reassess pain.     Ralph Enrique, APRN - CNP

## 2022-01-25 NOTE — PROGRESS NOTES
Dami Carlson  [address]  HWIFF:[phone]        Date: 2022  Patient: Radha Vieira  : 1945  ACCT #: [de-identified]  Referring Practitioner: FRANK Durand CNP  Diagnosis: Lumbar spondylosis and R knee arthritis  Treatment Diagnosis: LBP with R knee pain and strength and ROM deficits. Visit Information:  PT Visit Information  Onset Date: 20  PT Insurance Information: Medicare/Medical [place]  Total # of Visits Approved:  (BMN)  Total # of Visits to Date: 5  Plan of Care/Certification Expiration Date: 22  No Show: 0  Canceled Appointment: 1  Progress Note Counter:  (next PN due 22    Subjective: Pt states low back not bad unless standing too long. Rates pain 4/10 on VAS coming into PT. 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 It is also difficult loading/unloading .   Comments: xray=R knee moderate to severe medial patellofemoral OA  HEP Compliance:  [] Good [x] Fair [] Poor [] Reports not doing due to:    Vital Signs  Patient Currently in Pain: Yes   Pain Screening  Patient Currently in Pain: Yes  Pain Assessment  Pain Assessment: 0-10  Pain Level: 4  Pain Type: Chronic pain  Pain Location: Back  Pain Orientation: Lower  Pain Descriptors: Tightness  Pain Frequency: Continuous  Pain Onset: Awakened from sleep  Clinical Progression: Not changed  Functional Pain Assessment: Prevents or interferes with many active not passive activities    OBJECTIVE:   Exercises  Exercise 1: Bike: (sport) x 3 min (watch timer)  Exercise 2: Ham stretch supine 5 x 20\"  Exercise 3: LTR 10 x 5\"  Exercise 4: SLR 2 x 5, 5 sec  Exercise 5: Bridging 10 x 5\"  Exercise 8: T ball hamstring curl 10 x 5\"  Exercise 9: Knee ext (table) 5#, 10 x 5\"  Exercise 10: HS curls with GTB (held, time constraint)  Exercise 20: review and demo current therex for HEP    ROM: [] NT  [x] ROM measurements:     AROM RLE (degrees)  RLE General AROM: knee -8- to 117     *Indicates exercise, modality, or manual techniques to be initiated when appropriate    Assessment: Body structures, Functions, Activity limitations: Decreased ROM,Decreased strength,Increased pain,Decreased posture,Decreased functional mobility   Assessment: Pt progressing slowly through lumbar, LE exercises. Continued with LE strengthening exercises performing SLR, bridging, and T ball hamstring curl. Slight improvement noted with R knee flexion AROM. Requires mod assist transferring supine to sit after completing exercises. Demonstrated appropriate technique throughout session without VC's. Treatment Diagnosis: LBP with R knee pain and strength and ROM deficits. Prognosis: Good     Goals:  Short term goals  Time Frame for Short term goals: 2 weeks  Short term goal 1: Decrease lumbar and R knee pain 50% to assist with improved functional gains. Long term goals  Time Frame for Long term goals : 4-6 weeks  Long term goal 1: Indep HEP for symptom management  Long term goal 2: Pt demo improved overall function by reporting greater than 80% per functional survey score  Long term goal 3: Improve B hip/knee  strength 4/5 to 4+/5 to allow patient to perfrom bed mobility with min to no assist.  Long term goal 4: Pain-free lumbar AROM to 50% WNL allowing an increase in ADL tolerance. Long term goal 5: Pain-free R knee AROM to 0-125 deg  WNL allowing an increase in ADL tolerance. Progress toward goals: Pt demonstrates increased R knee flexion AROM. POST-PAIN       Pain Rating (0-10 pain scale):   4/10   Location and pain description same as pre-treatment unless indicated.    Action: [] NA   [x] Perform HEP  [x] Meds as prescribed  [] Modalities as prescribed   [] Call Physician     Frequency/Duration:  Plan  Times per week: 2  Plan weeks: 4-6  Current Treatment Recommendations: Strengthening,ROM,Home Exercise Program,Manual Therapy - Soft Tissue Mobilization,Modalities     Pt to continue current HEP. See objective section for any therapeutic exercise changes, additions or modifications this date.     PT Individual Minutes  Time In: 8904  Time Out: 8161  Minutes: 42  Timed Code Treatment Minutes: 40 Minutes     Timed Activity Minutes Units   Ther Ex 40 3     Signature:  Electronically signed by Michael Crum PTA on 1/25/22 at 2:00 PM EST

## 2022-01-27 ENCOUNTER — HOSPITAL ENCOUNTER (OUTPATIENT)
Dept: PHYSICAL THERAPY | Age: 77
Setting detail: THERAPIES SERIES
Discharge: HOME | End: 2022-01-27
Payer: MEDICARE

## 2022-01-27 PROCEDURE — 97110 THERAPEUTIC EXERCISES: CPT

## 2022-01-27 ASSESSMENT — PAIN DESCRIPTION - PROGRESSION: CLINICAL_PROGRESSION: NOT CHANGED

## 2022-01-27 ASSESSMENT — PAIN DESCRIPTION - DESCRIPTORS: DESCRIPTORS: TIGHTNESS

## 2022-01-27 ASSESSMENT — PAIN DESCRIPTION - LOCATION: LOCATION: BACK

## 2022-01-27 ASSESSMENT — PAIN SCALES - GENERAL: PAINLEVEL_OUTOF10: 5

## 2022-01-27 ASSESSMENT — PAIN DESCRIPTION - FREQUENCY: FREQUENCY: CONTINUOUS

## 2022-01-27 ASSESSMENT — PAIN DESCRIPTION - ONSET: ONSET: AWAKENED FROM SLEEP

## 2022-01-27 ASSESSMENT — PAIN DESCRIPTION - PAIN TYPE: TYPE: CHRONIC PAIN

## 2022-01-27 ASSESSMENT — PAIN DESCRIPTION - ORIENTATION: ORIENTATION: LOWER

## 2022-01-27 ASSESSMENT — PAIN - FUNCTIONAL ASSESSMENT: PAIN_FUNCTIONAL_ASSESSMENT: PREVENTS OR INTERFERES WITH MANY ACTIVE NOT PASSIVE ACTIVITIES

## 2022-01-27 NOTE — PROGRESS NOTES
Nnam[address] Dr. Robyn Can, [address] [place] [address]  [phone]        Date: 2022  Patient: Aaliyah Tapia  : 1945  ACCT #: [de-identified]  Referring Practitioner: FRANK Tolliver CNP  Diagnosis: Lumbar spondylosis and R knee arthritis  Treatment Diagnosis: LBP with R knee pain and strength and ROM deficits. Visit Information:  PT Visit Information  Onset Date: 20  PT Insurance Information: Medicare/Medical [place]  Total # of Visits Approved:  (BMN)  Total # of Visits to Date: 6  Plan of Care/Certification Expiration Date: 22  No Show: 0  Canceled Appointment: 1  Progress Note Counter:  (next PN due 22    Subjective: Pt states low back was not bad, but walking on snow covered ground aggravated/tightened low back.   Comments: xray=R knee moderate to severe medial patellofemoral OA  HEP Compliance:  [x] Good [] Fair [] Poor [] Reports not doing due to:    Vital Signs  Patient Currently in Pain: Yes   Pain Screening  Patient Currently in Pain: Yes  Pain Assessment  Pain Assessment: 0-10  Pain Level: 5  Pain Type: Chronic pain  Pain Location: Back  Pain Orientation: Lower  Pain Descriptors: Tightness  Pain Frequency: Continuous  Pain Onset: Awakened from sleep  Clinical Progression: Not changed  Functional Pain Assessment: Prevents or interferes with many active not passive activities    OBJECTIVE:   Exercises  Exercise 1: Bike: (sport) x 3 min (watch timer)  Exercise 2: Ham stretch supine 5 x 20\"  Exercise 3: LTR 10 x 5\"  Exercise 4: SLR 2 x 5, 5 sec  Exercise 5: Bridging 10 x 5\"  Exercise 6: T ball hamstring curl 10 x 5\"  Exercise 8: side lying hip abd x 5, 5 secs  Exercise 9: Knee ext (table) 5#, 10 x 5\"  Exercise 20: HEP: has previous low back, LE HEP       Strength: [] NT  [x] MMT completed:  Strength RLE  Comment: Hip flex 4- to 4/5, hip abd(sidelying) 4-/5, knee flx 3+ to 4-/5, knee ext 4/5  Strength LLE  Comment: Hip flex 4-/5, hip abd(sidelying)4/5, knee flx 4-/5, knee ext 4+/5     ROM: [] NT  [x] ROM measurements:     AROM RLE (degrees)  RLE General AROM: knee -8- to 117      Spine  Lumbar: Flex <50%, Ext <25%, B SB 50% (soreness with all movements)    *Indicates exercise, modality, or manual techniques to be initiated when appropriate    Assessment: Body structures, Functions, Activity limitations: Decreased ROM,Decreased strength,Increased pain,Decreased posture,Decreased functional mobility   Assessment: Mmt indicates improved hip flexion, abduction strength. Pt began side lying hip abduction to target glute med strengthening. Tolerated recumbent bike for hip-knee mobility. Slowly making gains towards LTG's. Treatment Diagnosis: LBP with R knee pain and strength and ROM deficits. Prognosis: Good     Goals:  Short term goals  Time Frame for Short term goals: 2 weeks  Short term goal 1: Decrease lumbar and R knee pain 50% to assist with improved functional gains. Long term goals  Time Frame for Long term goals : 4-6 weeks  Long term goal 1: Indep HEP for symptom management  Long term goal 2: Pt demo improved overall function by reporting greater than 80% per functional survey score  Long term goal 3: Improve B hip/knee  strength 4/5 to 4+/5 to allow patient to perfrom bed mobility with min to no assist.  Long term goal 4: Pain-free lumbar AROM to 50% WNL allowing an increase in ADL tolerance. Long term goal 5: Pain-free R knee AROM to 0-125 deg  WNL allowing an increase in ADL tolerance. Progress toward goals: Pt demonstrates improvement with B knee, hip strength. POST-PAIN       Pain Rating (0-10 pain scale):   4/10   Location and pain description same as pre-treatment unless indicated.    Action: [] NA   [x] Perform HEP  [] Meds as prescribed  [] Modalities as prescribed   [] Call Physician     Frequency/Duration:  Plan  Times per week: 2  Plan weeks: 4-6  Current Treatment Recommendations: Strengthening,ROM,Home Exercise Program,Manual Therapy - Soft Tissue Mobilization,Modalities     Pt to continue current HEP. See objective section for any therapeutic exercise changes, additions or modifications this date.   PT Individual Minutes  Time In: 1127  Time Out: 8294  Minutes: 46  Timed Code Treatment Minutes: 41 Minutes     Timed Activity Minutes Units   Ther Ex 41 3     Signature:  Electronically signed by Richie Vasquez PTA on 1/27/22 at 1:07 PM EST

## 2022-02-01 ENCOUNTER — APPOINTMENT (OUTPATIENT)
Dept: PHYSICAL THERAPY | Age: 77
End: 2022-02-01
Payer: MEDICARE

## 2022-02-03 ENCOUNTER — HOSPITAL ENCOUNTER (OUTPATIENT)
Dept: PHYSICAL THERAPY | Age: 77
Setting detail: THERAPIES SERIES
Discharge: HOME | End: 2022-02-03
Payer: MEDICARE

## 2022-02-04 ASSESSMENT — PAIN DESCRIPTION - PROGRESSION: CLINICAL_PROGRESSION: NOT CHANGED

## 2022-02-04 NOTE — PROGRESS NOTES
Therapy                            Cancellation/No-show Note      Date:  2022  Patient Name:  Malachi Gordon  :  1945   MRN:  [id number]  Referring Practitioner: FRANK Ahumada CNP  Diagnosis: Lumbar spondylosis and R knee arthritis    Visit Information:  PT Visit Information  Onset Date: 20  PT Insurance Information: Medicare/Medical [place]  Total # of Visits Approved:  (BMN)  Total # of Visits to Date: 6  Plan of Care/Certification Expiration Date: 22  No Show: 0  Canceled Appointment: 2  Progress Note Counter:  (next PN due 22) (cx'd on 2/3/22 - weather)    For today's appointment patient:  [x]  Cancelled  []  Rescheduled appointment  []  No-show   []  Called pt to remind of next appointment     Reason given by patient:  []  Patient ill  []  Conflicting appointment  []  No transportation    []  Conflict with work  []  No reason given  [x]  Other: weather      [x] Pt has future appointments scheduled, no follow up needed  [] Pt requests to be on hold.     Reason:   If > 2 weeks please discuss with therapist.  [] Therapist to call pt for follow up     Comments:       Signature: Electronically signed by Cassandra Richards PTA on 22 at 11:03 AM EST

## 2022-02-10 ENCOUNTER — HOSPITAL ENCOUNTER (OUTPATIENT)
Dept: PHYSICAL THERAPY | Age: 77
Setting detail: THERAPIES SERIES
Discharge: HOME | End: 2022-02-10
Payer: MEDICARE

## 2022-02-10 PROCEDURE — 97032 APPL MODALITY 1+ESTIM EA 15: CPT

## 2022-02-10 PROCEDURE — 97140 MANUAL THERAPY 1/> REGIONS: CPT

## 2022-02-10 ASSESSMENT — PAIN DESCRIPTION - ORIENTATION: ORIENTATION: LOWER

## 2022-02-10 ASSESSMENT — PAIN SCALES - GENERAL: PAINLEVEL_OUTOF10: 5

## 2022-02-10 ASSESSMENT — PAIN DESCRIPTION - FREQUENCY: FREQUENCY: CONTINUOUS

## 2022-02-10 ASSESSMENT — PAIN DESCRIPTION - LOCATION: LOCATION: BACK

## 2022-02-10 ASSESSMENT — PAIN DESCRIPTION - ONSET: ONSET: AWAKENED FROM SLEEP

## 2022-02-10 ASSESSMENT — PAIN DESCRIPTION - PROGRESSION: CLINICAL_PROGRESSION: NOT CHANGED

## 2022-02-10 ASSESSMENT — PAIN DESCRIPTION - PAIN TYPE: TYPE: CHRONIC PAIN

## 2022-02-10 NOTE — PROGRESS NOTES
Terra Carlson  [facility], [address]  JTWUP:[phone]        Date: 2/10/2022  Patient: Reymundo Vann  : 1945  ACCT #: [de-identified]  Referring Practitioner: FRANK Schaffer CNP  Diagnosis: Lumbar spondylosis and R knee arthritis  Treatment Diagnosis: LBP with R knee pain and strength and ROM deficits. Visit Information:  PT Visit Information  Onset Date: 20  PT Insurance Information: Medicare/Medical [place]  Total # of Visits Approved:  (BMN)  Total # of Visits to Date: 7  Plan of Care/Certification Expiration Date: 22  No Show: 0  Canceled Appointment: 2  Progress Note Counter:  (next PN due 22)    Subjective: Pt stated that his R knee is bothering him today. His low back is ok at the moment, but he has not done much yet today. He can sit as long as he can, but then to stand up is really painful for him. Comments: xray=R knee moderate to severe medial patellofemoral OA  HEP Compliance:  [x] Good [] Fair [] Poor [] Reports not doing due to:      Pain Assessment  Pain Assessment: 0-10  Pain Level: 5  Pain Type: Chronic pain  Pain Location: Back  Pain Orientation: Lower  Pain Descriptors: Tightness; Tiring;Aching  Pain Frequency: Continuous  Pain Onset: Awakened from sleep  Clinical Progression: Not changed    OBJECTIVE:   Exercises  Exercise 19: objective measures taken for MMT and ROM  Exercise 20: HEP: has previous low back, LE HEP       Strength: [] NT  [] MMT completed:  Strength RLE  Comment: Hip flex 4- to 4/5, hip abd(sidelying) 4-/5, knee flx 3+ to 4-/5, knee ext 4/5  Strength LLE  Comment: Hip flex 4-/5, hip abd(sidelying)4/5, knee flx 4-/5, knee ext 4+/5     ROM: [] NT  [x] ROM measurements:     AROM RLE (degrees)  RLE General AROM: knee -8- to 117      Spine  Lumbar: Flex <50%, Ext <25%, B SB 50% (soreness with all movements)     Manual:   Manual therapy  Other: objective measures taken 23 mins    *Indicates exercise, modality, or manual techniques to be initiated when appropriate    Assessment: Body structures, Functions, Activity limitations: Decreased ROM,Decreased strength,Increased pain,Decreased posture,Decreased functional mobility   Assessment: Objective measures taken this visit for ROM and strength. Pt completed paperwork this visit and updated progress note. At this poin the pt is making minimal progress and not reaching his LTG's. Weather and other appts limited the number of times he made his appts. Pt would benefit for an additonal 4-6 week so of PT to work towards goals  Treatment Diagnosis: LBP with R knee pain and strength and ROM deficits. Prognosis: Good     Goals:  Short term goals  Time Frame for Short term goals: 2 weeks  Short term goal 1: Decrease lumbar and R knee pain 50% to assist with improved functional gains. Long term goals  Time Frame for Long term goals : 4-6 weeks  Long term goal 1: Indep HEP for symptom management  Long term goal 2: Pt demo improved overall function by reporting greater than 80% per functional survey score  Long term goal 3: Improve B hip/knee  strength 4/5 to 4+/5 to allow patient to perfrom bed mobility with min to no assist.  Long term goal 4: Pain-free lumbar AROM to 50% WNL allowing an increase in ADL tolerance. Long term goal 5: Pain-free R knee AROM to 0-125 deg  WNL allowing an increase in ADL tolerance. Progress toward goals:  Progress note completed    POST-PAIN       Pain Rating (0-10 pain scale):  5 /10   Location and pain description same as pre-treatment unless indicated. Action: [] NA   [] Perform HEP  [] Meds as prescribed  [] Modalities as prescribed   [] Call Physician     Frequency/Duration:  Plan  Times per week: 2  Plan weeks: 4-6  Current Treatment Recommendations: Strengthening,ROM,Home Exercise Program,Manual Therapy - Soft Tissue Mobilization,Modalities     Pt to continue current HEP.   See objective section for any therapeutic exercise changes, additions or modifications this date.      PT Individual Minutes  Time In: 6054  Time Out: 1430  Minutes: 45  Timed Code Treatment Minutes: 23 Minutes  Procedure Minutes:n/a     Timed Activity Minutes Units        Manual  23 2       Signature:  Electronically signed by Nav Grady PTA on 2/10/22 at 1:57 PM EST

## 2022-02-10 NOTE — PROGRESS NOTES
[address]  [address]:[phone]    [] Certification  [x] Recertification []  Plan of Care  [x] Progress Note [] Discharge      To:  FRANK Lyman CNP      From:  [facility]  Patient: Rayo Ram     : 1945  Diagnosis: Lumbar spondylosis and R knee arthritis     Date: 2/10/2022  Treatment Diagnosis: LBP with R knee pain and strength and ROM deficits. Plan of Care/Certification Expiration Date: 03/10/22  Progress Report Period from:  2022  to 2/10/2022    Total # of Visits to Date: 7   No Show: 0    Canceled Appointment: 2     OBJECTIVE:   Short Term Goals - Time Frame for Short term goals: 2 weeks    Goals Current/Discharge status  Met   Short term goal 1: Decrease lumbar and R knee pain 50% to assist with improved functional gains. AROM RLE (degrees)  RLE General AROM: knee -8- to 117   [] yes  [x] no     Long Term Goals - Time Frame for Long term goals : 4-6 weeks  Goals Current/ Discharge status Met   Long term goal 1: Indep HEP for symptom management Yes and NO d/t his bedroom being upstairs/and weather [] yes  [x] no   Long term goal 2: Pt demo improved overall function by reporting greater than 80% per functional survey score Exam: Mod Oswestry 20/50=60% functional, LEFS 30/80=38% functional [] yes  [x] no   Long term goal 3: Improve B hip/knee  strength 4/5 to 4+/5 to allow patient to perfrom bed mobility with min to no assist. Strength RLE  Comment: Hip flex 4- to 4/5, hip abd(sidelying) 4-/5, knee flx 3+ to 4-/5, knee ext 4/5  Strength LLE  Comment: Hip flex 4-/5, hip abd(sidelying)4/5, knee flx 4-/5, knee ext 4+/5 [] yes  [x] no   Long term goal 4: Pain-free lumbar AROM to 50% WNL allowing an increase in ADL tolerance.      Spine  Lumbar: Flex <50%, Ext <25%, B SB 50% (soreness with all movements) [] yes  [x] no   Long term goal 5: Pain-free R knee AROM to 0-125 deg  WNL allowing an increase in ADL tolerance. AROM RLE (degrees)  RLE General AROM: knee -8- to 117   [] yes  [x] no      Body structures, Functions, Activity limitations: Decreased ROM,Decreased strength,Increased pain,Decreased posture,Decreased functional mobility   Assessment: At this point the pt is making minimal progress and not reaching his LTG's. Weather and other appts limited the number of times he made his appts. Pt would benefit for an additonal 4-6 week so of PT to work towards goals  Prognosis: Good  Discharge Recommendations: Continue to assess pending progress    PLAN: [x] Progress note   Frequency/Duration:  Plan  Times per week: 2  Plan weeks: 4-6  Current Treatment Recommendations: Strengthening,ROM,Home Exercise Program,Manual Therapy - Soft Tissue Mobilization,Modalities                      Patient Status:[x] Continue/ Initiate plan of Care    [] Discharge PT. Recommend pt continue with HEP. [] Additional visits requested, Please re-certify for additional visits:        Objective measurements provided by:Electronically signed by Hector Brown PTA on 2/10/22 at 2:14 PM EST    Signature: Electronically signed by Erick Tse PT on 2/23/2022 at 3:17 PM      If you have any questions or concerns, please don't hesitate to call. Thank you for your referral.    I have reviewed this plan of care and certify a need for medically necessary rehabilitation services.     Physician Signature:__________________________________________________________  Date:  Please sign and return

## 2022-02-15 ENCOUNTER — HOSPITAL ENCOUNTER (OUTPATIENT)
Dept: PHYSICAL THERAPY | Age: 77
Setting detail: THERAPIES SERIES
Discharge: HOME | End: 2022-02-15
Payer: MEDICARE

## 2022-02-15 PROCEDURE — 97110 THERAPEUTIC EXERCISES: CPT

## 2022-02-15 ASSESSMENT — PAIN DESCRIPTION - LOCATION: LOCATION: BACK

## 2022-02-15 ASSESSMENT — PAIN DESCRIPTION - DESCRIPTORS: DESCRIPTORS: ACHING;TIGHTNESS

## 2022-02-15 ASSESSMENT — PAIN - FUNCTIONAL ASSESSMENT: PAIN_FUNCTIONAL_ASSESSMENT: PREVENTS OR INTERFERES WITH MANY ACTIVE NOT PASSIVE ACTIVITIES

## 2022-02-15 ASSESSMENT — PAIN DESCRIPTION - ONSET: ONSET: AWAKENED FROM SLEEP

## 2022-02-15 ASSESSMENT — PAIN DESCRIPTION - PAIN TYPE: TYPE: CHRONIC PAIN

## 2022-02-15 ASSESSMENT — PAIN DESCRIPTION - PROGRESSION: CLINICAL_PROGRESSION: NOT CHANGED

## 2022-02-15 ASSESSMENT — PAIN DESCRIPTION - FREQUENCY: FREQUENCY: CONTINUOUS

## 2022-02-15 ASSESSMENT — PAIN DESCRIPTION - ORIENTATION: ORIENTATION: LOWER

## 2022-02-15 ASSESSMENT — PAIN SCALES - GENERAL: PAINLEVEL_OUTOF10: 10

## 2022-02-15 NOTE — PROGRESS NOTES
Mary Carlson  [address]  SUQJO:[phone]        Date: 2/15/2022  Patient: Marquise Menchaca  : 1945  ACCT #: [de-identified]  Referring Practitioner: FRANK Horton CNP  Diagnosis: Lumbar spondylosis and R knee arthritis  Treatment Diagnosis: LBP with R knee pain and strength and ROM deficits. Visit Information:  PT Visit Information  Onset Date: 20  PT Insurance Information: Medicare/Medical [place]  Total # of Visits Approved:  (BMN)  Total # of Visits to Date: 8  Plan of Care/Certification Expiration Date: 03/10/22  No Show: 0  Canceled Appointment: 2  Progress Note Counter:  (next PN due 22)    Subjective: Pt states he hurts all over today. Rates pain as high as 10/10 on VAS. No pain with laying or sitting. Comments: xray=R knee moderate to severe medial patellofemoral OA  HEP Compliance:  [] Good [x] Fair [] Poor [] Reports not doing due to:    Vital Signs  Patient Currently in Pain: Yes   Pain Screening  Patient Currently in Pain: Yes  Pain Assessment  Pain Assessment: 0-10  Pain Level: 10 (at worst 10/10)  Pain Type: Chronic pain  Pain Location: Back  Pain Orientation: Lower  Pain Descriptors: Aching;Tightness  Pain Frequency: Continuous  Pain Onset: Awakened from sleep  Clinical Progression: Not changed  Functional Pain Assessment: Prevents or interferes with many active not passive activities    OBJECTIVE:   Exercises  Exercise 1: Bike: (sport) x 3 min (watch timer)  Exercise 2: Ham stretch supine 5 x 20\"  Exercise 3: LTR 10 x 5\"  Exercise 4: SLR 2 x 5, 5 sec  Exercise 5: Bridging 10 x 5\"  Exercise 6: T ball hamstring curl 10 x 5\"  Exercise 8: side lying hip abd x 5, 5 secs  Exercise 9: Knee ext (table) 5#, 10 x 5\"  Exercise 20: HEP: has previous low back, LE HEP    *Indicates exercise, modality, or manual techniques to be initiated when appropriate    Assessment:       Body structures, Functions, Activity limitations: Decreased ROM,Decreased strength,Increased pain,Decreased posture,Decreased functional mobility   Assessment: Pt continued progression of lumbar, LE ther ex. Pt performing SLR, bridging, and LAQ's for improving LE strength. Requires rest breaks at times d/t fatigue. No issues noted after completing listed exercises. Treatment Diagnosis: LBP with R knee pain and strength and ROM deficits. Prognosis: Good     Goals:  Short term goals  Time Frame for Short term goals: 2 weeks  Short term goal 1: Decrease lumbar and R knee pain 50% to assist with improved functional gains. Long term goals  Time Frame for Long term goals : 4-6 weeks  Long term goal 1: Indep HEP for symptom management  Long term goal 2: Pt demo improved overall function by reporting greater than 80% per functional survey score  Long term goal 3: Improve B hip/knee  strength 4/5 to 4+/5 to allow patient to perfrom bed mobility with min to no assist.  Long term goal 4: Pain-free lumbar AROM to 50% WNL allowing an increase in ADL tolerance. Long term goal 5: Pain-free R knee AROM to 0-125 deg  WNL allowing an increase in ADL tolerance. Progress toward goals: pt performing quadriceps strengthening exercises to meet long term goal 3. POST-PAIN       Pain Rating (0-10 pain scale):   5-6/10   Location and pain description same as pre-treatment unless indicated. Action: [x] NA   [x] Perform HEP  [] Meds as prescribed  [] Modalities as prescribed   [] Call Physician     Frequency/Duration:  Plan  Times per week: 2  Plan weeks: 4-6  Current Treatment Recommendations: Strengthening,ROM,Home Exercise Program,Manual Therapy - Soft Tissue Mobilization,Modalities     Pt to continue current HEP. See objective section for any therapeutic exercise changes, additions or modifications this date.     PT Individual Minutes  Time In: 1345  Time Out: 1430  Minutes: 45  Timed Code Treatment Minutes: 45 Minutes     Timed Activity Minutes Units   Ther Ex 45 3 Signature:  Electronically signed by Lizy Restrepo PTA on 2/15/22 at 2:00 PM EST

## 2022-02-17 ENCOUNTER — HOSPITAL ENCOUNTER (OUTPATIENT)
Dept: PHYSICAL THERAPY | Age: 77
Setting detail: THERAPIES SERIES
Discharge: HOME | End: 2022-02-17
Payer: MEDICARE

## 2022-02-17 PROCEDURE — 97110 THERAPEUTIC EXERCISES: CPT

## 2022-02-17 ASSESSMENT — PAIN DESCRIPTION - DESCRIPTORS: DESCRIPTORS: ACHING;TIGHTNESS

## 2022-02-17 ASSESSMENT — PAIN DESCRIPTION - FREQUENCY: FREQUENCY: CONTINUOUS

## 2022-02-17 ASSESSMENT — PAIN SCALES - GENERAL: PAINLEVEL_OUTOF10: 8

## 2022-02-17 ASSESSMENT — PAIN DESCRIPTION - ORIENTATION: ORIENTATION: LOWER

## 2022-02-17 ASSESSMENT — PAIN DESCRIPTION - PROGRESSION: CLINICAL_PROGRESSION: NOT CHANGED

## 2022-02-17 ASSESSMENT — PAIN DESCRIPTION - LOCATION: LOCATION: BACK

## 2022-02-17 ASSESSMENT — PAIN DESCRIPTION - PAIN TYPE: TYPE: CHRONIC PAIN

## 2022-02-17 NOTE — PROGRESS NOTES
Aliza Carlson  [address]  OTOUX:[phone]        Date: 2022  Patient: Brigette Palacios  : 1945  ACCT #: [de-identified]  Referring Practitioner: FRANK Chatterjee CNP  Diagnosis: Lumbar spondylosis and R knee arthritis  Treatment Diagnosis: LBP with R knee pain and strength and ROM deficits. Visit Information:  PT Visit Information  Onset Date: 20  PT Insurance Information: Medicare/Medical [place]  Total # of Visits Approved:  (BMN)  Total # of Visits to Date: 5  Plan of Care/Certification Expiration Date: 03/10/22  No Show: 0  Progress Note Due Date: 03/10/22  Canceled Appointment: 2  Progress Note Counter:   (PN due 3/10/22)    Subjective: Pt reports 8/10 pain across his low back while standing and walking. Zero pain with sitting and resting. Pt has a skin rash on his arms/hands with some bleeding blisters. Pt has already seen the dermatologist and isn't sure what caused it.   Comments: xray=R knee moderate to severe medial patellofemoral OA  HEP Compliance:  [x] Good [] Fair [] Poor [] Reports not doing due to:    Vital Signs  Patient Currently in Pain: Yes   Pain Screening  Patient Currently in Pain: Yes  Pain Assessment  Pain Assessment: 0-10  Pain Level: 8 (standing/walking)  Pain Type: Chronic pain  Pain Location: Back  Pain Orientation: Lower  Pain Descriptors: Aching;Tightness  Pain Frequency: Continuous  Clinical Progression: Not changed    OBJECTIVE:   Exercises  Exercise 1: Bike: (sport) x 3 min (watch timer)  Exercise 2: Ham stretch supine 5 x 20\" - skin rash on hands  Exercise 3: LTR 10 x 5\"  Exercise 4: SLR 2 x 5, 5 sec  Exercise 5: Bridging 10 x 5\"  Exercise 6: T ball hamstring curl 10 x 5\"  Exercise 8: side lying hip abd x 5, 5 secs  Exercise 9: Knee ext (table) 5#, 10 x 5\"  Exercise 20: HEP: has previous low back, LE HEP    Strength: [x] NT  [] MMT completed:    ROM: [x] NT  [] ROM measurements:    *Indicates exercise, modality, or manual techniques to be initiated when appropriate    Assessment: Body structures, Functions, Activity limitations: Decreased ROM,Decreased strength,Increased pain,Decreased posture,Decreased functional mobility   Assessment: Pt continues to be challenged with lumbar/LE therex. Requires occasional rest breaks as he fatigues easily. Treatment Diagnosis: LBP with R knee pain and strength and ROM deficits. Prognosis: Good    Goals:  Short term goals  Time Frame for Short term goals: 2 weeks  Short term goal 1: Decrease lumbar and R knee pain 50% to assist with improved functional gains. Long term goals  Time Frame for Long term goals : 4-6 weeks  Long term goal 1: Indep HEP for symptom management  Long term goal 2: Pt demo improved overall function by reporting greater than 80% per functional survey score  Long term goal 3: Improve B hip/knee  strength 4/5 to 4+/5 to allow patient to perfrom bed mobility with min to no assist.  Long term goal 4: Pain-free lumbar AROM to 50% WNL allowing an increase in ADL tolerance. Long term goal 5: Pain-free R knee AROM to 0-125 deg  WNL allowing an increase in ADL tolerance. Progress toward goals: ongoing, working toward LTG 3     POST-PAIN       Pain Rating (0-10 pain scale):   5/10   Location and pain description same as pre-treatment unless indicated. Action: [] NA   [] Perform HEP  [] Meds as prescribed  [] Modalities as prescribed   [] Call Physician     Frequency/Duration:  Plan  Times per week: 2  Plan weeks: 4-6  Current Treatment Recommendations: Strengthening,ROM,Home Exercise Program,Manual Therapy - Soft Tissue Mobilization,Modalities    Pt to continue current HEP. See objective section for any therapeutic exercise changes, additions or modifications this date.     PT Individual Minutes  Time In: 1300  Time Out: 5040  Minutes: 49  Timed Code Treatment Minutes: 47 Minutes     Timed Activity Minutes Units   Ther Ex 47 3       Signature: Electronically signed by Angélica Mcneil PTA on 2/17/22 at 1:34 PM EST

## 2022-02-21 DIAGNOSIS — G57.11 MERALGIA PARAESTHETICA, RIGHT: ICD-10-CM

## 2022-02-22 ENCOUNTER — HOSPITAL ENCOUNTER (OUTPATIENT)
Dept: PHYSICAL THERAPY | Age: 77
Setting detail: THERAPIES SERIES
Discharge: HOME | End: 2022-02-22
Payer: MEDICARE

## 2022-02-22 PROCEDURE — 97110 THERAPEUTIC EXERCISES: CPT

## 2022-02-22 RX ORDER — GABAPENTIN 300 MG/1
CAPSULE ORAL
Qty: 90 CAPSULE | Refills: 3 | OUTPATIENT
Start: 2022-02-22

## 2022-02-22 ASSESSMENT — PAIN DESCRIPTION - ONSET: ONSET: AWAKENED FROM SLEEP

## 2022-02-22 ASSESSMENT — PAIN DESCRIPTION - FREQUENCY: FREQUENCY: INTERMITTENT

## 2022-02-22 ASSESSMENT — PAIN DESCRIPTION - PROGRESSION: CLINICAL_PROGRESSION: NOT CHANGED

## 2022-02-22 ASSESSMENT — PAIN DESCRIPTION - ORIENTATION: ORIENTATION: LOWER

## 2022-02-22 ASSESSMENT — PAIN SCALES - GENERAL: PAINLEVEL_OUTOF10: 8

## 2022-02-22 ASSESSMENT — PAIN DESCRIPTION - PAIN TYPE: TYPE: CHRONIC PAIN

## 2022-02-22 ASSESSMENT — PAIN DESCRIPTION - LOCATION: LOCATION: BACK;FOOT

## 2022-02-22 ASSESSMENT — PAIN DESCRIPTION - DESCRIPTORS: DESCRIPTORS: TIGHTNESS;TENDER

## 2022-02-22 NOTE — PROGRESS NOTES
Amy Carlson  [address]  HDNQB:[phone]        Date: 2022  Patient: Kyler Keyes  : 1945  ACCT #: [de-identified]  Referring Practitioner: FRANK Waller CNP  Diagnosis: Lumbar spondylosis and R knee arthritis  Treatment Diagnosis: LBP with R knee pain and strength and ROM deficits. Visit Information:  PT Visit Information  Onset Date: 20  PT Insurance Information: Medicare/Medical [place]  Total # of Visits Approved:  (BMN)  Total # of Visits to Date: 10  Plan of Care/Certification Expiration Date: 03/10/22  No Show: 0  Canceled Appointment: 2  Progress Note Counter: 3/8  (PN due 3/10/22)    Subjective: Pt stated that yesterday he had a bad day. It was really painful to stand and his legs really hurt. Then stated would change his posture and place all of his weigt towards the right d/t left leg pain. Today the R leg is painful. Just havnig a hard time managing his pain levels. Comments: xray=R knee moderate to severe medial patellofemoral OA  HEP Compliance:  [] Good [x] Fair [] Poor [] Reports not doing due to:    Vital Signs  Patient Currently in Pain: Yes   Pain Screening  Patient Currently in Pain: Yes  Pain Assessment  Pain Level: 8  Pain Type: Chronic pain  Pain Location: Back; Foot  Pain Orientation: Lower  Pain Radiating Towards: Low and R LE/back  Pain Descriptors: Tightness; Tender  Pain Frequency: Intermittent  Pain Onset: Awakened from sleep  Clinical Progression: Not changed    OBJECTIVE:   Exercises  Exercise 1: Bike: (sport) x 3 min wall clock  Exercise 2: Ham stretch supine 5 x 20\" - skin rash on hands HOLD  Exercise 3: LTR 10 x 5\"  Exercise 4: SLR 2 x 5, 5 sec  Exercise 5: Bridging 10 x 5\"  Exercise 6: T ball hamstring curl 10 x 5\"  Exercise 8: side lying hip abd x 10, 5 secs  Exercise 9: Knee ext (table) 5#, 10 x 5\"  Exercise 20: HEP: has previous low back, LE HEP    Strength: [x] NT  [] MMT completed: ROM: [x] NT  [] ROM measurements:      *Indicates exercise, modality, or manual techniques to be initiated when appropriate    Assessment: Body structures, Functions, Activity limitations: Decreased ROM,Decreased strength,Increased pain,Decreased posture,Decreased functional mobility   Assessment: Pt was ble to continu with LE ROM and strength. Pt used frequent reas breaks d/t fatiuge and pain. Pt tolearated approx 3 mins on stationary bike to hlep improve rom and LE stregnth. Treatment Diagnosis: LBP with R knee pain and strength and ROM deficits. Prognosis: Good     Goals:  Short term goals  Time Frame for Short term goals: 2 weeks  Short term goal 1: Decrease lumbar and R knee pain 50% to assist with improved functional gains. Long term goals  Time Frame for Long term goals : 4-6 weeks  Long term goal 1: Indep HEP for symptom management  Long term goal 2: Pt demo improved overall function by reporting greater than 80% per functional survey score  Long term goal 3: Improve B hip/knee  strength 4/5 to 4+/5 to allow patient to perfrom bed mobility with min to no assist.  Long term goal 4: Pain-free lumbar AROM to 50% WNL allowing an increase in ADL tolerance. Long term goal 5: Pain-free R knee AROM to 0-125 deg  WNL allowing an increase in ADL tolerance. Progress toward goals: Continued to work on LE strength working towards  LTG #3. POST-PAIN       Pain Rating (0-10 pain scale):   6/10   Location and pain description same as pre-treatment unless indicated. Action: [] NA   [x] Perform HEP  [x] Meds as prescribed  [] Modalities as prescribed   [] Call Physician     Frequency/Duration:   Plan  Times per week: 2  Plan weeks: 4-6  Current Treatment Recommendations: Strengthening,ROM,Home Exercise Program,Manual Therapy - Soft Tissue Mobilization,Modalities     Pt to continue current HEP. See objective section for any therapeutic exercise changes, additions or modifications this date.     PT Individual Minutes  Time In: 6759  Time Out: 1430  Minutes: 43  Timed Code Treatment Minutes: 42 Minutes  Procedure Minutes:n/a     Timed Activity Minutes Units   Ther Ex 42 3       Signature:  Electronically signed by Shaila Aparicio PTA on 2/22/22 at 2:10 PM EST Yes

## 2022-02-24 ENCOUNTER — HOSPITAL ENCOUNTER (OUTPATIENT)
Dept: PHYSICAL THERAPY | Age: 77
Setting detail: THERAPIES SERIES
Discharge: HOME | End: 2022-02-24
Payer: MEDICARE

## 2022-02-24 ENCOUNTER — OFFICE VISIT (OUTPATIENT)
Dept: PAIN MANAGEMENT | Age: 77
End: 2022-02-24
Payer: MEDICARE

## 2022-02-24 VITALS — HEIGHT: 68 IN | WEIGHT: 263 LBS | BODY MASS INDEX: 39.86 KG/M2 | TEMPERATURE: 96.4 F

## 2022-02-24 DIAGNOSIS — M17.11 ARTHRITIS OF KNEE, RIGHT: ICD-10-CM

## 2022-02-24 DIAGNOSIS — M47.816 SPONDYLOSIS OF LUMBAR REGION WITHOUT MYELOPATHY OR RADICULOPATHY: Primary | ICD-10-CM

## 2022-02-24 DIAGNOSIS — M17.11 PRIMARY OSTEOARTHRITIS OF RIGHT KNEE: ICD-10-CM

## 2022-02-24 DIAGNOSIS — G89.4 CHRONIC PAIN SYNDROME: ICD-10-CM

## 2022-02-24 PROCEDURE — 97110 THERAPEUTIC EXERCISES: CPT

## 2022-02-24 PROCEDURE — 4040F PNEUMOC VAC/ADMIN/RCVD: CPT | Performed by: NURSE PRACTITIONER

## 2022-02-24 PROCEDURE — 1036F TOBACCO NON-USER: CPT | Performed by: NURSE PRACTITIONER

## 2022-02-24 PROCEDURE — G8484 FLU IMMUNIZE NO ADMIN: HCPCS | Performed by: NURSE PRACTITIONER

## 2022-02-24 PROCEDURE — 99213 OFFICE O/P EST LOW 20 MIN: CPT | Performed by: NURSE PRACTITIONER

## 2022-02-24 PROCEDURE — 1123F ACP DISCUSS/DSCN MKR DOCD: CPT | Performed by: NURSE PRACTITIONER

## 2022-02-24 PROCEDURE — G8417 CALC BMI ABV UP PARAM F/U: HCPCS | Performed by: NURSE PRACTITIONER

## 2022-02-24 PROCEDURE — G8427 DOCREV CUR MEDS BY ELIG CLIN: HCPCS | Performed by: NURSE PRACTITIONER

## 2022-02-24 RX ORDER — OXYCODONE HYDROCHLORIDE AND ACETAMINOPHEN 5; 325 MG/1; MG/1
1 TABLET ORAL 2 TIMES DAILY PRN
Qty: 60 TABLET | Refills: 0 | Status: SHIPPED | OUTPATIENT
Start: 2022-02-27 | End: 2022-03-24 | Stop reason: SDUPTHER

## 2022-02-24 ASSESSMENT — ENCOUNTER SYMPTOMS
BACK PAIN: 1
BLOOD IN STOOL: 0
SHORTNESS OF BREATH: 0

## 2022-02-24 ASSESSMENT — PAIN DESCRIPTION - FREQUENCY: FREQUENCY: INTERMITTENT

## 2022-02-24 ASSESSMENT — PAIN DESCRIPTION - DESCRIPTORS: DESCRIPTORS: TIGHTNESS

## 2022-02-24 ASSESSMENT — PAIN DESCRIPTION - LOCATION: LOCATION: BACK;FOOT

## 2022-02-24 ASSESSMENT — PAIN DESCRIPTION - ORIENTATION: ORIENTATION: LOWER

## 2022-02-24 ASSESSMENT — PAIN DESCRIPTION - PROGRESSION: CLINICAL_PROGRESSION: NOT CHANGED

## 2022-02-24 ASSESSMENT — PAIN DESCRIPTION - PAIN TYPE: TYPE: CHRONIC PAIN

## 2022-02-24 ASSESSMENT — PAIN DESCRIPTION - ONSET: ONSET: AWAKENED FROM SLEEP

## 2022-02-24 ASSESSMENT — PAIN - FUNCTIONAL ASSESSMENT: PAIN_FUNCTIONAL_ASSESSMENT: PREVENTS OR INTERFERES WITH MANY ACTIVE NOT PASSIVE ACTIVITIES

## 2022-02-24 NOTE — PROGRESS NOTES
Otf Guido  (1945)    2022    Subjective:     Otf Guido is 68 y.o. male who complains today of:    Chief Complaint   Patient presents with    Back Pain    Neck Pain    Knee Pain         Allergies:  Caffeine, Codeine, Penicillins, and Shellfish-derived products    Past Medical History:   Diagnosis Date    Chicken pox     Chronic back pain     Congenital heart disease     Mumps     Osteoarthritis     Rheumatic fever      Past Surgical History:   Procedure Laterality Date    CARDIAC SURGERY  03/10/2003, 2005    ALSO STENTS WERE PLACED. Family History   Problem Relation Age of Onset    High Blood Pressure Mother     Stroke Mother     High Blood Pressure Father      Social History     Socioeconomic History    Marital status:      Spouse name: Not on file    Number of children: Not on file    Years of education: Not on file    Highest education level: Not on file   Occupational History    Not on file   Tobacco Use    Smoking status: Former Smoker     Packs/day: 0.50     Years: 45.00     Pack years: 22.50     Types: Cigarettes     Quit date: 3/1/2005     Years since quittin.9    Smokeless tobacco: Never Used   Substance and Sexual Activity    Alcohol use: No     Alcohol/week: 0.0 standard drinks     Comment: RARELY    Drug use: Not on file    Sexual activity: Not on file   Other Topics Concern    Not on file   Social History Narrative    Not on file     Social Determinants of Health     Financial Resource Strain:     Difficulty of Paying Living Expenses: Not on file   Food Insecurity:     Worried About [address] in the Last Year: Not on file    Chery of Food in the Last Year: Not on file   Transportation Needs:     Lack of Transportation (Medical): Not on file    Lack of Transportation (Non-Medical):  Not on file   Physical Activity:     Days of Exercise per Week: Not on file    Minutes of Exercise per Session: Not on file Stress:     Feeling of Stress : Not on file   Social Connections:     Frequency of Communication with Friends and Family: Not on file    Frequency of Social Gatherings with Friends and Family: Not on file    Attends Spiritism Services: Not on file    Active Member of [address] [place] [address] or Organizations: Not on file    Attends Club or Organization Meetings: Not on file    Marital Status: Not on file   Intimate Partner Violence:     Fear of Current or Ex-Partner: Not on file    Emotionally Abused: Not on file    Physically Abused: Not on file    Sexually Abused: Not on file   Housing Stability:     Unable to Pay for Housing in the Last Year: Not on file    Number of [place] in the Last Year: Not on file    Unstable Housing in the Last Year: Not on file       Current Outpatient Medications on File Prior to Visit   Medication Sig Dispense Refill    gabapentin (NEURONTIN) 300 MG capsule Take 1 capsule by mouth nightly for 90 days.  90 capsule 0    Clopidogrel Bisulfate (PLAVIX PO) Take by mouth      Handicap Placard MISC by Does not apply route Valid 9/1/19- 9/1/23 1 each 0    allopurinol (ZYLOPRIM) 100 MG tablet Take 200 mg by mouth daily      aspirin 81 MG tablet Take 81 mg by mouth daily      Omega-3 Fatty Acids (FISH OIL) 1000 MG CPDR Take 2 tablets by mouth 2 times daily      NONFORMULARY as needed Indications: METALAZONE 25MG  1 TAB PRN      omeprazole (PRILOSEC) 20 MG capsule Take 20 mg by mouth daily      ezetimibe-simvastatin (VYTORIN) 10-20 MG per tablet Take 1 tablet by mouth nightly      carvedilol (COREG) 25 MG tablet       ONE TOUCH ULTRA TEST strip   0    ONETOUCH DELICA LANCETS 00T MISC   0    metFORMIN (GLUCOPHAGE) 1000 MG tablet       montelukast (SINGULAIR) 10 MG tablet       NITROSTAT 0.4 MG SL tablet   0    potassium chloride SA (K-DUR;KLOR-CON M) 20 MEQ tablet       spironolactone (ALDACTONE) 25 MG tablet   0    torsemide (DEMADEX) 100 MG tablet        No current facility-administered medications on file prior to visit. Pt presents today for a f/u of chronic low back and right knee pain. Had pacemaker/ICD three months ago. He has been going to therapy twice weekly which is going well. Pt feels pain level and functioning improves with prescribed medications and is able to walk without cane. Pt feels that walking & cold weather aggravates the pain. Pt denies radiating numbness and tingling. Gel injections with Dr. Xiao Sim in past.  He says he did see ortho. Cannot have MRI. He says has knee brace and cane on occasion. had cardiac valve repair in July 2020. He has tried Rt SI joint and facet joint injections in the past with relief at that time. 8/9/21 R knee inj  4/5/21 BL gel one knees        Review of Systems   Constitutional: Negative for appetite change and fever. HENT: Negative for hearing loss. Eyes: Negative for visual disturbance. Respiratory: Negative for shortness of breath. Gastrointestinal: Negative for blood in stool. Genitourinary: Negative for difficulty urinating and hematuria. Musculoskeletal: Positive for arthralgias and back pain. Skin: Negative for rash. Neurological: Negative for facial asymmetry. Hematological: Negative for adenopathy. Psychiatric/Behavioral: Negative for self-injury. All other systems reviewed and are negative. Objective:     Vitals:  Temp 96.4 °F (35.8 °C)   Ht 5' 8\" (1.727 m)   Wt 263 lb (119.3 kg)   BMI 39.99 kg/m² Pain Score:   6      Physical Exam  Vitals and nursing note reviewed. Pt is alert and oriented x 3.  Recent and remote memory is intact.  Mood, judgement and affect are normal.  No signs of distress or SOB noted.  Visualized skin intact.  Sensation intact to light touch. Decreased ROM with flexion and extension of low back.  Tender with palpation to bilateral lumbar spine with positive provacative maneuvers noted.  Negative SLR.    Pt is unable to briefly heel walk and toe walk.   Strength, balance, and coordination are diminished but functional for ambulation.  Left knee with decreased ROM.  Tenderness noted with palpation. Mild swelling noted with arthritic deformity noted.  Crepitus with ROM.  Gait antalgic.  Uses cane. Assessment:      Diagnosis Orders   1. Spondylosis of lumbar region without myelopathy or radiculopathy  oxyCODONE-acetaminophen (PERCOCET) 5-325 MG per tablet   2. Arthritis of knee, right  oxyCODONE-acetaminophen (PERCOCET) 5-325 MG per tablet   3. Chronic pain syndrome  oxyCODONE-acetaminophen (PERCOCET) 5-325 MG per tablet   4. Primary osteoarthritis of right knee  oxyCODONE-acetaminophen (PERCOCET) 5-325 MG per tablet       Plan:     Periodic Controlled Substance Monitoring: Possible medication side effects, risk of tolerance/dependence & alternative treatments discussed. ,No signs of potential drug abuse or diversion identified. ,Assessed functional status. ,Obtaining appropriate analgesic effect of treatment. (Judy Yip, APRN - CNP)    Orders Placed This Encounter   Medications    oxyCODONE-acetaminophen (PERCOCET) 5-325 MG per tablet     Sig: Take 1 tablet by mouth 2 times daily as needed for Pain for up to 30 days. Dispense:  60 tablet     Refill:  0     Reduce doses taken as pain becomes manageable       No orders of the defined types were placed in this encounter. Discussed options with the patient today. Continue Percocet and Gabapentin (90 day RX).   Continue PT, approved for another 2 weeks. All questions were answered.  Pt verbalized understanding and agrees with above plan.  Utox reviewed and appropriate from 6/24/21. Narcan declined 4/29/21.     Will continue medications for chronic pain as they help pt function with ADL and improve quality of life.  Discussed possible risks of opiate medication with pt, including but not limited to, constipation, nausea or vomiting, sedation, urinary retention, dependence and possible addiction. Pt agrees to use medication as directed. Pt advised to not use opiates while driving or operating heavy equipment, or in situations where pt may harm him/herself or others.  Pt is aware that while on narcotics, pt needs to be seen monthly to reassess pain and need for continued medication.  NDP reviewed. Jean Hayward was reviewed.  This NP saw pt under direct supervision of Dr. Ashli Taylor    Follow up:  Return in about 4 weeks (around 3/24/2022) for review meds and reassess pain.     Rafael Pierre, APRN - CNP

## 2022-02-24 NOTE — PROGRESS NOTES
Chiqui Carlson  [address]  [phone]        Date: 2022  Patient: Tin Devine  : 1945  ACCT #: [de-identified]  Referring Practitioner: FRANK Arias CNP  Diagnosis: Lumbar spondylosis and R knee arthritis  Treatment Diagnosis: LBP with R knee pain and strength and ROM deficits. Visit Information:  PT Visit Information  Onset Date: 20  PT Insurance Information: Medicare/Medical [place]  Total # of Visits Approved:  (BMN)  Total # of Visits to Date: 6  Plan of Care/Certification Expiration Date: 03/10/22  No Show: 0  Canceled Appointment: 2  Progress Note Counter:   (PN due 3/10/22)    Subjective: Pt states he bent over in his chair just to  a battery off of the floor and that is all it took to flare up low back(10/10 pain). Low back is still painful today but not as bad. Comments: xray=R knee moderate to severe medial patellofemoral OA  HEP Compliance:  [] Good [x] Fair [] Poor [] Reports not doing due to:    Vital Signs  Patient Currently in Pain: Yes   Pain Screening  Patient Currently in Pain: Yes  Pain Assessment  Pain Assessment: 0-10  Patient's Stated Pain Goal: 6  Pain Type: Chronic pain  Pain Location: Back; Foot  Pain Orientation: Lower  Pain Descriptors: Tightness  Pain Frequency: Intermittent  Pain Onset: Awakened from sleep  Clinical Progression: Not changed  Functional Pain Assessment: Prevents or interferes with many active not passive activities    OBJECTIVE:   Exercises  Exercise 1: Bike: (sport) x 2.5 min wall clock  Exercise 2: Ham stretch supine 5 x 20\" - skin rash on hands HOLD  Exercise 3: LTR 10 x 5\"  Exercise 4: SLR 2 x 5, 5 sec  Exercise 5: Bridging 10 x 5\"  Exercise 6: T ball hamstring curl 10 x 5\"  Exercise 8: side lying hip abd x 10, 5 secs  Exercise 9: Knee ext (table) 5#, 10 x 5\"  Exercise 20: HEP: has previous low back, LE HEP    *Indicates exercise, modality, or manual techniques to be initiated when appropriate    Assessment: Body structures, Functions, Activity limitations: Decreased ROM,Decreased strength,Increased pain,Decreased posture,Decreased functional mobility   Assessment: Pt requires rest breaks d/t SOB today. Continued with supine quadriceps strengthening exercises performing SLR and bridging. Tolerated RB for improving knee mobility. .  Treatment Diagnosis: LBP with R knee pain and strength and ROM deficits. Prognosis: Good       Goals:  Short term goals  Time Frame for Short term goals: 2 weeks  Short term goal 1: Decrease lumbar and R knee pain 50% to assist with improved functional gains. Long term goals  Time Frame for Long term goals : 4-6 weeks  Long term goal 1: Indep HEP for symptom management  Long term goal 2: Pt demo improved overall function by reporting greater than 80% per functional survey score  Long term goal 3: Improve B hip/knee  strength 4/5 to 4+/5 to allow patient to perfrom bed mobility with min to no assist.  Long term goal 4: Pain-free lumbar AROM to 50% WNL allowing an increase in ADL tolerance. Long term goal 5: Pain-free R knee AROM to 0-125 deg  WNL allowing an increase in ADL tolerance. Progress toward goals:goals are ongoing. POST-PAIN       Pain Rating (0-10 pain scale):   4/10   Location and pain description same as pre-treatment unless indicated. Action: [] NA   [x] Perform HEP  [] Meds as prescribed  [] Modalities as prescribed   [] Call Physician     Frequency/Duration:  Plan  Times per week: 2  Plan weeks: 4-6  Current Treatment Recommendations: Strengthening,ROM,Home Exercise Program,Manual Therapy - Soft Tissue Mobilization,Modalities     Pt to continue current HEP. See objective section for any therapeutic exercise changes, additions or modifications this date.     PT Individual Minutes  Time In: 8959  Time Out: 1403  Minutes: 39  Timed Code Treatment Minutes: 39 Minutes     Timed Activity Minutes Units   Ther Ex 39 3 Detail Level: Detailed

## 2022-03-01 ENCOUNTER — HOSPITAL ENCOUNTER (OUTPATIENT)
Dept: PHYSICAL THERAPY | Age: 77
Setting detail: THERAPIES SERIES
Discharge: HOME | End: 2022-03-01
Payer: MEDICARE

## 2022-03-01 NOTE — PROGRESS NOTES
Therapy                            Cancellation/No-show Note      Date:  3/1/2022  Patient Name:  Magda Vizcarra  :  1945   MRN:  [id number]  Referring Practitioner: FRANK Neves CNP  Diagnosis: Lumbar spondylosis and R knee arthritis    Visit Information:  PT Visit Information  Onset Date: 20  PT Insurance Information: Medicare/Medical [place]  Total # of Visits Approved:  (BMN)  Total # of Visits to Date: 11  Plan of Care/Certification Expiration Date: 03/10/22  No Show: 0  Canceled Appointment: 3  Progress Note Counter:   (PN due 3/10/22)(cancellation 3/1/2022)    For today's appointment patient:  [x]  Cancelled  []  Rescheduled appointment  []  No-show   []  Called pt to remind of next appointment     Reason given by patient:  []  Patient ill  [x]  Conflicting appointment  []  No transportation    []  Conflict with work  []  No reason given  []  Other:      [x] Pt has future appointments scheduled, no follow up needed  [] Pt requests to be on hold.     Reason:   If > 2 weeks please discuss with therapist.  [] Therapist to call pt for follow up     Comments:       Signature: Electronically signed by Mansi Keane PTA on 3/1/22 at 1:09 PM EST

## 2022-03-03 ENCOUNTER — HOSPITAL ENCOUNTER (OUTPATIENT)
Dept: PHYSICAL THERAPY | Age: 77
Setting detail: THERAPIES SERIES
Discharge: HOME | End: 2022-03-03
Payer: MEDICARE

## 2022-03-03 PROCEDURE — 97110 THERAPEUTIC EXERCISES: CPT

## 2022-03-03 ASSESSMENT — PAIN DESCRIPTION - ORIENTATION: ORIENTATION: LOWER

## 2022-03-03 ASSESSMENT — PAIN SCALES - GENERAL: PAINLEVEL_OUTOF10: 6

## 2022-03-03 ASSESSMENT — PAIN DESCRIPTION - PROGRESSION: CLINICAL_PROGRESSION: NOT CHANGED

## 2022-03-03 ASSESSMENT — PAIN DESCRIPTION - LOCATION: LOCATION: BACK

## 2022-03-03 ASSESSMENT — PAIN - FUNCTIONAL ASSESSMENT: PAIN_FUNCTIONAL_ASSESSMENT: PREVENTS OR INTERFERES WITH MANY ACTIVE NOT PASSIVE ACTIVITIES

## 2022-03-03 ASSESSMENT — PAIN DESCRIPTION - FREQUENCY: FREQUENCY: CONTINUOUS

## 2022-03-03 ASSESSMENT — PAIN DESCRIPTION - PAIN TYPE: TYPE: CHRONIC PAIN

## 2022-03-03 ASSESSMENT — PAIN DESCRIPTION - ONSET: ONSET: AWAKENED FROM SLEEP

## 2022-03-03 ASSESSMENT — PAIN DESCRIPTION - DESCRIPTORS: DESCRIPTORS: SORE

## 2022-03-03 NOTE — PROGRESS NOTES
Kylie Carlson  [address]  FEUUQ:[phone]        Date: 3/3/2022  Patient: Raymond Arguello  : 1945  ACCT #: [de-identified]  Referring Practitioner: FRANK Bowens CNP  Diagnosis: Lumbar spondylosis and R knee arthritis  Treatment Diagnosis: LBP with R knee pain and strength and ROM deficits. Visit Information:  PT Visit Information  Onset Date: 20  PT Insurance Information: Medicare/Medical [place]  Total # of Visits Approved:  (BMN)  Total # of Visits to Date: 15  Plan of Care/Certification Expiration Date: 03/10/22  No Show: 0  Canceled Appointment: 3  Progress Note Counter:   (PN due 3/10/22)    Subjective: pt 10 mins late to appointment. States standing at a  plus the cold weather affected low back. The past couple of days pain was up to 10/10 on VAS. Could not make it into PT the other day d/t pain. Doing better today, but still painful.   Comments: xray=R knee moderate to severe medial patellofemoral OA  HEP Compliance:  [] Good [] Fair [] Poor [] Reports not doing due to:    Vital Signs  Patient Currently in Pain: Yes   Pain Screening  Patient Currently in Pain: Yes  Pain Assessment  Pain Assessment: 0-10  Pain Level: 6  Pain Type: Chronic pain  Pain Location: Back  Pain Orientation: Lower  Pain Descriptors: Sore  Pain Frequency: Continuous  Pain Onset: Awakened from sleep  Clinical Progression: Not changed  Functional Pain Assessment: Prevents or interferes with many active not passive activities    OBJECTIVE:   Exercises  Exercise 1: Bike: (sport) x  min wall clock(did not complete, time constraint)  Exercise 2: Ham stretch supine 5 x 20\" - skin rash on hands HOLD  Exercise 3: LTR 10 x 5\"  Exercise 4: SLR 2 x 5, 5 sec  Exercise 5: Bridging 10 x 5\"  Exercise 6: T ball hamstring curl 10 x 5\"  Exercise 8: side lying hip abd x (held, time constraint)  Exercise 9: Knee ext (table) 5#, 10 x 5\"  Exercise 20: HEP: has previous low back, LE HEP    Strength: [] NT  [x] MMT completed:  Strength RLE  Comment: Hip flex 4/5, hip abd(sidelying) 4-/5, knee flx 4-/5, knee ext 4+/5  Strength LLE  Comment: Hip flex 4-/5, hip abd(sidelying)4/5, knee flx 4-/5, knee ext 4+/5     ROM: [] NT  [x] ROM measurements:     AROM RLE (degrees)  RLE General AROM: knee -8- to 112         *Indicates exercise, modality, or manual techniques to be initiated when appropriate    Assessment: Body structures, Functions, Activity limitations: Decreased ROM,Decreased strength,Increased pain,Decreased posture,Decreased functional mobility   Assessment: Time constraint with PT. Pt tolerated supine low back stretches and LE strengthening exercises. There is slight improvement with R hip flexor strength when compared to previous mmt. Increased LB Pain limitating basic ADL's and functional tasks. Treatment Diagnosis: LBP with R knee pain and strength and ROM deficits. Prognosis: Good     Goals:  Short term goals  Time Frame for Short term goals: 2 weeks  Short term goal 1: Decrease lumbar and R knee pain 50% to assist with improved functional gains. Long term goals  Time Frame for Long term goals : 4-6 weeks  Long term goal 1: Indep HEP for symptom management  Long term goal 2: Pt demo improved overall function by reporting greater than 80% per functional survey score  Long term goal 3: Improve B hip/knee  strength 4/5 to 4+/5 to allow patient to perfrom bed mobility with min to no assist.  Long term goal 4: Pain-free lumbar AROM to 50% WNL allowing an increase in ADL tolerance. Long term goal 5: Pain-free R knee AROM to 0-125 deg  WNL allowing an increase in ADL tolerance. Progress toward goals: Pt continued with gentle low back stretches to meet long term goal 4. POST-PAIN       Pain Rating (0-10 pain scale):   6/10   Location and pain description same as pre-treatment unless indicated.    Action: [] NA   [x] Perform HEP  [] Meds as prescribed  [] Modalities as

## 2022-03-10 ENCOUNTER — HOSPITAL ENCOUNTER (OUTPATIENT)
Dept: PHYSICAL THERAPY | Age: 77
Setting detail: THERAPIES SERIES
Discharge: HOME | End: 2022-03-10
Payer: MEDICARE

## 2022-03-15 ENCOUNTER — HOSPITAL ENCOUNTER (OUTPATIENT)
Dept: PHYSICAL THERAPY | Age: 77
Setting detail: THERAPIES SERIES
Discharge: HOME | End: 2022-03-15
Payer: MEDICARE

## 2022-03-15 ENCOUNTER — APPOINTMENT (OUTPATIENT)
Dept: PHYSICAL THERAPY | Age: 77
End: 2022-03-15
Payer: MEDICARE

## 2022-03-15 NOTE — PROGRESS NOTES
Therapy                            Cancellation/No-show Note      Date:  3/15/2022  Patient Name:  Maria Dolores Stanton  :  1945   MRN:  [id number]  Referring Practitioner: FRANK Mcallister CNP  Diagnosis: Lumbar spondylosis and R knee arthritis    Visit Information:  PT Visit Information  Onset Date: 20  PT Insurance Information: Medicare/Medical [place]  Total # of Visits Approved:  (BMN)  Total # of Visits to Date: 12  Plan of Care/Certification Expiration Date: 03/10/22  No Show: 0  Canceled Appointment: 5  Progress Note Counter:  (PN due 3/10/22)    (cx'd on 3/15/2022- cough)    For today's appointment patient:  [x]  Cancelled  []  Rescheduled appointment  []  No-show   []  Called pt to remind of next appointment     Reason given by patient:  [x]  Patient ill  []  Conflicting appointment  []  No transportation    []  Conflict with work  []  No reason given  []  Other: Has a cough, pt stated he will call to schedule when feeling better       [] Pt has future appointments scheduled, no follow up needed  [] Pt requests to be on hold.     Reason:   If > 2 weeks please discuss with therapist.  [] Therapist to call pt for follow up     Comments: Has a cough, pt stated he will call to schedule when feeling better       Signature: Electronically signed by Maddei Stern PTA on 3/15/22 at 11:32 AM EDT

## 2022-03-17 ENCOUNTER — APPOINTMENT (OUTPATIENT)
Dept: PHYSICAL THERAPY | Age: 77
End: 2022-03-17
Payer: MEDICARE

## 2022-03-24 ENCOUNTER — OFFICE VISIT (OUTPATIENT)
Dept: PAIN MANAGEMENT | Age: 77
End: 2022-03-24
Payer: MEDICARE

## 2022-03-24 VITALS — BODY MASS INDEX: 39.4 KG/M2 | WEIGHT: 260 LBS | TEMPERATURE: 97.8 F | HEIGHT: 68 IN

## 2022-03-24 DIAGNOSIS — M17.11 PRIMARY OSTEOARTHRITIS OF RIGHT KNEE: ICD-10-CM

## 2022-03-24 DIAGNOSIS — M47.816 SPONDYLOSIS OF LUMBAR REGION WITHOUT MYELOPATHY OR RADICULOPATHY: Primary | ICD-10-CM

## 2022-03-24 DIAGNOSIS — M17.11 ARTHRITIS OF KNEE, RIGHT: ICD-10-CM

## 2022-03-24 DIAGNOSIS — G57.11 MERALGIA PARAESTHETICA, RIGHT: ICD-10-CM

## 2022-03-24 DIAGNOSIS — G89.4 CHRONIC PAIN SYNDROME: ICD-10-CM

## 2022-03-24 PROCEDURE — 1036F TOBACCO NON-USER: CPT | Performed by: NURSE PRACTITIONER

## 2022-03-24 PROCEDURE — 1123F ACP DISCUSS/DSCN MKR DOCD: CPT | Performed by: NURSE PRACTITIONER

## 2022-03-24 PROCEDURE — G8484 FLU IMMUNIZE NO ADMIN: HCPCS | Performed by: NURSE PRACTITIONER

## 2022-03-24 PROCEDURE — G8427 DOCREV CUR MEDS BY ELIG CLIN: HCPCS | Performed by: NURSE PRACTITIONER

## 2022-03-24 PROCEDURE — G8417 CALC BMI ABV UP PARAM F/U: HCPCS | Performed by: NURSE PRACTITIONER

## 2022-03-24 PROCEDURE — 99214 OFFICE O/P EST MOD 30 MIN: CPT | Performed by: NURSE PRACTITIONER

## 2022-03-24 PROCEDURE — 4040F PNEUMOC VAC/ADMIN/RCVD: CPT | Performed by: NURSE PRACTITIONER

## 2022-03-24 RX ORDER — OXYCODONE HYDROCHLORIDE AND ACETAMINOPHEN 5; 325 MG/1; MG/1
1 TABLET ORAL 2 TIMES DAILY PRN
Qty: 60 TABLET | Refills: 0 | Status: SHIPPED | OUTPATIENT
Start: 2022-03-29 | End: 2022-04-25 | Stop reason: SDUPTHER

## 2022-03-24 RX ORDER — GABAPENTIN 300 MG/1
300 CAPSULE ORAL NIGHTLY
Qty: 90 CAPSULE | Refills: 0 | Status: SHIPPED | OUTPATIENT
Start: 2022-03-24 | End: 2022-06-06 | Stop reason: SDUPTHER

## 2022-03-24 ASSESSMENT — ENCOUNTER SYMPTOMS
BLOOD IN STOOL: 0
SHORTNESS OF BREATH: 0
BACK PAIN: 1

## 2022-03-24 NOTE — PROGRESS NOTES
Karthik Melgoza  (1945)    3/24/2022    Subjective:     Karthik Melgoza is 68 y.o. male who complains today of:    Chief Complaint   Patient presents with    Back Pain    Knee Pain         Allergies:  Caffeine, Codeine, Penicillins, and Shellfish-derived products    Past Medical History:   Diagnosis Date    Chicken pox     Chronic back pain     Congenital heart disease     Mumps     Osteoarthritis     Rheumatic fever      Past Surgical History:   Procedure Laterality Date    CARDIAC SURGERY  03/10/2003, 2005    ALSO STENTS WERE PLACED. Family History   Problem Relation Age of Onset    High Blood Pressure Mother     Stroke Mother     High Blood Pressure Father      Social History     Socioeconomic History    Marital status:      Spouse name: Not on file    Number of children: Not on file    Years of education: Not on file    Highest education level: Not on file   Occupational History    Not on file   Tobacco Use    Smoking status: Former Smoker     Packs/day: 0.50     Years: 45.00     Pack years: 22.50     Types: Cigarettes     Quit date: 3/1/2005     Years since quittin.0    Smokeless tobacco: Never Used   Substance and Sexual Activity    Alcohol use: No     Alcohol/week: 0.0 standard drinks     Comment: RARELY    Drug use: Not on file    Sexual activity: Not on file   Other Topics Concern    Not on file   Social History Narrative    Not on file     Social Determinants of Health     Financial Resource Strain:     Difficulty of Paying Living Expenses: Not on file   Food Insecurity:     Worried About [address] in the Last Year: Not on file    Chery of Food in the Last Year: Not on file   Transportation Needs:     Lack of Transportation (Medical): Not on file    Lack of Transportation (Non-Medical):  Not on file   Physical Activity:     Days of Exercise per Week: Not on file    Minutes of Exercise per Session: Not on file   Stress:     Feeling of Stress : Not on file   Social Connections:     Frequency of Communication with Friends and Family: Not on file    Frequency of Social Gatherings with Friends and Family: Not on file    Attends Hoahaoism Services: Not on file    Active Member of Clubs or Organizations: Not on file    Attends Club or Organization Meetings: Not on file    Marital Status: Not on file   Intimate Partner Violence:     Fear of Current or Ex-Partner: Not on file    Emotionally Abused: Not on file    Physically Abused: Not on file    Sexually Abused: Not on file   Housing Stability:     Unable to Pay for Housing in the Last Year: Not on file    Number of [place] in the Last Year: Not on file    Unstable Housing in the Last Year: Not on file       Current Outpatient Medications on File Prior to Visit   Medication Sig Dispense Refill    Clopidogrel Bisulfate (PLAVIX PO) Take by mouth      Handicap Placard MISC by Does not apply route Valid 9/1/19- 9/1/23 1 each 0    allopurinol (ZYLOPRIM) 100 MG tablet Take 200 mg by mouth daily      aspirin 81 MG tablet Take 81 mg by mouth daily      Omega-3 Fatty Acids (FISH OIL) 1000 MG CPDR Take 2 tablets by mouth 2 times daily      NONFORMULARY as needed Indications: METALAZONE 25MG  1 TAB PRN      omeprazole (PRILOSEC) 20 MG capsule Take 20 mg by mouth daily      ezetimibe-simvastatin (VYTORIN) 10-20 MG per tablet Take 1 tablet by mouth nightly      carvedilol (COREG) 25 MG tablet       ONE TOUCH ULTRA TEST strip   0    ONETOUCH DELICA LANCETS 30M MISC   0    metFORMIN (GLUCOPHAGE) 1000 MG tablet       montelukast (SINGULAIR) 10 MG tablet       NITROSTAT 0.4 MG SL tablet   0    potassium chloride SA (K-DUR;KLOR-CON M) 20 MEQ tablet       spironolactone (ALDACTONE) 25 MG tablet   0    torsemide (DEMADEX) 100 MG tablet        No current facility-administered medications on file prior to visit.            Pt presents today for a f/u of chronic low back and right knee pain. He has been sick and difficulty \"bouncing back\". Had pacemaker/ICD 4 months ago. Says he needs re-evaluated for PT. Was previously going twice a week. Pt feels pain level and functioning improves with prescribed medications and is able to walk without cane. Pt feels that walking & cold weather aggravates the pain. Pt denies radiating numbness and tingling. Gel injections with Dr. Katelin Ramirez in past.  He says he did see ortho. Cannot have MRI. He says has knee brace and cane on occasion. had cardiac valve repair in July 2020. He has tried Rt SI joint and facet joint injections in the past with relief at that time. 8/9/21 R knee inj  4/5/21 BL gel one knees        Review of Systems   Constitutional: Negative for appetite change and fever. HENT: Negative for hearing loss. Eyes: Negative for visual disturbance. Respiratory: Negative for shortness of breath. Gastrointestinal: Negative for blood in stool. Genitourinary: Negative for difficulty urinating and hematuria. Musculoskeletal: Positive for arthralgias and back pain. Skin: Negative for rash. Neurological: Negative for facial asymmetry. Hematological: Negative for adenopathy. Psychiatric/Behavioral: Negative for self-injury. All other systems reviewed and are negative. Objective:     Vitals:  Temp 97.8 °F (36.6 °C)   Ht 5' 8\" (1.727 m)   Wt 260 lb (117.9 kg)   BMI 39.53 kg/m² Pain Score:   7      Physical Exam  Vitals and nursing note reviewed. Pt is alert and oriented x 3.  Recent and remote memory is intact.  Mood, judgement and affect are normal.  No signs of distress or SOB noted.  Visualized skin intact.  Sensation intact to light touch. Decreased ROM with flexion and extension of low back.  Tender with palpation to bilateral lumbar spine with positive provacative maneuvers noted.  Negative SLR.    Pt is unable to briefly heel walk and toe walk.   Strength, balance, and coordination are diminished but functional for ambulation.  Left knee with decreased ROM.  Tenderness noted with palpation. Mild swelling noted with arthritic deformity noted.  Crepitus with ROM.  Gait antalgic.  Uses cane. Assessment:      Diagnosis Orders   1. Spondylosis of lumbar region without myelopathy or radiculopathy  Ambulatory referral to Physical Therapy    oxyCODONE-acetaminophen (PERCOCET) 5-325 MG per tablet   2. Arthritis of knee, right  Ambulatory referral to Physical Therapy    oxyCODONE-acetaminophen (PERCOCET) 5-325 MG per tablet   3. Chronic pain syndrome  oxyCODONE-acetaminophen (PERCOCET) 5-325 MG per tablet   4. Primary osteoarthritis of right knee  oxyCODONE-acetaminophen (PERCOCET) 5-325 MG per tablet   5. Meralgia paraesthetica, right  gabapentin (NEURONTIN) 300 MG capsule       Plan:     Periodic Controlled Substance Monitoring: Possible medication side effects, risk of tolerance/dependence & alternative treatments discussed. ,No signs of potential drug abuse or diversion identified. ,Assessed functional status. ,Obtaining appropriate analgesic effect of treatment. (Chele Hayden, APRN - CNP)    Orders Placed This Encounter   Medications    oxyCODONE-acetaminophen (PERCOCET) 5-325 MG per tablet     Sig: Take 1 tablet by mouth 2 times daily as needed for Pain for up to 30 days. Dispense:  60 tablet     Refill:  0     Reduce doses taken as pain becomes manageable    gabapentin (NEURONTIN) 300 MG capsule     Sig: Take 1 capsule by mouth nightly for 90 days. Dispense:  90 capsule     Refill:  0       Orders Placed This Encounter   Procedures    Ambulatory referral to Physical Therapy     Referral Priority:   Routine     Referral Type:   Eval and Treat     Referral Reason:   Specialty Services Required     Requested Specialty:   Physical Therapy     Number of Visits Requested:   1     Discussed options with the patient today. Continue Percocet and Gabapentin (90 day RX).  Needs new PT evaluation per insurance.    All questions were answered.  Pt verbalized understanding and agrees with above plan. Utox reviewed and appropriate from 6/24/21. Narcan declined 4/29/21.     Will continue medications for chronic pain as they help pt function with ADL and improve quality of life.  Discussed possible risks of opiate medication with pt, including but not limited to, constipation, nausea or vomiting, sedation, urinary retention, dependence and possible addiction. Pt agrees to use medication as directed. Pt advised to not use opiates while driving or operating heavy equipment, or in situations where pt may harm him/herself or others.  Pt is aware that while on narcotics, pt needs to be seen monthly to reassess pain and need for continued medication.  NDP reviewed. Rosalba Macias was reviewed.  This NP saw pt under direct supervision of Dr. Beau Kennedy    Follow up:  Return in about 4 weeks (around 4/21/2022) for review meds and reassess pain.     John Whitman, APRN - CNP

## 2022-04-05 ENCOUNTER — HOSPITAL ENCOUNTER (OUTPATIENT)
Dept: PHYSICAL THERAPY | Age: 77
Setting detail: THERAPIES SERIES
Discharge: HOME | End: 2022-04-05

## 2022-04-05 NOTE — PROGRESS NOTES
Therapy                            Cancellation/No-show Note      Date:  2022  Patient Name:  Aliyah Lehman  :  1945   MRN:  [id number]  Referring Practitioner: FRANK Benítez CNP  Diagnosis: Lumbar spondylosis and R knee arthritis    Visit Information:  PT Visit Information  Onset Date: 20  PT Insurance Information: Medicare/Medical [place]  Total # of Visits Approved:  (BMN)  Total # of Visits to Date: 12  Plan of Care/Certification Expiration Date: 03/10/22  No Show: 0  Canceled Appointment: 6  Progress Note Counter:  (PN due 3/10/22)    (cx'd on 2022- in hospital)    For today's appointment patient:  [x]  Cancelled  []  Rescheduled appointment  []  No-show   []  Called pt to remind of next appointment     Reason given by patient:  []  Patient ill  []  Conflicting appointment  []  No transportation    []  Conflict with work  []  No reason given  [x]  Other: In hospital      [] Pt has future appointments scheduled, no follow up needed  [] Pt requests to be on hold.      Reason:   If > 2 weeks please discuss with therapist.  [] Therapist to call pt for follow up     Comments:       Signature: Electronically signed by Braden Fall PTA on 22 at 10:48 AM EDT

## 2022-04-06 LAB — SURGICAL PATHOLOGY REPORT: NORMAL

## 2022-04-21 NOTE — PROGRESS NOTES
[address]  [address]  AZOZE:[phone]     []? Certification  []? Recertification      []? Plan of Care  []? Progress Note [x]? Discharge                            To:  FRANK Crooks - CNP        From:  Ashley Shaffer, PT  Patient: Nica Hernandez     : 1945  Diagnosis: Lumbar spondylosis and R knee arthritis     Date: 2022  Treatment Diagnosis: LBP with R knee pain and strength and ROM deficits.      Plan of Care/Certification Expiration Date: 03/10/22  Progress Report Period from:  2/10/2022  to 3/3/2022      Total # of Visits to Date: 12   No Show: 0    Canceled Appointment: 6      OBJECTIVE:   Short Term Goals - Time Frame for Short term goals: 2 weeks    Goals Current/Discharge status  Met   Short term goal 1: Decrease lumbar and R knee pain 50% to assist with improved functional gains. Not tested due to unexpected discharge.   []? yes  [x]? no      Long Term Goals - Time Frame for Long term goals : 4-6 weeks  Goals Current/ Discharge status Met   Long term goal 1: Indep HEP for symptom management Pt independent with given HEP [x]? yes  []? no   Long term goal 2: Pt demo improved overall function by reporting greater than 80% per functional survey score Not tested due to unexpected discharge. []? yes  [x]? no   Long term goal 3: Improve B hip/knee  strength 4/5 to 4+/5 to allow patient to perfrom bed mobility with min to no assist. Taken 3/3/22:  Strength RLE  Comment: Hip flex 4/5, hip abd, 4-/5, knee flx 4-/5, knee ext 4+/5  Strength LLE  Comment: Hip flex 4-/5, hip abd, 4/5, knee flx 4-/5, knee ext 4+/5 []? yes  [x]? no   Long term goal 4: Pain-free lumbar AROM to 50% WNL allowing an increase in ADL tolerance. Not tested due to unexpected discharge. []? yes  [x]? no   Long term goal 5: Pain-free R knee AROM to 0-125 deg  WNL allowing an increase in ADL tolerance.  Not tested due to unexpected discharge.   []? yes  [x]? no       Body structures, Functions, Activity limitations: Decreased ROM,Decreased strength,Increased pain,Decreased posture,Decreased functional mobility   Assessment:  Pt did not return to PT after 3/3/22 unable to determine functional outcomes d/t unexpected D/C. Pt having other health issues and requests to be discharged at this time. Prognosis: Good      PLAN: [x]? Discharge                                                     Patient Status:[]? Continue/ Initiate plan of Care                          [x]? Discharge PT. Recommend pt continue with HEP.                            []? Additional visits requested, Please re-certify for additional visits:                                                    Objective info by: Electronically signed by Diana Armenta PTA on 4/21/22 at 9:39 AM EDT  Signature: Electronically signed by Kandi Monsivais PT on 4/21/2022 at 3:24 PM

## 2022-04-25 DIAGNOSIS — M17.11 ARTHRITIS OF KNEE, RIGHT: ICD-10-CM

## 2022-04-25 DIAGNOSIS — G89.4 CHRONIC PAIN SYNDROME: ICD-10-CM

## 2022-04-25 DIAGNOSIS — M47.816 SPONDYLOSIS OF LUMBAR REGION WITHOUT MYELOPATHY OR RADICULOPATHY: ICD-10-CM

## 2022-04-25 DIAGNOSIS — M17.11 PRIMARY OSTEOARTHRITIS OF RIGHT KNEE: ICD-10-CM

## 2022-04-25 RX ORDER — OXYCODONE HYDROCHLORIDE AND ACETAMINOPHEN 5; 325 MG/1; MG/1
1 TABLET ORAL 2 TIMES DAILY PRN
Qty: 8 TABLET | Refills: 0 | Status: SHIPPED | OUTPATIENT
Start: 2022-04-28 | End: 2022-05-02 | Stop reason: SDUPTHER

## 2022-05-02 ENCOUNTER — OFFICE VISIT (OUTPATIENT)
Dept: PAIN MANAGEMENT | Age: 77
End: 2022-05-02
Payer: MEDICARE

## 2022-05-02 VITALS
SYSTOLIC BLOOD PRESSURE: 130 MMHG | BODY MASS INDEX: 39.4 KG/M2 | DIASTOLIC BLOOD PRESSURE: 79 MMHG | WEIGHT: 260 LBS | HEIGHT: 68 IN | TEMPERATURE: 97.1 F

## 2022-05-02 DIAGNOSIS — G89.4 CHRONIC PAIN SYNDROME: ICD-10-CM

## 2022-05-02 DIAGNOSIS — M17.11 PRIMARY OSTEOARTHRITIS OF RIGHT KNEE: ICD-10-CM

## 2022-05-02 DIAGNOSIS — M47.816 SPONDYLOSIS OF LUMBAR REGION WITHOUT MYELOPATHY OR RADICULOPATHY: ICD-10-CM

## 2022-05-02 DIAGNOSIS — M17.11 ARTHRITIS OF KNEE, RIGHT: ICD-10-CM

## 2022-05-02 PROCEDURE — G8427 DOCREV CUR MEDS BY ELIG CLIN: HCPCS | Performed by: NURSE PRACTITIONER

## 2022-05-02 PROCEDURE — 1123F ACP DISCUSS/DSCN MKR DOCD: CPT | Performed by: NURSE PRACTITIONER

## 2022-05-02 PROCEDURE — G8417 CALC BMI ABV UP PARAM F/U: HCPCS | Performed by: NURSE PRACTITIONER

## 2022-05-02 PROCEDURE — 1036F TOBACCO NON-USER: CPT | Performed by: NURSE PRACTITIONER

## 2022-05-02 PROCEDURE — 99214 OFFICE O/P EST MOD 30 MIN: CPT | Performed by: NURSE PRACTITIONER

## 2022-05-02 PROCEDURE — 4040F PNEUMOC VAC/ADMIN/RCVD: CPT | Performed by: NURSE PRACTITIONER

## 2022-05-02 RX ORDER — OXYCODONE HYDROCHLORIDE AND ACETAMINOPHEN 5; 325 MG/1; MG/1
1 TABLET ORAL 2 TIMES DAILY PRN
Qty: 60 TABLET | Refills: 0 | Status: SHIPPED | OUTPATIENT
Start: 2022-05-02 | End: 2022-06-06 | Stop reason: SDUPTHER

## 2022-05-02 ASSESSMENT — ENCOUNTER SYMPTOMS
COUGH: 0
CONSTIPATION: 0
DIARRHEA: 0
SHORTNESS OF BREATH: 0
EYES NEGATIVE: 1
NAUSEA: 0
BACK PAIN: 1
BLOOD IN STOOL: 1

## 2022-05-02 NOTE — PROGRESS NOTES
Lul Crockett  (1945)    2022    Subjective:     Lul Crockett is 68 y.o. male who complains today of:    Chief Complaint   Patient presents with    Knee Pain     right and left    Back Pain         Allergies:  Caffeine, Codeine, Penicillins, and Shellfish-derived products    Past Medical History:   Diagnosis Date    Chicken pox     Chronic back pain     Congenital heart disease     Mumps     Osteoarthritis     Rheumatic fever      Past Surgical History:   Procedure Laterality Date    CARDIAC SURGERY  03/10/2003, 2005    ALSO STENTS WERE PLACED. Family History   Problem Relation Age of Onset    High Blood Pressure Mother     Stroke Mother     High Blood Pressure Father      Social History     Socioeconomic History    Marital status:      Spouse name: Not on file    Number of children: Not on file    Years of education: Not on file    Highest education level: Not on file   Occupational History    Not on file   Tobacco Use    Smoking status: Former Smoker     Packs/day: 0.50     Years: 45.00     Pack years: 22.50     Types: Cigarettes     Quit date: 3/1/2005     Years since quittin.1    Smokeless tobacco: Never Used   Substance and Sexual Activity    Alcohol use: No     Alcohol/week: 0.0 standard drinks     Comment: RARELY    Drug use: Not on file    Sexual activity: Not on file   Other Topics Concern    Not on file   Social History Narrative    Not on file     Social Determinants of Health     Financial Resource Strain:     Difficulty of Paying Living Expenses: Not on file   Food Insecurity:     Worried About [address] in the Last Year: Not on file    Chery of Food in the Last Year: Not on file   Transportation Needs:     Lack of Transportation (Medical): Not on file    Lack of Transportation (Non-Medical):  Not on file   Physical Activity:     Days of Exercise per Week: Not on file    Minutes of Exercise per Session: Not on file Stress:     Feeling of Stress : Not on file   Social Connections:     Frequency of Communication with Friends and Family: Not on file    Frequency of Social Gatherings with Friends and Family: Not on file    Attends Anabaptism Services: Not on file    Active Member of [address] [place] [address] or Organizations: Not on file    Attends Club or Organization Meetings: Not on file    Marital Status: Not on file   Intimate Partner Violence:     Fear of Current or Ex-Partner: Not on file    Emotionally Abused: Not on file    Physically Abused: Not on file    Sexually Abused: Not on file   Housing Stability:     Unable to Pay for Housing in the Last Year: Not on file    Number of [place] in the Last Year: Not on file    Unstable Housing in the Last Year: Not on file       Current Outpatient Medications on File Prior to Visit   Medication Sig Dispense Refill    gabapentin (NEURONTIN) 300 MG capsule Take 1 capsule by mouth nightly for 90 days.  90 capsule 0    Clopidogrel Bisulfate (PLAVIX PO) Take by mouth      Handicap Placard MISC by Does not apply route Valid 9/1/19- 9/1/23 1 each 0    allopurinol (ZYLOPRIM) 100 MG tablet Take 200 mg by mouth daily      aspirin 81 MG tablet Take 81 mg by mouth daily      Omega-3 Fatty Acids (FISH OIL) 1000 MG CPDR Take 2 tablets by mouth 2 times daily      NONFORMULARY as needed Indications: METALAZONE 25MG  1 TAB PRN      omeprazole (PRILOSEC) 20 MG capsule Take 20 mg by mouth daily      ezetimibe-simvastatin (VYTORIN) 10-20 MG per tablet Take 1 tablet by mouth nightly      carvedilol (COREG) 25 MG tablet       ONE TOUCH ULTRA TEST strip   0    ONETOUCH DELICA LANCETS 24R MISC   0    metFORMIN (GLUCOPHAGE) 1000 MG tablet       montelukast (SINGULAIR) 10 MG tablet       NITROSTAT 0.4 MG SL tablet   0    potassium chloride SA (K-DUR;KLOR-CON M) 20 MEQ tablet       spironolactone (ALDACTONE) 25 MG tablet   0    torsemide (DEMADEX) 100 MG tablet        No current facility-administered medications on file prior to visit. Pt presents today for a f/u of his pain. PCP is Dr. Edilson Dumont MD.  Pt last saw Licha Torres NP. PT ordered last OV and this was cancelled. Here with a friend, Esthela Wolff. He was in the hospital again \"I had internal bleeding again\" in upper GI and has a f/u this week. \"I hurt all over\" and having more low back pain than knee pain recently. He has chronic low back and right knee pain. Had pacemaker/ICD 4 months ago. Says he needs re-evaluated for PT. Was previously going twice a week. Gel injections with Dr. Andreea Payne in past.  He says he did see ortho. Cannot have MRI. He says has knee brace and cane on occasion. had cardiac valve repair in July 2020. He has tried Rt SI joint and facet joint injections in the past with relief at that time. Past injections:  8/9/21 R knee inj  4/5/21 BL gel one knees. He says he did see ortho. Cannot have MRI. He says has knee brace. Using cane. He is now using his Rolator or cane at times. He reports he had cardiac valve repair in July. He has tried Rt SI joint injections and B/L L3-4, L4-5, L5-S1 facet joint injections in the past with relief at that time. Flexion and extension XR of low back from 2019 shows Grade 1 spondylolisthesis per report. He typically uses his Percocet 5mg BID PRN pain and uses gabapentin at night only     Pt feels pain level with his medication is 4-5/10, and 8/10 without medication. Pt feels that standing, walking makes the pain worse, and medication, sitting makes the pain better. Pt feels his medication helps   him function and improve his quality of life, specifically says allows to do ADL's. Pt denies radiating numbness and tingling. Denies recent falls, injuries or trauma. Pt denies new weakness. Pt reports PT has been done in the past.         Review of Systems   Constitutional: Positive for fatigue. Negative for fever. HENT: Negative. Eyes: Negative. Respiratory: Negative for cough and shortness of breath. Cardiovascular: Negative for chest pain. Gastrointestinal: Positive for blood in stool. Negative for constipation, diarrhea and nausea. Endocrine: Negative. Genitourinary: Negative. Musculoskeletal: Positive for arthralgias and back pain. Skin: Negative. Neurological: Negative for dizziness and weakness. Psychiatric/Behavioral: Negative. Objective:     Vitals:  /79 (Site: Left Upper Arm)   Temp 97.1 °F (36.2 °C) (Temporal)   Ht 5' 8\" (1.727 m)   Wt 260 lb (117.9 kg)   BMI 39.53 kg/m² Pain Score:   8      Physical Exam  Vitals and nursing note reviewed. This is a pleasant obese male who answers questions appropriately and follows commands.  Here with friend, Enrique Awad, today. Pt is alert and oriented x 3.  Recent and remote memory is intact.  Mood and affect, judgement and insight are normal.  No signs of distress, no dyspnea or SOB noted. HEENT: PERRL. Decreased ROM with flexion and extension of low back. Mild tenderness with palpation to lumbar spine.  Negative SLR but reproduces low back pain.  Tightness in both hamstrings noted. Rt knee with decreased ROM.  Mild swelling noted with arthritic deformity noted.  Crepitus with ROM.   Balance and coordination normal.  Strength is functional for ambulation. Slow with position changes. Seen in w/c today. Will stand when requested. Cranial nerves II-XII are intact. Assessment:      Diagnosis Orders   1. Arthritis of knee, right  oxyCODONE-acetaminophen (PERCOCET) 5-325 MG per tablet   2. Chronic pain syndrome  oxyCODONE-acetaminophen (PERCOCET) 5-325 MG per tablet   3. Primary osteoarthritis of right knee  oxyCODONE-acetaminophen (PERCOCET) 5-325 MG per tablet   4.  Spondylosis of lumbar region without myelopathy or radiculopathy  oxyCODONE-acetaminophen (PERCOCET) 5-325 MG per tablet    CHG FLUOR NEEDLE/CATH SPINE/PARASPINAL DX/THER ADDON    DC INJ DX/THER AGNT PARAVERT FACET JOINT, LUMBAR/SAC, 1ST LEVEL    OR INJ DX/THER AGNT PARAVERT FACET JOINT, LUMBAR/SAC, 2ND LEVEL       Plan:     Periodic Controlled Substance Monitoring: Possible medication side effects, risk of tolerance/dependence & alternative treatments discussed. ,No signs of potential drug abuse or diversion identified. ,Assessed functional status. ,Obtaining appropriate analgesic effect of treatment. (Carolina Alexis, APRN - CNP)    Orders Placed This Encounter   Medications    oxyCODONE-acetaminophen (PERCOCET) 5-325 MG per tablet     Sig: Take 1 tablet by mouth 2 times daily as needed for Pain for up to 30 days. Dispense:  60 tablet     Refill:  0     Reduce doses taken as pain becomes manageable       Orders Placed This Encounter   Procedures    CHG FLUOR NEEDLE/CATH SPINE/PARASPINAL DX/THER ADDON     Standing Status:   Future     Standing Expiration Date:   7/31/2022    OR INJ DX/THER AGNT PARAVERT FACET JOINT, LUMBAR/SAC, 1ST LEVEL     B/L L3,4,5 MBB with SK     Standing Status:   Future     Standing Expiration Date:   7/31/2022    OR INJ DX/THER AGNT PARAVERT FACET JOINT, LUMBAR/SAC, 2ND LEVEL     Standing Status:   Future     Standing Expiration Date:   7/31/2022     Discussed options with the patient today. Anatomic model pathology was shown and reviewed with pt. We will go ahead and order diagnostic bilateral L3, L4, L5 medial branch blocks with Dr. Annabel Obando to see if the patient is a candidate for RF ablation. he has failed conservative treatment in the past. Anatomic model of pathology was shown. Risks and benefits of the procedure were discussed. All questions were answered and patient understands and agrees with the plan. Will continue current dose of Percocet 5mg BID PRN pain and has gabapentin 300mg daily as they help him function. F/U with GI. Discussed home exercise program.  Relevant imaging and pain generators reviewed. Pt verbalized understanding and agrees with above plan.  Pt has chronic pain. Utox reviewed and appropriate from June 2021. ORT score 0 - low risk. Narcan Rx declined. Will continue medications for chronic pain that has been previously directed as they do help pt function with ADL and improve quality of life. Pt is aware goal is to use the least amount of medication possible to allow pt to function and help with quality of life as discussed in detail. Ideal to keep MME below 50 which it is  Have discussed proper disposal of narcotic medication. Advised to have medications in safe place and locked up/in lock box. Discussed possible risks of opiate medication with pt, including, but not limited to possible constipation, nausea or vomiting, sedation, urinary retention, dependence and possible addiction. Pt agrees to use medication as prescribed/as directed. Pt advised to not use opiates while driving or operating heavy equipment, or in situations where pt may harm him/herself or others. Pt is aware that while on narcotics, pt needs to be seen to reassess pain and reassess need for continued medication at that time. NDP reviewed. OARRS was reviewed. This NP saw pt under direct supervision of Dr. John Storey. Follow up:  Return in about 4 weeks (around 5/30/2022) for f/u after procedure and reassess pain/medications.     Marylu Corley, APRN - CNP

## 2022-05-10 ENCOUNTER — PROCEDURE VISIT (OUTPATIENT)
Dept: PAIN MANAGEMENT | Age: 77
End: 2022-05-10
Payer: MEDICARE

## 2022-05-10 DIAGNOSIS — M47.817 LUMBOSACRAL SPONDYLOSIS WITHOUT MYELOPATHY: Primary | ICD-10-CM

## 2022-05-10 DIAGNOSIS — M47.816 SPONDYLOSIS OF LUMBAR REGION WITHOUT MYELOPATHY OR RADICULOPATHY: ICD-10-CM

## 2022-05-10 PROCEDURE — 64493 INJ PARAVERT F JNT L/S 1 LEV: CPT | Performed by: PAIN MEDICINE

## 2022-05-10 PROCEDURE — 64494 INJ PARAVERT F JNT L/S 2 LEV: CPT | Performed by: PAIN MEDICINE

## 2022-05-10 RX ORDER — LIDOCAINE HYDROCHLORIDE 10 MG/ML
10 INJECTION, SOLUTION EPIDURAL; INFILTRATION; INTRACAUDAL; PERINEURAL ONCE
Status: COMPLETED | OUTPATIENT
Start: 2022-05-10 | End: 2022-05-10

## 2022-05-10 RX ORDER — BETAMETHASONE SODIUM PHOSPHATE AND BETAMETHASONE ACETATE 3; 3 MG/ML; MG/ML
6 INJECTION, SUSPENSION INTRA-ARTICULAR; INTRALESIONAL; INTRAMUSCULAR; SOFT TISSUE ONCE
Status: COMPLETED | OUTPATIENT
Start: 2022-05-10 | End: 2022-05-10

## 2022-05-10 RX ADMIN — LIDOCAINE HYDROCHLORIDE 10 MG: 10 INJECTION, SOLUTION EPIDURAL; INFILTRATION; INTRACAUDAL; PERINEURAL at 13:48

## 2022-05-10 RX ADMIN — BETAMETHASONE SODIUM PHOSPHATE AND BETAMETHASONE ACETATE 6 MG: 3; 3 INJECTION, SUSPENSION INTRA-ARTICULAR; INTRALESIONAL; INTRAMUSCULAR; SOFT TISSUE at 13:48

## 2022-05-10 NOTE — PROGRESS NOTES
[facility]) Physicians  Neurosurgery and Pain [place] [id number] [facility], [address] [facility] , [address] [facility], [place] [address]: [phone]  F: [phone]      [address]      Provider: Adelina Delarosa DO          Patient Name: Lucien Mistry : 1945        Date: 5/10/2022       Lucien Mistry is here today for interventional pain management. Standard ASIPP guidelines were followed and sterile technique used. Area was cleaned with Betadine x3. Informed consent was obtained. Fluoroscopic guidance was used for this procedure. Junction of superior articular process and transverse process was identified. For L5 dorsal primary ramus groove of sacral ala and SAP of S1 was identified. Appropriate length spinal needle was used and advanced to correct anatomic location. Negative aspiration was achieved. At each site approximately 1/2cc of 1% preservative free Lidocaine was injected without difficulty. 6 mg Celestone added. Patient tolerated the procedure well, no obvious complications occurred during the procedure. Patient was appropriately monitored and discharged home in stable condition with their usual motor strength. The patient will keep close track of pain over the next several hours and call our office tomorrow and let us know what percentage of pain relief is experienced. Post op Instructions were given to patient. Relevant and recent imaging reviewed with patient today. [x] Bilateral [] T12 [] S1      [] L1 [] S2     [] Right [] L2 [] S3      [x] L3      [] Left [x] L4         [x] L5 [] S. I. May need second MBB.     Adelina Delarosa DO

## 2022-05-25 DIAGNOSIS — G57.11 MERALGIA PARAESTHETICA, RIGHT: ICD-10-CM

## 2022-05-25 RX ORDER — GABAPENTIN 300 MG/1
CAPSULE ORAL
Qty: 90 CAPSULE | Refills: 3 | OUTPATIENT
Start: 2022-05-25

## 2022-06-06 ENCOUNTER — SCHEDULED TELEPHONE ENCOUNTER (OUTPATIENT)
Dept: PAIN MANAGEMENT | Age: 77
End: 2022-06-06
Payer: MEDICARE

## 2022-06-06 DIAGNOSIS — M17.11 PRIMARY OSTEOARTHRITIS OF RIGHT KNEE: ICD-10-CM

## 2022-06-06 DIAGNOSIS — G57.11 MERALGIA PARAESTHETICA, RIGHT: ICD-10-CM

## 2022-06-06 DIAGNOSIS — M17.11 ARTHRITIS OF KNEE, RIGHT: ICD-10-CM

## 2022-06-06 DIAGNOSIS — G89.4 CHRONIC PAIN SYNDROME: ICD-10-CM

## 2022-06-06 DIAGNOSIS — M47.816 SPONDYLOSIS OF LUMBAR REGION WITHOUT MYELOPATHY OR RADICULOPATHY: Primary | ICD-10-CM

## 2022-06-06 PROCEDURE — 1123F ACP DISCUSS/DSCN MKR DOCD: CPT | Performed by: NURSE PRACTITIONER

## 2022-06-06 PROCEDURE — 99213 OFFICE O/P EST LOW 20 MIN: CPT | Performed by: NURSE PRACTITIONER

## 2022-06-06 RX ORDER — GABAPENTIN 300 MG/1
300 CAPSULE ORAL NIGHTLY
Qty: 90 CAPSULE | Refills: 0 | Status: SHIPPED | OUTPATIENT
Start: 2022-06-06 | End: 2022-07-14 | Stop reason: SDUPTHER

## 2022-06-06 RX ORDER — OXYCODONE HYDROCHLORIDE AND ACETAMINOPHEN 5; 325 MG/1; MG/1
1 TABLET ORAL 2 TIMES DAILY PRN
Qty: 60 TABLET | Refills: 0 | Status: SHIPPED | OUTPATIENT
Start: 2022-06-06 | End: 2022-07-14 | Stop reason: SDUPTHER

## 2022-06-06 ASSESSMENT — ENCOUNTER SYMPTOMS
EYES NEGATIVE: 1
NAUSEA: 0
COUGH: 0
CONSTIPATION: 0
BACK PAIN: 1
GASTROINTESTINAL NEGATIVE: 1
SHORTNESS OF BREATH: 0
DIARRHEA: 0

## 2022-06-06 NOTE — PROGRESS NOTES
Brandy Cisse  (1945)    2022    TELEHEALTH EVALUATION -- Audio/Visual (During J- public health emergency)    Due to COVID 19 outbreak, patient's office visit was converted to a virtual visit. Patient was contacted and agreed to proceed with a virtual visit via audio-visual platform. Patient understands that this encounter is a billable visit and that insurance coverage and co-pays are up to their individual insurance plans. At the beginning of this telehealth encounter, I verified with the patient their name and date of birth. Verbal consent for telehealth encounter was obtained. Subjective:       Chief Complaint   Patient presents with    Back Pain    Knee Pain    Shoulder Pain     RIGHT       Brandy Cisse is 68 y.o. male who complains today of: Allergies:  Caffeine, Codeine, Penicillins, and Shellfish-derived products    History:  Past Medical History:   Diagnosis Date    Chicken pox     Chronic back pain     Congenital heart disease     Mumps     Osteoarthritis     Rheumatic fever      Past Surgical History:   Procedure Laterality Date    CARDIAC SURGERY  03/10/2003, 2005    ALSO STENTS WERE PLACED.       Family History   Problem Relation Age of Onset    High Blood Pressure Mother     Stroke Mother     High Blood Pressure Father      Social History     Socioeconomic History    Marital status:      Spouse name: Not on file    Number of children: Not on file    Years of education: Not on file    Highest education level: Not on file   Occupational History    Not on file   Tobacco Use    Smoking status: Former Smoker     Packs/day: 0.50     Years: 45.00     Pack years: 22.50     Types: Cigarettes     Quit date: 3/1/2005     Years since quittin.2    Smokeless tobacco: Never Used   Substance and Sexual Activity    Alcohol use: No     Alcohol/week: 0.0 standard drinks     Comment: RARELY    Drug use: Not on file    Sexual activity: Not on file Other Topics Concern    Not on file   Social History Narrative    Not on file     Social Determinants of Health     Financial Resource Strain:     Difficulty of Paying Living Expenses: Not on file   Food Insecurity:     Worried About Running Out of Food in the Last Year: Not on file    Chery of Food in the Last Year: Not on file   Transportation Needs:     Lack of Transportation (Medical): Not on file    Lack of Transportation (Non-Medical):  Not on file   Physical Activity:     Days of Exercise per Week: Not on file    Minutes of Exercise per Session: Not on file   Stress:     Feeling of Stress : Not on file   Social Connections:     Frequency of Communication with Friends and Family: Not on file    Frequency of Social Gatherings with Friends and Family: Not on file    Attends Quaker Services: Not on file    Active Member of [address] [place] [address] or Organizations: Not on file    Attends Club or Organization Meetings: Not on file    Marital Status: Not on file   Intimate Partner Violence:     Fear of Current or Ex-Partner: Not on file    Emotionally Abused: Not on file    Physically Abused: Not on file    Sexually Abused: Not on file   Housing Stability:     Unable to Pay for Housing in the Last Year: Not on file    Number of [place] in the Last Year: Not on file    Unstable Housing in the Last Year: Not on file       Current Outpatient Medications on File Prior to Visit   Medication Sig Dispense Refill    Clopidogrel Bisulfate (PLAVIX PO) Take by mouth      Handicap Placard MISC by Does not apply route Valid 9/1/19- 9/1/23 1 each 0    allopurinol (ZYLOPRIM) 100 MG tablet Take 200 mg by mouth daily      aspirin 81 MG tablet Take 81 mg by mouth daily      Omega-3 Fatty Acids (FISH OIL) 1000 MG CPDR Take 2 tablets by mouth 2 times daily      NONFORMULARY as needed Indications: METALAZONE 25MG  1 TAB PRN      omeprazole (PRILOSEC) 20 MG capsule Take 20 mg by mouth daily      ezetimibe-simvastatin (VYTORIN) 10-20 MG per tablet Take 1 tablet by mouth nightly      carvedilol (COREG) 25 MG tablet       ONE TOUCH ULTRA TEST strip   0    ONETOUCH DELICA LANCETS 26Q MISC   0    metFORMIN (GLUCOPHAGE) 1000 MG tablet       montelukast (SINGULAIR) 10 MG tablet       NITROSTAT 0.4 MG SL tablet   0    potassium chloride SA (K-DUR;KLOR-CON M) 20 MEQ tablet       spironolactone (ALDACTONE) 25 MG tablet   0    torsemide (DEMADEX) 100 MG tablet        No current facility-administered medications on file prior to visit. Pt's f/u done today with a telehealth visit for his pain d/t Covid 19 pandemic. PCP is Dr. Poornima Velazquez MD. He was advised to f/u with GI at his last OV. Patient last saw Dr. Adam Srivastava on 5/10/2022 for bilateral L3-4-5 MBB Celestone added. He says he is now able to stand a little longer. Still having back pain, but reports. Getting psoriasis on arm - seeing dermatology. He has a f/u in August with GI. He has chronic low back and right knee pain. Had pacemaker/ICD 4 months ago. Says he needs re-evaluated for PT. Was previously going twice a week. Gel injections with Dr. Mai Colon in past.  He says he did see ortho. Cannot have MRI. He says has knee brace and cane on occasion. had cardiac valve repair in July 2020. He has tried Rt SI joint and facet joint injections in the past with relief at that time.      Past injections:  8/9/21 R knee inj  4/5/21 BL gel one knees. He says he did see ortho. Cannot have MRI. He says has knee brace. Using cane. He is now using his Rolator or cane at times. He reports he had cardiac valve repair in July. He has tried Rt SI joint injections and B/L L3-4, L4-5, L5-S1 facet joint injections in the past with relief at that time. Flexion and extension XR of low back from 2019 shows Grade 1 spondylolisthesis per report.       He typically uses his Percocet 5mg BID PRN pain and uses gabapentin at night only.      Pt feels pain level with his medication is \"tolerable\", and worse without medication. Pt feels that standing, walking makes the pain worse, and medication, recent MBB makes the pain better. Pt feels his medication helps   him function and improve his quality of life, specifically says allows to do ADL's. Pt denies radiating numbness and tingling. Denies recent falls, injuries or trauma. Pt denies new weakness. Pt reports PT has been done. Review of Systems   Constitutional: Negative for fever. HENT: Negative. Eyes: Negative. Respiratory: Negative for cough and shortness of breath. Cardiovascular: Negative for chest pain. Gastrointestinal: Negative. Negative for constipation, diarrhea and nausea. Endocrine: Negative. Genitourinary: Negative. Musculoskeletal: Positive for arthralgias and back pain. Skin: Negative. Neurological: Negative for dizziness and weakness. Psychiatric/Behavioral: Negative. Objective:     Vitals: There were no vitals taken for this visit. PHYSICAL EXAMINATION:    [x] Alert  [x] Oriented to person/place/time  [x] No apparent distress      [x] Mood and affect normal  [x] Recent and remote memory intact  [x] Judgement and insight normal     [x] Breathing appears normal  [x] No rash, lesions or ulcers on visible skin    [] Cranial Nerves II-XII grossly intact    [x] Motor grossly intact in visible upper extremities    [] Motor grossly intact in visible lower extremities    [] Normal gait and station   [] No digital cyanosis    [] OTHER:      Due to this being a TeleHealth encounter, evaluation of the following organ systems is limited: Vitals/Constitutional/EENT/Resp/CV/GI//MS/Neuro/Skin/Heme-Lymph-Imm. Assessment:      Diagnosis Orders   1.  Spondylosis of lumbar region without myelopathy or radiculopathy  oxyCODONE-acetaminophen (PERCOCET) 5-325 MG per tablet    gabapentin (NEURONTIN) 300 MG capsule    CHG FLUOR NEEDLE/CATH SPINE/PARASPINAL DX/THER ADDON SC INJ DX/THER AGNT PARAVERT FACET JOINT, LUMBAR/SAC, 1ST LEVEL    SC INJ DX/THER AGNT PARAVERT FACET JOINT, LUMBAR/SAC, 2ND LEVEL   2. Arthritis of knee, right  oxyCODONE-acetaminophen (PERCOCET) 5-325 MG per tablet   3. Chronic pain syndrome  oxyCODONE-acetaminophen (PERCOCET) 5-325 MG per tablet   4. Primary osteoarthritis of right knee  oxyCODONE-acetaminophen (PERCOCET) 5-325 MG per tablet   5. Meralgia paraesthetica, right  gabapentin (NEURONTIN) 300 MG capsule       Plan:     Periodic Controlled Substance Monitoring: Possible medication side effects, risk of tolerance/dependence & alternative treatments discussed. ,No signs of potential drug abuse or diversion identified. ,Assessed functional status. ,Obtaining appropriate analgesic effect of treatment. (Carolina Alexis, APRN - CNP)    Orders Placed This Encounter   Medications    oxyCODONE-acetaminophen (PERCOCET) 5-325 MG per tablet     Sig: Take 1 tablet by mouth 2 times daily as needed for Pain for up to 30 days. Dispense:  60 tablet     Refill:  0     Reduce doses taken as pain becomes manageable    gabapentin (NEURONTIN) 300 MG capsule     Sig: Take 1 capsule by mouth nightly for 90 days. Dispense:  90 capsule     Refill:  0       Orders Placed This Encounter   Procedures    CHG FLUOR NEEDLE/CATH SPINE/PARASPINAL DX/THER ADDON     Standing Status:   Future     Standing Expiration Date:   9/4/2022    SC INJ DX/THER AGNT PARAVERT FACET JOINT, LUMBAR/SAC, 1ST LEVEL     B/L L3,4,5 MBB with SK     Standing Status:   Future     Standing Expiration Date:   9/4/2022    SC INJ DX/THER AGNT PARAVERT FACET JOINT, LUMBAR/SAC, 2ND LEVEL     Standing Status:   Future     Standing Expiration Date:   9/4/2022     Discussed options with the patient today. Telehealth visit d/t COVID-19 as noted above. We will go ahead and order 2nd diagnostic bilateral L3, L4, L5 medial branch blocks with Dr. Britney Bazan to see if the patient is a candidate for RF ablation.  he has failed conservative treatment in the past. Anatomic model of pathology was shown. Risks and benefits of the procedure were discussed. All questions were answered and patient understands and agrees with the plan. Pleased first set was diagnostic, but not lasting. Will continue current dose of Percocet 5mg BID PRN pain and has gabapentin 300mg daily as they help him function. .  All questions were answered. Discussed home exercise program.  Relevant imaging and pain generators reviewed. Pt verbalized understanding and agrees with above plan. Pt has chronic pain. Utox reviewed and appropriate from June 2021. ORT score 0 - low risk. Narcan Rx declined. Will continue medications for chronic pain that has been previously directed as they do help pt function with ADL and improve quality of life. Pt is aware goal is to use the least amount of medication possible to allow pt to function and help with quality of life as discussed in detail. Ideal to keep MME below 50 which it is. Have discussed proper disposal of narcotic medication. Advised to have medications in safe place and locked up/in lock box. Discussed possible risks of opiate medication with pt, including, but not limited to possible constipation, nausea or vomiting, sedation, urinary retention, dependence and possible addiction. Pt agrees to use medication as prescribed/as directed. Pt advised to not use opiates while driving or operating heavy equipment, or in situations where pt may harm him/herself or others. Pt is aware that while on narcotics, pt needs to be seen to reassess pain and reassess need for continued medication at that time. NDP reviewed. OARRS was reviewed. This NP saw pt under direct supervision of Dr. Gabe Brennan. Follow up:  Return in about 4 weeks (around 7/4/2022) for f/u after procedure and reassess pain/medications.     Carolina Corley, APRN - CNP      >50% of 14 minute appointment was spent with discussion, addressing questions and concerns, including prognosis, treatment options, patient education, and recommendations. Used Doxy. me      [id number]    Pursuant to the emergency declaration under the [address] [place] [address], [facility] waiver authority and the [employer] Act, this Virtual  Visit was conducted, with patient's consent, to reduce the patient's risk of exposure to COVID-19 and provide continuity of care for an established patient. Services were provided through a video synchronous discussion virtually to substitute for in-person clinic visit.

## 2022-06-14 ENCOUNTER — PROCEDURE VISIT (OUTPATIENT)
Dept: PAIN MANAGEMENT | Age: 77
End: 2022-06-14
Payer: MEDICARE

## 2022-06-14 DIAGNOSIS — M47.816 SPONDYLOSIS OF LUMBAR REGION WITHOUT MYELOPATHY OR RADICULOPATHY: ICD-10-CM

## 2022-06-14 PROCEDURE — 64494 INJ PARAVERT F JNT L/S 2 LEV: CPT | Performed by: PAIN MEDICINE

## 2022-06-14 PROCEDURE — 64493 INJ PARAVERT F JNT L/S 1 LEV: CPT | Performed by: PAIN MEDICINE

## 2022-06-14 RX ORDER — LIDOCAINE HYDROCHLORIDE 10 MG/ML
10 INJECTION, SOLUTION EPIDURAL; INFILTRATION; INTRACAUDAL; PERINEURAL ONCE
Status: COMPLETED | OUTPATIENT
Start: 2022-06-14 | End: 2022-06-14

## 2022-06-14 RX ORDER — BETAMETHASONE SODIUM PHOSPHATE AND BETAMETHASONE ACETATE 3; 3 MG/ML; MG/ML
6 INJECTION, SUSPENSION INTRA-ARTICULAR; INTRALESIONAL; INTRAMUSCULAR; SOFT TISSUE ONCE
Status: DISCONTINUED | OUTPATIENT
Start: 2022-06-14 | End: 2022-06-14

## 2022-06-14 RX ADMIN — LIDOCAINE HYDROCHLORIDE 10 MG: 10 INJECTION, SOLUTION EPIDURAL; INFILTRATION; INTRACAUDAL; PERINEURAL at 14:38

## 2022-06-14 NOTE — PROGRESS NOTES
[facility]) Physicians  Neurosurgery and Pain [facility]  [address], [address] [facility] , [address] [facility], [place] [address]: [phone]  F: [phone]      [address]      Provider: Sebastián Koch DO          Patient Name: Waqar Hernandez : 1945        Date: 2022       Waqar Hernandez is here today for interventional pain management. Standard ASIPP guidelines were followed and sterile technique used. Area was cleaned with Betadine x3. Informed consent was obtained. Fluoroscopic guidance was used for this procedure. Junction of superior articular process and transverse process was identified. For L5 dorsal primary ramus groove of sacral ala and SAP of S1 was identified. Appropriate length spinal needle was used and advanced to correct anatomic location. Negative aspiration was achieved. At each site approximately 1/2cc of 1% preservative free Lidocaine was injected without difficulty. Patient tolerated the procedure well, no obvious complications occurred during the procedure. Patient was appropriately monitored and discharged home in stable condition with their usual motor strength. The patient will keep close track of pain over the next several hours and call our office tomorrow and let us know what percentage of pain relief is experienced. Post op Instructions were given to patient. Relevant and recent imaging reviewed with patient today. [x] Bilateral [] T12 [] S1      [] L1 [] S2     [] Right [] L2 [] S3      [x] L3      [] Left [x] L4         [x] L5 [] S. I. Patient had >80% pain relief following procedure with positive facet joint provocative maneuvers, schedule RF ablation.     Sebastián Koch DO

## 2022-06-28 ENCOUNTER — OFFICE VISIT (OUTPATIENT)
Dept: PAIN MANAGEMENT | Age: 77
End: 2022-06-28
Payer: MEDICARE

## 2022-06-28 DIAGNOSIS — M47.816 SPONDYLOSIS OF LUMBAR REGION WITHOUT MYELOPATHY OR RADICULOPATHY: ICD-10-CM

## 2022-06-28 PROCEDURE — 64635 DESTROY LUMB/SAC FACET JNT: CPT | Performed by: PAIN MEDICINE

## 2022-06-28 PROCEDURE — 64636 DESTROY L/S FACET JNT ADDL: CPT | Performed by: PAIN MEDICINE

## 2022-06-28 RX ORDER — BETAMETHASONE SODIUM PHOSPHATE AND BETAMETHASONE ACETATE 3; 3 MG/ML; MG/ML
6 INJECTION, SUSPENSION INTRA-ARTICULAR; INTRALESIONAL; INTRAMUSCULAR; SOFT TISSUE ONCE
Status: COMPLETED | OUTPATIENT
Start: 2022-06-28 | End: 2022-06-28

## 2022-06-28 RX ORDER — LIDOCAINE HYDROCHLORIDE 10 MG/ML
10 INJECTION, SOLUTION EPIDURAL; INFILTRATION; INTRACAUDAL; PERINEURAL ONCE
Status: COMPLETED | OUTPATIENT
Start: 2022-06-28 | End: 2022-06-28

## 2022-06-28 RX ADMIN — BETAMETHASONE SODIUM PHOSPHATE AND BETAMETHASONE ACETATE 6 MG: 3; 3 INJECTION, SUSPENSION INTRA-ARTICULAR; INTRALESIONAL; INTRAMUSCULAR; SOFT TISSUE at 15:02

## 2022-06-28 RX ADMIN — LIDOCAINE HYDROCHLORIDE 10 MG: 10 INJECTION, SOLUTION EPIDURAL; INFILTRATION; INTRACAUDAL; PERINEURAL at 15:02

## 2022-06-28 NOTE — PROGRESS NOTES
[facility] [place]) Physicians  Neurosurgery and Pain [facility]  [address] [facility], [address] [place] [address]., [place] [address]: [phone]  F: [phone]      Lumbar Radio Frequency Ablation     Provider: Lazaro Razo DO          Patient Name: Cassandra Urbina : 1945        Date: 2022      Cassandra Urbina is here today for interventional pain management. Standard ASI guidelines were followed and sterile technique used. Area was cleaned with Betadine x3. Informed consent was obtained. Fluoroscopic guidance was used for this procedure. Multiple views of fluoroscopy were used during procedure to assist with needle placement. Appropriate sized RF 10mm active tip needle was used and advance to appropriate anatomic location. There was appropriate multifidus contraction noted with motor stimulation at 2 Hz between 0.5-1.5 volts. No limb or gluteal contraction was noted taking it up to 3.5 volts. Prior to lesioning at 80 degrees Celsius for 90 seconds, approximately 0.75mg/1mg of Celestone and ½ cc of 1% preservative free Lidocaine was injected. Impedance was between 200-500 ohms during the procedure. Patient tolerated the procedure well, no obvious complications occurred during the procedure. Patient was appropriately monitored and discharged home in stable condition with their usual motor strength. Post Op instructions were given to patient.           [x] Bilateral [] T11 [] L1 [] S1     [] T12 [] L2 [] S2    [] Right  [x] L3 [] S3      [x] L4 [] S4    [] Left  [x] L5                              Lazaro Razo DO

## 2022-07-14 ENCOUNTER — OFFICE VISIT (OUTPATIENT)
Dept: PAIN MANAGEMENT | Age: 77
End: 2022-07-14
Payer: MEDICARE

## 2022-07-14 VITALS
BODY MASS INDEX: 38.49 KG/M2 | TEMPERATURE: 97.1 F | SYSTOLIC BLOOD PRESSURE: 116 MMHG | HEIGHT: 68 IN | WEIGHT: 254 LBS | DIASTOLIC BLOOD PRESSURE: 66 MMHG

## 2022-07-14 DIAGNOSIS — G89.4 CHRONIC PAIN SYNDROME: ICD-10-CM

## 2022-07-14 DIAGNOSIS — M17.11 ARTHRITIS OF KNEE, RIGHT: ICD-10-CM

## 2022-07-14 DIAGNOSIS — M17.11 PRIMARY OSTEOARTHRITIS OF RIGHT KNEE: ICD-10-CM

## 2022-07-14 DIAGNOSIS — G57.11 MERALGIA PARAESTHETICA, RIGHT: ICD-10-CM

## 2022-07-14 DIAGNOSIS — M47.816 SPONDYLOSIS OF LUMBAR REGION WITHOUT MYELOPATHY OR RADICULOPATHY: ICD-10-CM

## 2022-07-14 PROCEDURE — G8427 DOCREV CUR MEDS BY ELIG CLIN: HCPCS | Performed by: NURSE PRACTITIONER

## 2022-07-14 PROCEDURE — G8417 CALC BMI ABV UP PARAM F/U: HCPCS | Performed by: NURSE PRACTITIONER

## 2022-07-14 PROCEDURE — 1123F ACP DISCUSS/DSCN MKR DOCD: CPT | Performed by: NURSE PRACTITIONER

## 2022-07-14 PROCEDURE — 99214 OFFICE O/P EST MOD 30 MIN: CPT | Performed by: NURSE PRACTITIONER

## 2022-07-14 PROCEDURE — 1036F TOBACCO NON-USER: CPT | Performed by: NURSE PRACTITIONER

## 2022-07-14 RX ORDER — GABAPENTIN 300 MG/1
300 CAPSULE ORAL NIGHTLY
Qty: 90 CAPSULE | Refills: 0 | Status: SHIPPED | OUTPATIENT
Start: 2022-07-14 | End: 2022-09-26 | Stop reason: SDUPTHER

## 2022-07-14 RX ORDER — OXYCODONE HYDROCHLORIDE AND ACETAMINOPHEN 5; 325 MG/1; MG/1
1 TABLET ORAL 2 TIMES DAILY PRN
Qty: 60 TABLET | Refills: 0 | Status: SHIPPED | OUTPATIENT
Start: 2022-07-14 | End: 2022-08-18 | Stop reason: SDUPTHER

## 2022-07-14 ASSESSMENT — ENCOUNTER SYMPTOMS
BACK PAIN: 1
BLOOD IN STOOL: 0
SHORTNESS OF BREATH: 0

## 2022-07-14 NOTE — PROGRESS NOTES
Chencho Segura  (1945)    2022    Subjective:     Chenhco Segura is 68 y.o. male who complains today of:    Chief Complaint   Patient presents with    Back Pain    Knee Pain         Allergies:  Caffeine, Codeine, Penicillins, and Shellfish-derived products    Past Medical History:   Diagnosis Date    Chicken pox     Chronic back pain     Congenital heart disease     Mumps     Osteoarthritis     Rheumatic fever      Past Surgical History:   Procedure Laterality Date    CARDIAC SURGERY  03/10/2003, 2005    ALSO STENTS WERE PLACED. Family History   Problem Relation Age of Onset    High Blood Pressure Mother     Stroke Mother     High Blood Pressure Father      Social History     Socioeconomic History    Marital status:      Spouse name: Not on file    Number of children: Not on file    Years of education: Not on file    Highest education level: Not on file   Occupational History    Not on file   Tobacco Use    Smoking status: Former Smoker     Packs/day: 0.50     Years: 45.00     Pack years: 22.50     Types: Cigarettes     Quit date: 3/1/2005     Years since quittin.3    Smokeless tobacco: Never Used   Substance and Sexual Activity    Alcohol use: No     Alcohol/week: 0.0 standard drinks     Comment: RARELY    Drug use: Not on file    Sexual activity: Not on file   Other Topics Concern    Not on file   Social History Narrative    Not on file     Social Determinants of Health     Financial Resource Strain:     Difficulty of Paying Living Expenses: Not on file   Food Insecurity:     Worried About [address] in the Last Year: Not on file    Chery of Food in the Last Year: Not on file   Transportation Needs:     Lack of Transportation (Medical): Not on file    Lack of Transportation (Non-Medical):  Not on file   Physical Activity:     Days of Exercise per Week: Not on file    Minutes of Exercise per Session: Not on file   Stress:     Feeling of Stress : Not on file   Social Connections:     Frequency of Communication with Friends and Family: Not on file    Frequency of Social Gatherings with Friends and Family: Not on file    Attends Catholic Services: Not on file    Active Member of Clubs or Organizations: Not on file    Attends Club or Organization Meetings: Not on file    Marital Status: Not on file   Intimate Partner Violence:     Fear of Current or Ex-Partner: Not on file    Emotionally Abused: Not on file    Physically Abused: Not on file    Sexually Abused: Not on file   Housing Stability:     Unable to Pay for Housing in the Last Year: Not on file    Number of [place] in the Last Year: Not on file    Unstable Housing in the Last Year: Not on file       Current Outpatient Medications on File Prior to Visit   Medication Sig Dispense Refill    Clopidogrel Bisulfate (PLAVIX PO) Take by mouth      Handicap Placard MISC by Does not apply route Valid 9/1/19- 9/1/23 1 each 0    allopurinol (ZYLOPRIM) 100 MG tablet Take 200 mg by mouth daily      aspirin 81 MG tablet Take 81 mg by mouth daily      Omega-3 Fatty Acids (FISH OIL) 1000 MG CPDR Take 2 tablets by mouth 2 times daily      NONFORMULARY as needed Indications: METALAZONE 25MG  1 TAB PRN      omeprazole (PRILOSEC) 20 MG capsule Take 20 mg by mouth daily      ezetimibe-simvastatin (VYTORIN) 10-20 MG per tablet Take 1 tablet by mouth nightly      carvedilol (COREG) 25 MG tablet       ONE TOUCH ULTRA TEST strip   0    ONETOUCH DELICA LANCETS 18W MISC   0    metFORMIN (GLUCOPHAGE) 1000 MG tablet       montelukast (SINGULAIR) 10 MG tablet       NITROSTAT 0.4 MG SL tablet   0    potassium chloride SA (K-DUR;KLOR-CON M) 20 MEQ tablet       spironolactone (ALDACTONE) 25 MG tablet   0    torsemide (DEMADEX) 100 MG tablet        No current facility-administered medications on file prior to visit.            Pt presents today for a f/u of chronic low back and right knee pain. He feels the RFA is helping. Had pacemaker/ICD 6 months ago. Pt feels pain level and functioning improves with prescribed medications and is able to walk without cane. Pt feels that walking & cold weather aggravates the pain. Pt denies radiating numbness and tingling. Gel injections with Dr. Coco Vazquez in past.  He says he did see ortho. Cannot have MRI. He says has knee brace and cane on occasion. had cardiac valve repair in July 2020. He has tried Rt SI joint and facet joint injections in the past with relief at that time. 6/28/22 BL L345 RFA  6/14/22 BL L345 MBB  5/10/22 BL L345 MBB  8/9/21 R knee inj  4/5/21 BL gel one knees        Review of Systems   Constitutional: Negative for appetite change and fever. HENT: Negative for hearing loss. Eyes: Negative for visual disturbance. Respiratory: Negative for shortness of breath. Gastrointestinal: Negative for blood in stool. Genitourinary: Negative for difficulty urinating and hematuria. Musculoskeletal: Positive for arthralgias and back pain. Skin: Negative for rash. Neurological: Negative for facial asymmetry. Hematological: Negative for adenopathy. Psychiatric/Behavioral: Negative for self-injury. All other systems reviewed and are negative. Objective:     Vitals:  /66   Temp 97.1 °F (36.2 °C)   Ht 5' 8\" (1.727 m)   Wt 254 lb (115.2 kg)   BMI 38.62 kg/m² Pain Score:   6      Physical Exam  Vitals and nursing note reviewed. Pt is alert and oriented x 3.  Recent and remote memory is intact.  Mood, judgement and affect are normal.  No signs of distress or SOB noted.  Visualized skin intact.  Sensation intact to light touch. Decreased ROM with flexion and extension of low back.  Tender with palpation to bilateral lumbar spine with positive provacative maneuvers noted.  Negative SLR.    Pt is unable to briefly heel walk and toe walk.   Strength, balance, and coordination are diminished but functional for ambulation.  Left knee with decreased ROM.  Tenderness noted with palpation. Mild swelling noted with arthritic deformity noted.  Crepitus with ROM.  Gait antalgic.  Uses cane. Assessment:      Diagnosis Orders   1. Spondylosis of lumbar region without myelopathy or radiculopathy  oxyCODONE-acetaminophen (PERCOCET) 5-325 MG per tablet    gabapentin (NEURONTIN) 300 MG capsule   2. Meralgia paraesthetica, right  gabapentin (NEURONTIN) 300 MG capsule   3. Arthritis of knee, right  Ambulatory referral to Physical Therapy    oxyCODONE-acetaminophen (PERCOCET) 5-325 MG per tablet   4. Chronic pain syndrome  oxyCODONE-acetaminophen (PERCOCET) 5-325 MG per tablet   5. Primary osteoarthritis of right knee  Ambulatory referral to Physical Therapy    oxyCODONE-acetaminophen (PERCOCET) 5-325 MG per tablet       Plan:     Periodic Controlled Substance Monitoring: Possible medication side effects, risk of tolerance/dependence & alternative treatments discussed. ,No signs of potential drug abuse or diversion identified. ,Assessed functional status. ,Obtaining appropriate analgesic effect of treatment. (Seema Douglas, APRN - CNP)    Orders Placed This Encounter   Medications    oxyCODONE-acetaminophen (PERCOCET) 5-325 MG per tablet     Sig: Take 1 tablet by mouth 2 times daily as needed for Pain for up to 30 days. Dispense:  60 tablet     Refill:  0     Reduce doses taken as pain becomes manageable    gabapentin (NEURONTIN) 300 MG capsule     Sig: Take 1 capsule by mouth nightly for 90 days.      Dispense:  90 capsule     Refill:  0       Orders Placed This Encounter   Procedures    Ambulatory referral to Physical Therapy     Referral Priority:   Routine     Referral Type:   Eval and Treat     Referral Reason:   Specialty Services Required     Requested Specialty:   Physical Therapist     Number of Visits Requested:   1     Discussed options with the patient today. Continue Percocet and Gabapentin (90 day RX, sent last month, patient did not receive, will send again today). No UDS today, patient has been out for several days which is consistent with [place]. Will recheck near future. PT ordered for BL knees per patient request.   All questions were answered.  Pt verbalized understanding and agrees with above plan. Utox reviewed and appropriate from 6/24/21. Narcan declined 4/29/21.     Will continue medications for chronic pain as they help pt function with ADL and improve quality of life.  Discussed possible risks of opiate medication with pt, including but not limited to, constipation, nausea or vomiting, sedation, urinary retention, dependence and possible addiction. Pt agrees to use medication as directed. Pt advised to not use opiates while driving or operating heavy equipment, or in situations where pt may harm him/herself or others.  Pt is aware that while on narcotics, pt needs to be seen monthly to reassess pain and need for continued medication.  NDP reviewed. Tano Larson was reviewed.  This NP saw pt under direct supervision of Dr. Rosa Robles    Follow up:  Return in about 4 weeks (around 8/11/2022) for review meds and reassess pain.     Oscar Goodwin, APRN - CNP

## 2022-07-26 ENCOUNTER — HOSPITAL ENCOUNTER (OUTPATIENT)
Dept: PHYSICAL THERAPY | Age: 77
Setting detail: THERAPIES SERIES
Discharge: HOME | End: 2022-07-26
Payer: MEDICARE

## 2022-07-26 PROCEDURE — 97162 PT EVAL MOD COMPLEX 30 MIN: CPT

## 2022-07-26 PROCEDURE — 97110 THERAPEUTIC EXERCISES: CPT

## 2022-07-26 ASSESSMENT — PAIN DESCRIPTION - DESCRIPTORS: DESCRIPTORS: SHARP

## 2022-07-26 ASSESSMENT — PAIN DESCRIPTION - FREQUENCY: FREQUENCY: INTERMITTENT

## 2022-07-26 ASSESSMENT — PAIN SCALES - GENERAL: PAINLEVEL_OUTOF10: 2

## 2022-07-26 ASSESSMENT — PAIN DESCRIPTION - ORIENTATION: ORIENTATION: RIGHT

## 2022-07-26 ASSESSMENT — PAIN DESCRIPTION - LOCATION: LOCATION: KNEE

## 2022-07-26 ASSESSMENT — PAIN - FUNCTIONAL ASSESSMENT: PAIN_FUNCTIONAL_ASSESSMENT: PREVENTS OR INTERFERES WITH ALL ACTIVE AND SOME PASSIVE ACTIVITIES

## 2022-07-26 NOTE — PROGRESS NOTES
[address] [facility]   EVALUATION            Physical Therapy: Initial Evaluation    Patient: Sherry Gallegos (8 y.o.     male)   Examination Date:   Plan of Care Certification Period: 2022 to 22  Progress Note Counter:  (PN due 22)   :  1945    Confirmed: Yes MRN: [id number]  CSN: [id number]   Insurance: Payor: MEDICARE / Plan: MEDICARE PART A AND B / Product Type: *No Product type* /   Insurance ID: [id number] - (Medicare) Secondary Insurance (if applicable): [employer]   Referring Physician: Darryle Bones, APRN -*     PCP: Mike Meza MD Visits to Date/Visits Approved:   (BMN)    No Show/Cancelled Appts: 0      Medical Diagnosis: Unilateral primary osteoarthritis, right knee [M17.11]    Treatment Diagnosis: LBP with R knee pain and strength and ROM deficits. PERTINENT MEDICAL HISTORY   Patient Assessed for Rehabilitation Services: Yes       Medical History: Chart Reviewed: Yes   Past Medical History:   Diagnosis Date    Chicken pox     Chronic back pain     Congenital heart disease     Mumps     Osteoarthritis     Rheumatic fever      Surgical History:   Past Surgical History:   Procedure Laterality Date    CARDIAC SURGERY  03/10/2003, 2005    ALSO STENTS WERE PLACED. Medications:   Current Outpatient Medications:     oxyCODONE-acetaminophen (PERCOCET) 5-325 MG per tablet, Take 1 tablet by mouth 2 times daily as needed for Pain for up to 30 days. , Disp: 60 tablet, Rfl: 0    gabapentin (NEURONTIN) 300 MG capsule, Take 1 capsule by mouth nightly for 90 days. , Disp: 90 capsule, Rfl: 0    Clopidogrel Bisulfate (PLAVIX PO), Take by mouth, Disp: , Rfl:     Handicap Placard MISC, by Does not apply route Valid 19- 23, Disp: 1 each, Rfl: 0    allopurinol (ZYLOPRIM) 100 MG tablet, Take 200 mg by mouth daily, Disp: , Rfl:     aspirin 81 MG tablet, Take 81 mg by mouth daily, Disp: , Rfl:     Omega-3 Fatty Acids (FISH OIL) 1000 MG CPDR, Take 2 tablets by mouth 2 times daily, Disp: , Rfl:     NONFORMULARY, as needed Indications: METALAZONE 25MG  1 TAB PRN, Disp: , Rfl:     omeprazole (PRILOSEC) 20 MG capsule, Take 20 mg by mouth daily, Disp: , Rfl:     ezetimibe-simvastatin (VYTORIN) 10-20 MG per tablet, Take 1 tablet by mouth nightly, Disp: , Rfl:     carvedilol (COREG) 25 MG tablet, , Disp: , Rfl:     ONE TOUCH ULTRA TEST strip, , Disp: , Rfl: 0    ONETOUCH DELICA LANCETS 39I MISC, , Disp: , Rfl: 0    metFORMIN (GLUCOPHAGE) 1000 MG tablet, , Disp: , Rfl:     montelukast (SINGULAIR) 10 MG tablet, , Disp: , Rfl:     NITROSTAT 0.4 MG SL tablet, , Disp: , Rfl: 0    potassium chloride SA (K-DUR;KLOR-CON M) 20 MEQ tablet, , Disp: , Rfl:     spironolactone (ALDACTONE) 25 MG tablet, , Disp: , Rfl: 0    torsemide (DEMADEX) 100 MG tablet, , Disp: , Rfl:   Allergies: Caffeine, Codeine, Penicillins, and Shellfish-derived products      SUBJECTIVE EXAMINATION     History obtained from[de-identified] Patient, Chart Review,     Subjective History: Onset Date: 06/12/20  Subjective: Pt presents using st cane. Reports doesnt use cane in the house. Pt does not wear a brace on the knee anymore stating \"it wore out. \"  Pain worse with standing and walking. Able to walk about a block on a good day and barely out of the house when pain is higher.   Additional Pertinent Hx (if applicable): OA, cardiac stents, pacemaker 2005 defibrillator, replaced 2021, DM, COPD, carpel tunnel, MI x 2 2005, chronic back pain   Prior diagnostic testing[de-identified] MRI (xray=R knee moderate to severe medial patellofemoral OA)  Previous treatments prior to current episode?: Injections  Any changes to allergies, medications, or have you had any medical procedures/ER visits since your last visit?:  (codeine, shell fish, caffiene, lisinopril)  Comments: RTD 8/18/22        Pain Screening    Pain Screening  Patient Currently in Pain: Yes  Pain Level: 2 (10/10 pain R knee weekly basis)  Pain Location: Good  Standing - Static: Fair, Good  Standing - Dynamic: Fair  Single Stance R Leg: 3sec with UE assist  Single Stance L Leg: 3 sec with UE assist  Comments: 1/4 squat limited, heel raise x 5 B unable to single leg heel raise      Left AROM  Right AROM       NT       AROM RLE (degrees)  R Knee Flexion 0-145: 120  R Knee Extension 0: -5          Left Strength  Right Strength         Strength LLE  L Hip Flexion: 3+/5  L Hip Extension: 3+/5  L Hip ADduction: 3+/5  L Hip Internal Rotation: 3+/5  L Hip External Rotation: 3+/5  L Knee Flexion: 3+/5  L Knee Extension: 3+/5    Strength RLE  R Hip Flexion: 3/5, 3+/5  R Hip Extension: 3+/5, 3/5  R Hip ABduction: 3+/5, 3/5  R Hip Internal Rotation: 3+/5  R Hip External Rotation: 3+/5  R Knee Flexion: 3+/5, 3/5  R Knee Extension: 3+/5     Muscle Length/Flexibility:   Muscle Length LE  90/90 SLR (Hamstring Tightness): Hamstring flexibility -35°R, -33°L at 90/90 hip/knee position. Special Tests:   Special Tests for Knee  Valgus Stress Test (MCL injury): R (+)  Varus Stress Test (LCL injury): R (+)  Macey (Meniscus Lesion): R (-)      Outcomes Score:  Exam: LEFS 24/80=70% functional      Treatment:    Exercises:   Exercises  Exercise 1: Bike: (sport)*  Exercise 2: Ham stretch seated 30s x 3 B  Exercise 3: hip circles*  Exercise 4: ankle tbands*  Exercise 5: hip add with ball*, abd with band*  Exercise 6: T ball hamstring curl*  Exercise 9: Knee ext (table) 5#, 10 x 5\"  Exercise 20: HEP: has previous low back, LE HEP  Treatment Reasoning  Functional ability(s) targeted: Ambulating community distances, Bed mobility  Modalities:Modalities:  (CP (Pt has pacemaker/defibrillator))        Manual:  Manual Therapy  Other: (KT tape R knee chondromalacia and stability)*  Treatment Reasoning  Functional ability(s) targeted: Ambulating community distances  *Indicates exercise,modality, or manual techniques to be initiated when appropriate       ASSESSMENT     Impression: Assessment:  The pt's impairments currently limit functional abilities by 30% including his abilities to walk, stand, perform recreational activities, and perform household/work related duties without pain or limitations. Skilled PT required to address about deficits to improve over function and return to prior level of function. Body Structures, Functions, Activity Limitations Requiring Skilled Therapeutic Intervention: Decreased ROM, Decreased strength, Increased pain, Decreased posture, Decreased functional mobility , Decreased ADL status, Decreased balance    Statement of Medical Necessity: Physical Therapy is both indicated and medically necessary as outlined in the POC to increase the likelihood of meeting the functionally related goals stated below. Patient's Activity Tolerance:        Patient's rehabilitation potential/prognosis is considered to be: Good    Factors which may impact rehabilitation potential include:       Patient Education: Goals, PT Role, Plan of Care, Evaluative findings, Insurance, Home Exercise Program      GOALS   Patient Goal(s): Patient goals : Decrease pain    Short Term Goals Completed by 2 weeks Goal Status   Decrease R knee pain 50% to assist with improved functional gains. New     Long Term Goals Completed by 4-6 weeks Goal Status   Indep HEP for symptom management New   Pt demo improved overall function by reporting greater than 60% per functional survey score New   Improve B hip/knee  strength 4/5 to 4+/5 to allow patient to perfrom bed mobility with min to no assist. New   Pain-free R knee AROM 0-125 deg allowing an increase in ADL tolerance.  New   Ambulate with least restrictive device safe/Indep community distances with min to no deviations New        TREATMENT PLAN       Requires PT Follow-Up: Yes    Treatment may include any combination of the following: Strengthening, ROM, Modalities, Balance training, Functional mobility training, Gait training, Stair training, Transfer training, Manual Therapy - Soft Tissue Mobilization, Neuromuscular re-education, Home exercise program     Frequency / Duration:  Patient to be seen 2 times per week for 4-6 weeks  Plan Comment:    No estim or modalities that contraindicate use with pacemaker/defibrillator. Eval Complexity:   Decision Making: Medium Complexity  History: Personal Factors and/or Comorbidities Impacting POC: Medium  History: OA, cardiac stents, pacemaker 2005 defibrillator, replaced 2021, DM, COPD, carpel tunnel, MI x 2 2005, chronic back pain  Examination of body system(s) including body structures and functions, activity limitations, and/or participation restrictions: Medium  Exam: LEFS 24/80=70% functional  Clinical Presentation: Medium    POST-PAIN     Pain Rating (0-10 pain scale):   2-3/10  Location and pain description same as pre-treatment unless indicated. Action: [x] NA  [] Call Physician  [] Perform HEP  [] Meds as prescribed    Evaluation and patient rights have been reviewed and patient agrees with plan of care. Yes  [x]  No  []   Explain:     Collins Fall Risk Assessment  Risk Factor Scale  Score   History of Falls [x] Yes  [] No 25  0 25   Secondary Diagnosis [] Yes  [x] No 15  0 0   Ambulatory Aid [] Furniture  [x] Crutches/cane/walker  [] None/bedrest/wheelchair/nurse 30  15  0 15   IV/Heparin Lock [] Yes  [x] No 20  0 0   Gait/Transferring [] Impaired  [x] Weak  [] Normal/bedrest/immobile 20  10  0 10   Mental Status [] Forgets limitations  [] Oriented to own ability 15  0 0      Total:50     Based on the Assessment score: check the appropriate box.   []  No intervention needed   Low =   Score of 0-24  [x]  Use standard prevention interventions Moderate =  Score of 24-44   [x] Discuss fall prevention strategies   [x] Indicate moderate falls risk on eval  []  Use high risk prevention interventions High = Score of 45 and higher   [] Discuss fall prevention strategies   [] Provide supervision during treatment time      Minutes:  PT Individual Minutes  Time In: 1310  Time Out: 1400  Minutes: 50  Timed Code Treatment Minutes: 8 Minutes  Procedure Minutes:50 min eval     Timed Activity Minutes Units   Ther Ex 8 1     Electronically signed by Darell Costello PT on 7/26/22 at 2:19 PM EDT

## 2022-07-26 NOTE — PROGRESS NOTES
[address]  [address]      KHFPD:[phone]    [x] Certification  [] Recertification [x]  Plan of Care  [] Progress Note [] Discharge      Referring Provider: FRANK Johnson -CNP      From:  Darell Costello, DWAYNE   Patient: Raul Rodriguez (45 y.o. male) : 1945 Date: 2022   Medical Diagnosis: Unilateral primary osteoarthritis, right knee [M17.11]    Treatment Diagnosis: LBP with R knee pain and strength and ROM deficits. Plan of Care/Certification Expiration Date: : 22   Progress Report Period from:  2022  to 2022    Visits to Date: 1 No Show: 0 Cancelled Appts: 0    OBJECTIVE:   Short Term Goals - Time Frame for Short term goals: 2 weeks    Goals Current/Discharge status  Status   Short term goal 1: Decrease R knee pain 50% to assist with improved functional gains. Pain Screening  Patient Currently in Pain: Yes  Pain Level: 2 (10/10 pain R knee weekly basis)  Pain Location: Knee  Pain Orientation: Right  Pain Descriptors: Sharp  Pain Frequency: Intermittent  Functional Pain Assessment: Prevents or interferes with all active and some passive activities STG Goal 1 Status[de-identified] New     Long Term Goals - Time Frame for Long term goals : 4-6 weeks  Goals Current/ Discharge status Met   Long term goal 1: Indep HEP for symptom management Written HEP initiated  for symptom management  Needs progression for comprehensive program development.  LTG Goal 1 Status[de-identified] New   Long term goal 2: Pt demo improved overall function by reporting greater than 60% per functional survey score Exam: LEFS 24/80=70% functional   LTG Goal 2 Status[de-identified] New   Long term goal 3: Improve B hip/knee  strength 4/5 to 4+/5 to allow patient to perfrom bed mobility with min to no assist.  Strength LLE  L Hip Flexion: 3+/5  L Hip Extension: 3+/5  L Hip ADduction: 3+/5  L Hip Internal Rotation: 3+/5  L Hip External Rotation: 3+/5  L Knee Flexion: 3+/5  L Knee Extension: 3+/5  Strength RLE  R Hip Flexion: 3/5, 3+/5  R Hip Extension: 3+/5, 3/5  R Hip ABduction: 3+/5, 3/5  R Hip Internal Rotation: 3+/5  R Hip External Rotation: 3+/5  R Knee Flexion: 3+/5, 3/5  R Knee Extension: 3+/5  LTG Goal 3 Status[de-identified] New   Long term goal 4: Pain-free R knee AROM 0-125 deg allowing an increase in ADL tolerance. AROM RLE (degrees)  R Knee Flexion 0-145: 120  R Knee Extension 0: -5  LTG Goal 4 Status[de-identified] New   Long term goal 5: Ambulate with least restrictive device safe/Indep community distances with min to no deviations  Ambulation  Surface: carpet  Device: Single point cane  Assistance: Modified Independent  Quality of Gait: Increase lat sway  Gait Deviations: Slow Radha, Increased TORITO, Decreased step length  Distance: 50' x 1 LTG Goal 5 Status[de-identified] New     Body Structures, Functions, Activity Limitations Requiring Skilled Therapeutic Intervention: Decreased ROM, Decreased strength, Increased pain, Decreased posture, Decreased functional mobility , Decreased ADL status, Decreased balance  Assessment: The pt's impairments currently limit functional abilities by 30% including his abilities to walk, stand, perform recreational activities, and perform household/work related duties without pain or limitations. Skilled PT required to address about deficits to improve over function and return to prior level of function. Therapy Prognosis: Good    Special Test:       Special Tests for Knee  Valgus Stress Test (MCL injury): R (+)  Varus Stress Test (LCL injury): R (+)  Macey (Meniscus Lesion): R (-)        PT Education: Goals;PT Role;Plan of Care;Evaluative findings; Insurance;Home Exercise Program    PLAN: [x] Evaluate and Treat  Frequency/Duration:  Plan Frequency: 2  Plan weeks: 4-6  Current Treatment Recommendations: Strengthening, ROM, Modalities, Balance training, Functional mobility training, Gait training, Stair training, Transfer training, Manual Therapy - Soft Tissue Mobilization, Neuromuscular re-education, Home exercise program  Plan Comment: No estim or modalities that contraindicate use with pacemaker/defibrillator. Precautions:  Falls risk                          Patient Status:[x] Continue/ Initiate plan of Care     [] Discharge PT. Recommend pt continue with HEP. [] Additional visits requested, Please re-certify for additional visits:        Signature: Electronically signed by Ino Burciaga PT on 7/26/22 at 2:06 PM EDT      If you have any questions or concerns, please don't hesitate to call. Thank you for your referral.    I have reviewed this plan of care and certify a need for medically necessary rehabilitation services.     Physician Signature:__________________________________________________________  Date:  Please sign and return

## 2022-08-02 ENCOUNTER — APPOINTMENT (OUTPATIENT)
Dept: PHYSICAL THERAPY | Age: 77
End: 2022-08-02
Payer: MEDICARE

## 2022-08-04 ENCOUNTER — HOSPITAL ENCOUNTER (OUTPATIENT)
Dept: PHYSICAL THERAPY | Age: 77
Setting detail: THERAPIES SERIES
Discharge: HOME | End: 2022-08-04
Payer: MEDICARE

## 2022-08-04 PROCEDURE — 97110 THERAPEUTIC EXERCISES: CPT

## 2022-08-04 ASSESSMENT — PAIN SCALES - GENERAL: PAINLEVEL_OUTOF10: 2

## 2022-08-04 ASSESSMENT — PAIN DESCRIPTION - ORIENTATION: ORIENTATION: RIGHT

## 2022-08-04 ASSESSMENT — PAIN DESCRIPTION - PAIN TYPE: TYPE: CHRONIC PAIN

## 2022-08-04 ASSESSMENT — PAIN DESCRIPTION - LOCATION: LOCATION: KNEE

## 2022-08-04 ASSESSMENT — PAIN DESCRIPTION - DESCRIPTORS: DESCRIPTORS: ACHING

## 2022-08-04 NOTE — PROGRESS NOTES
[address] [place]  YZOVM:[phone]  Treatment Note        Date: 2022  Patient: Analilia Brian  : 1945   Confirmed: Yes  MRN: [id number]  Referring Provider: FRANK Martinez -*   Medical Diagnosis: Unilateral primary osteoarthritis, right knee [M17.11]    Treatment Diagnosis: LBP with R knee pain and strength and ROM deficits. Visit Information:  Insurance: Payor: MEDICARE / Plan: MEDICARE PART A AND B / Product Type: *No Product type* /   PT Visit Information  Onset Date: 20  Total # of Visits to Date: 2  Plan of Care/Certification Expiration Date: 22  No Show: 0  Progress Note Due Date: 03/10/22  Canceled Appointment: 0  Progress Note Counter:  (PN due 22)    Subjective Information:  Subjective: Pt entered clinic without cane. His right knee isn't too bad today. HEP Compliance:  [x] Good [] Fair [] Poor [] Reports not doing due to:    Pain Screening  Patient Currently in Pain: Yes  Pain Assessment: 0-10  Pain Level: 2  Best Pain Level: 0  Pain Type: Chronic pain  Pain Location: Knee  Pain Orientation: Right  Pain Descriptors: Aching (occasional stabbing when standing/walking)    Treatment:  Exercises:  Exercises  Exercise 1: Bike: (sport)*  Exercise 2: Ham stretch seated 30s x 3 B  Exercise 3: hip circles*  Exercise 4: ankle tbands*  Exercise 5: hip add with ball 10 x 5\", abd with BTB 10 x 5\"  Exercise 6: R ham curl with GTB 10 x 5\"  Exercise 7: R LAQ 5# 10 x 5\"  Exercise 20: HEP: has previous low back, LE HEP  Treatment Reasoning  Functional ability(s) targeted: Ambulating community distances, Bed mobility    *Indicates exercise, modality, or manual techniques to be initiated when appropriate    Strength: [x] NT  [] MMT completed:      ROM: [x] NT  [] ROM measurements:      Assessment:    Body Structures, Functions, Activity Limitations Requiring Skilled Therapeutic Intervention: Decreased ROM, Decreased strength, Increased pain, Decreased posture, Decreased functional mobility , Decreased ADL status, Decreased balance  Assessment: Initiated R knee stab therex this date. Completed ex's in seated position. Pt took instructions well with good tolerance to ex's. Treatment Diagnosis: LBP with R knee pain and strength and ROM deficits. Therapy Prognosis: Good      Post-Pain Assessment:       Pain Rating (0-10 pain scale):   2/10   Location and pain description same as pre-treatment unless indicated. Action: [x] NA   [] Perform HEP  [] Meds as prescribed  [] Modalities as prescribed   [] Call Physician     GOALS   Patient Goal(s): Patient goals : Decrease pain    Short Term Goals Completed by 2 weeks Goal Status   STG 1 Decrease R knee pain 50% to assist with improved functional gains. In progress       Long Term Goals Completed by 4-6 weeks Goal Status   LTG 1 Indep HEP for symptom management In progress   LTG 2 Pt demo improved overall function by reporting greater than 60% per functional survey score In progress   LTG 3 Improve B hip/knee  strength 4/5 to 4+/5 to allow patient to perfrom bed mobility with min to no assist. In progress   LTG 4 Pain-free R knee AROM 0-125 deg allowing an increase in ADL tolerance. In progress   LTG 5 Ambulate with least restrictive device safe/Indep community distances with min to no deviations In progress     Plan:  Frequency/Duration:  Plan  Plan Frequency: 2  Plan weeks: 4-6  Current Treatment Recommendations: Strengthening, ROM, Modalities, Balance training, Functional mobility training, Gait training, Stair training, Transfer training, Manual Therapy - Soft Tissue Mobilization, Neuromuscular re-education, Home exercise program  Plan Comment: No estim or modalities that contraindicate use with pacemaker/defibrillator. Pt to continue current HEP. See objective section for any therapeutic exercise changes, additions or modifications this date.     Therapy Time:  PT Individual Minutes  Time In: 8079  Time Out: 4969  Minutes: 39  Timed Code Treatment Minutes: 39 Minutes  Timed Activity Minutes Units   Ther Ex 39 3     Electronically signed by Griffin Garcia PTA on 8/4/22 at 2:50 PM EDT

## 2022-08-09 ENCOUNTER — HOSPITAL ENCOUNTER (OUTPATIENT)
Dept: PHYSICAL THERAPY | Age: 77
Setting detail: THERAPIES SERIES
Discharge: HOME | End: 2022-08-09
Payer: MEDICARE

## 2022-08-09 PROCEDURE — 97110 THERAPEUTIC EXERCISES: CPT

## 2022-08-09 ASSESSMENT — PAIN DESCRIPTION - ORIENTATION: ORIENTATION: RIGHT

## 2022-08-09 ASSESSMENT — PAIN DESCRIPTION - FREQUENCY: FREQUENCY: INTERMITTENT

## 2022-08-09 ASSESSMENT — PAIN DESCRIPTION - LOCATION: LOCATION: KNEE

## 2022-08-09 ASSESSMENT — PAIN DESCRIPTION - DESCRIPTORS: DESCRIPTORS: ACHING;JABBING

## 2022-08-09 ASSESSMENT — PAIN DESCRIPTION - PAIN TYPE: TYPE: CHRONIC PAIN

## 2022-08-09 ASSESSMENT — PAIN SCALES - GENERAL: PAINLEVEL_OUTOF10: 2

## 2022-08-09 NOTE — PROGRESS NOTES
[address] [facility]:[phone]  Treatment Note        Date: 2022  Patient: Hood Nevarez  : 1945   Confirmed: Yes  MRN: [id number]  Referring Provider: FRANK Mahmood CNP  Secondary Referring Provider (If applicable):     Medical Diagnosis: Unilateral primary osteoarthritis, right knee [M17.11]    Treatment Diagnosis: LBP with R knee pain and strength and ROM deficits. Visit Information:  Insurance: Payor: MEDICARE / Plan: MEDICARE PART A AND B / Product Type: *No Product type* /   PT Visit Information  Onset Date: 20  Total # of Visits to Date: 3  Plan of Care/Certification Expiration Date: 22  No Show: 0  Progress Note Due Date: 03/10/22  Canceled Appointment: 0  Progress Note Counter: 3/8 (PN due 22)    Subjective Information:  Subjective: Pt will use a straight cane for walking on uneven grounds and will use his walker when needed. TheR knee pain is not to bad today but the other day walking on uneven ground his low back and R knee had so much more pain. He stated at times the R knee feels like it will gove out on him.   HEP Compliance:  [x] Good [] Fair [] Poor [] Reports not doing due to:    Pain Screening  Patient Currently in Pain: Yes  Pain Assessment: 0-10  Pain Level: 2  Worst Pain Level: 10  Pain Type: Chronic pain  Pain Location: Knee  Pain Orientation: Right  Pain Radiating Towards: R knee bottom of knee cap into shin  Pain Descriptors: Aching, Jabbing  Pain Frequency: Intermittent  Aggravating factors: Walking, Standing    Treatment:  Exercises:  Exercises  Exercise 1: Bike: (sport) x3 mins  Exercise 2: Ham stretch seated 30s x 3 B  Exercise 5: hip add with ball 10 x 5\", abd with BTB 10 x 5\"  Exercise 6: R ham curl with GTB 10 x 5\"  Exercise 7: R LAQ 5# 10 x 5\"  Exercise 11: k-tape applied 5 mins (manual)  Exercise 20: HEP: has previous low back, LE HEP  Treatment Reasoning  Functional Pain-free R knee AROM 0-125 deg allowing an increase in ADL tolerance. In progress   LTG 5 Ambulate with least restrictive device safe/Indep community distances with min to no deviations In progress     Plan:  Frequency/Duration:  Plan  Plan Frequency: 2  Plan weeks: 4-6  Current Treatment Recommendations: Strengthening, ROM, Modalities, Balance training, Functional mobility training, Gait training, Stair training, Transfer training, Manual Therapy - Soft Tissue Mobilization, Neuromuscular re-education, Home exercise program  Plan Comment: No estim or modalities that contraindicate use with pacemaker/defibrillator. Pt to continue current HEP. See objective section for any therapeutic exercise changes, additions or modifications this date.     Therapy Time:      PT Individual Minutes  Time In: 1351  Time Out: 1440  Minutes: 49  Timed Code Treatment Minutes: 45 Minutes    Timed Activity Minutes Units   Ther Ex 40 3   Manual  5 0     Electronically signed by Malia Dan PTA on 8/9/22 at 2:55 PM EDT

## 2022-08-11 ENCOUNTER — HOSPITAL ENCOUNTER (OUTPATIENT)
Dept: PHYSICAL THERAPY | Age: 77
Setting detail: THERAPIES SERIES
Discharge: HOME | End: 2022-08-11
Payer: MEDICARE

## 2022-08-11 PROCEDURE — 97140 MANUAL THERAPY 1/> REGIONS: CPT

## 2022-08-11 PROCEDURE — 97110 THERAPEUTIC EXERCISES: CPT

## 2022-08-11 ASSESSMENT — PAIN SCALES - GENERAL: PAINLEVEL_OUTOF10: 2

## 2022-08-11 ASSESSMENT — PAIN DESCRIPTION - DESCRIPTORS: DESCRIPTORS: ACHING

## 2022-08-11 ASSESSMENT — PAIN DESCRIPTION - ORIENTATION: ORIENTATION: RIGHT

## 2022-08-11 ASSESSMENT — PAIN DESCRIPTION - LOCATION: LOCATION: KNEE

## 2022-08-11 NOTE — PROGRESS NOTES
[address] [place]  ONVSM:[phone]  Treatment Note        Date: 2022  Patient: French Torres  : 1945   Confirmed: Yes  MRN: [id number]  Referring Provider: FRANK Salas CNP  Medical Diagnosis: Unilateral primary osteoarthritis, right knee [M17.11]    Treatment Diagnosis: LBP with R knee pain and strength and ROM deficits. Visit Information:  Insurance: Payor: MEDICARE / Plan: MEDICARE PART A AND B / Product Type: *No Product type* /   PT Visit Information  Onset Date: 20  Total # of Visits to Date: 4  Plan of Care/Certification Expiration Date: 22  No Show: 0  Progress Note Due Date: 03/10/22  Canceled Appointment: 0  Progress Note Counter:  (PN due 22)    Subjective Information:  Subjective: Pt will use a straight cane for walking on uneven grounds and will use his walker when needed. TheR knee pain is not to bad today but the other day walking on uneven ground his low back and R knee had so much more pain. He stated at times the R knee feels like it will gove out on him.   HEP Compliance:  [x] Good [] Fair [] Poor [] Reports not doing due to:    Pain Screening  Patient Currently in Pain: Yes  Pain Assessment: 0-10  Pain Level: 2  Best Pain Level: 0  Worst Pain Level: 10  Pain Location: Knee  Pain Orientation: Right  Pain Descriptors: Aching    Treatment:  Exercises:  Exercises  Exercise 2: Ham stretch seated 30s x 3 B  Exercise 5: hip add with ball 10 x 5\", abd with BTB 10 x 5\"  Exercise 6: R ham curl with GTB 10 x 5\"  Exercise 7: R LAQ 5# 10 x 5\"  Exercise 8: stand heel raises x 10 , stand ALT hip abd x 10  (holding back of chair)  Exercise 11: k-tape applied 3 min (manual)  Exercise 20: HEP: has previous low back, LE HEP  Treatment Reasoning  Functional ability(s) targeted: Ambulating community distances, Bed mobility    Manual:   Manual Therapy  Other: KT tape R knee chondromalacia and stability x 3 min      *Indicates exercise, modality, or manual techniques to be initiated when appropriate      Strength: [x] NT  [] MMT completed:      ROM: [x] NT  [] ROM measurements:      Assessment: Body Structures, Functions, Activity Limitations Requiring Skilled Therapeutic Intervention: Decreased ROM, Decreased strength, Increased pain, Decreased posture, Decreased functional mobility , Decreased ADL status, Decreased balance  Assessment: Held RB per pt request d/t increased pain post last visit. Continued seated ex's and added standing heel raises and hip abd. Pt tolerated additional ex's well. Applied k-tape to R knee to offer support and pain control. Treatment Diagnosis: LBP with R knee pain and strength and ROM deficits. Therapy Prognosis: Good    Post-Pain Assessment:       Pain Rating (0-10 pain scale):   2/10   Location and pain description same as pre-treatment unless indicated. Action: [x] NA   [] Perform HEP  [] Meds as prescribed  [] Modalities as prescribed   [] Call Physician     GOALS   Patient Goal(s): Patient goals : Decrease pain    Short Term Goals Completed by 2 weeks Goal Status   STG 1 Decrease R knee pain 50% to assist with improved functional gains. In progress       Long Term Goals Completed by 4-6 weeks Goal Status   LTG 1 Indep HEP for symptom management In progress   LTG 2 Pt demo improved overall function by reporting greater than 60% per functional survey score In progress   LTG 3 Improve B hip/knee  strength 4/5 to 4+/5 to allow patient to perfrom bed mobility with min to no assist. In progress   LTG 4 Pain-free R knee AROM 0-125 deg allowing an increase in ADL tolerance.  In progress   LTG 5 Ambulate with least restrictive device safe/Indep community distances with min to no deviations In progress       Plan:  Frequency/Duration:  Plan  Plan Frequency: 2  Plan weeks: 4-6  Current Treatment Recommendations: Strengthening, ROM, Modalities, Balance training, Functional mobility training, Gait training, Stair training, Transfer training, Manual Therapy - Soft Tissue Mobilization, Neuromuscular re-education, Home exercise program  Plan Comment: No estim or modalities that contraindicate use with pacemaker/defibrillator. Pt to continue current HEP. See objective section for any therapeutic exercise changes, additions or modifications this date.     Therapy Time:  PT Individual Minutes  Time In: 2542  Time Out: 5467  Minutes: 39  Timed Code Treatment Minutes: 39 Minutes  Timed Activity Minutes Units   Ther Ex 36 2   Manual  3 1     Electronically signed by Miroslava Selby PTA on 8/11/22 at 2:50 PM EDT

## 2022-08-16 ENCOUNTER — HOSPITAL ENCOUNTER (OUTPATIENT)
Dept: PHYSICAL THERAPY | Age: 77
Setting detail: THERAPIES SERIES
Discharge: HOME | End: 2022-08-16
Payer: MEDICARE

## 2022-08-16 PROCEDURE — 97110 THERAPEUTIC EXERCISES: CPT

## 2022-08-16 NOTE — PROGRESS NOTES
[address] [place]  KZZLL:[phone]  Treatment Note        Date: 2022  Patient: Gurpreet Handler  : 1945   Confirmed: Yes  MRN: [id number]  Referring Provider: FRANK Baer CNP  Medical Diagnosis: Unilateral primary osteoarthritis, right knee [M17.11]    Treatment Diagnosis: LBP with R knee pain and strength and ROM deficits. Visit Information:  Insurance: Payor: MEDICARE / Plan: MEDICARE PART A AND B / Product Type: *No Product type* /   PT Visit Information  Onset Date: 20  Total # of Visits to Date: 5  Plan of Care/Certification Expiration Date: 22  No Show: 0  Progress Note Due Date: 03/10/22  Canceled Appointment: 0  Progress Note Counter:  (PN due 22)    Subjective Information:  Subjective: Pt states right knee is doing better. Denies pain today. HEP Compliance:  [x] Good [] Fair [] Poor [] Reports not doing due to:    Pain Screening  Patient Currently in Pain: Denies    Treatment:  Exercises:  Exercises  Exercise 2: Ham stretch seated 30s x 3 B  Exercise 5: hip add with ball 10 x 5\", abd with BTB 10 x 5\"  Exercise 6: R ham curl with GTB 10 x 5\"  Exercise 7: R LAQ 5# 10 x 5\"  Exercise 8: stand heel raises x 10 , stand ALT hip abd x 10  Exercise 20: HEP: has previous low back, LE HEP  Treatment Reasoning  Functional ability(s) targeted: Ambulating community distances, Bed mobility    *Indicates exercise, modality, or manual techniques to be initiated when appropriate      Strength: [x] NT  [] MMT completed:      ROM: [x] NT  [] ROM measurements:      Assessment: Body Structures, Functions, Activity Limitations Requiring Skilled Therapeutic Intervention: Decreased ROM, Decreased strength, Increased pain, Decreased posture, Decreased functional mobility , Decreased ADL status, Decreased balance  Assessment: Continued seated ex's and standing heel raises and hip abd. Pt tolerated therex well.  Held k-tape with no change noticed from last application. Treatment Diagnosis: LBP with R knee pain and strength and ROM deficits. Therapy Prognosis: Good      Post-Pain Assessment:       Pain Rating (0-10 pain scale):   0/10   Location and pain description same as pre-treatment unless indicated. Action: [x] NA   [] Perform HEP  [] Meds as prescribed  [] Modalities as prescribed   [] Call Physician     GOALS   Patient Goal(s): Patient goals : Decrease pain    Short Term Goals Completed by 2 weeks Goal Status   STG 1 Decrease R knee pain 50% to assist with improved functional gains. In progress       Long Term Goals Completed by 4-6 weeks Goal Status   LTG 1 Indep HEP for symptom management In progress   LTG 2 Pt demo improved overall function by reporting greater than 60% per functional survey score In progress   LTG 3 Improve B hip/knee  strength 4/5 to 4+/5 to allow patient to perfrom bed mobility with min to no assist. In progress   LTG 4 Pain-free R knee AROM 0-125 deg allowing an increase in ADL tolerance. In progress   LTG 5 Ambulate with least restrictive device safe/Indep community distances with min to no deviations In progress       Plan:  Frequency/Duration:  Plan  Plan Frequency: 2  Plan weeks: 4-6  Current Treatment Recommendations: Strengthening, ROM, Modalities, Balance training, Functional mobility training, Gait training, Stair training, Transfer training, Manual Therapy - Soft Tissue Mobilization, Neuromuscular re-education, Home exercise program  Plan Comment: No estim or modalities that contraindicate use with pacemaker/defibrillator. Pt to continue current HEP. See objective section for any therapeutic exercise changes, additions or modifications this date.     Therapy Time:  PT Individual Minutes  Time In: 1400  Time Out: 8342  Minutes: 45  Timed Code Treatment Minutes: 30 Minutes  Timed Activity Minutes Units   Ther Ex 30 2     Electronically signed by Luisa Yang PTA on 8/16/22 at 2:51 PM

## 2022-08-18 ENCOUNTER — OFFICE VISIT (OUTPATIENT)
Dept: PAIN MANAGEMENT | Age: 77
End: 2022-08-18
Payer: MEDICARE

## 2022-08-18 ENCOUNTER — HOSPITAL ENCOUNTER (OUTPATIENT)
Dept: PHYSICAL THERAPY | Age: 77
Setting detail: THERAPIES SERIES
Discharge: HOME | End: 2022-08-18
Payer: MEDICARE

## 2022-08-18 VITALS
HEIGHT: 68 IN | SYSTOLIC BLOOD PRESSURE: 128 MMHG | BODY MASS INDEX: 39.1 KG/M2 | TEMPERATURE: 98.2 F | WEIGHT: 258 LBS | DIASTOLIC BLOOD PRESSURE: 74 MMHG

## 2022-08-18 DIAGNOSIS — G89.4 CHRONIC PAIN SYNDROME: ICD-10-CM

## 2022-08-18 DIAGNOSIS — M17.11 PRIMARY OSTEOARTHRITIS OF RIGHT KNEE: ICD-10-CM

## 2022-08-18 DIAGNOSIS — M17.11 ARTHRITIS OF KNEE, RIGHT: ICD-10-CM

## 2022-08-18 DIAGNOSIS — M47.816 SPONDYLOSIS OF LUMBAR REGION WITHOUT MYELOPATHY OR RADICULOPATHY: ICD-10-CM

## 2022-08-18 PROCEDURE — G8417 CALC BMI ABV UP PARAM F/U: HCPCS | Performed by: NURSE PRACTITIONER

## 2022-08-18 PROCEDURE — G8427 DOCREV CUR MEDS BY ELIG CLIN: HCPCS | Performed by: NURSE PRACTITIONER

## 2022-08-18 PROCEDURE — 1123F ACP DISCUSS/DSCN MKR DOCD: CPT | Performed by: NURSE PRACTITIONER

## 2022-08-18 PROCEDURE — 1036F TOBACCO NON-USER: CPT | Performed by: NURSE PRACTITIONER

## 2022-08-18 PROCEDURE — 97110 THERAPEUTIC EXERCISES: CPT

## 2022-08-18 PROCEDURE — 99213 OFFICE O/P EST LOW 20 MIN: CPT | Performed by: NURSE PRACTITIONER

## 2022-08-18 RX ORDER — OXYCODONE HYDROCHLORIDE AND ACETAMINOPHEN 5; 325 MG/1; MG/1
1 TABLET ORAL 2 TIMES DAILY PRN
Qty: 60 TABLET | Refills: 0 | Status: SHIPPED | OUTPATIENT
Start: 2022-08-18 | End: 2022-09-26 | Stop reason: SDUPTHER

## 2022-08-18 ASSESSMENT — ENCOUNTER SYMPTOMS
SHORTNESS OF BREATH: 0
BLOOD IN STOOL: 0
BACK PAIN: 1

## 2022-08-18 NOTE — PROGRESS NOTES
[address] [place]  OTGQC:[phone]  Treatment Note        Date: 2022  Patient: Nikos Luther  : 1945   Confirmed: Yes  MRN: [id number]  Referring Provider: FRANK Samson CNP  Medical Diagnosis: Unilateral primary osteoarthritis, right knee [M17.11]    Treatment Diagnosis: LBP with R knee pain and strength and ROM deficits. Visit Information:  Insurance: Payor: MEDICARE / Plan: MEDICARE PART A AND B / Product Type: *No Product type* /   PT Visit Information  Onset Date: 20  Total # of Visits to Date: 6  Plan of Care/Certification Expiration Date: 22  No Show: 0  Progress Note Due Date: 03/10/22  Canceled Appointment: 0  Progress Note Counter:  (PN due 22)    Subjective Information:  Subjective: Pt states right knee is doing better and denies pain. HEP Compliance:  [x] Good [] Fair [] Poor [] Reports not doing due to:    Pain Screening  Patient Currently in Pain: Denies    Treatment:  Exercises:  Exercises  Exercise 2: Ham stretch seated 30s x 3 B  Exercise 5: hip add with ball 10 x 5\", abd with BTB 10 x 5\"  Exercise 6: R ham curl with GTB 10 x 5\"  Exercise 7: R LAQ 5# 10 x 5\"  Exercise 8: stand heel raises x 10 , stand ALT hip abd x 10 , stand ALT hip ext x 10  Exercise 20: HEP: has previous low back, LE HEP  Treatment Reasoning  Functional ability(s) targeted: Ambulating community distances, Bed mobility    *Indicates exercise, modality, or manual techniques to be initiated when appropriate    Strength: [x] NT  [] MMT completed:      ROM: [x] NT  [] ROM measurements:    Assessment: Body Structures, Functions, Activity Limitations Requiring Skilled Therapeutic Intervention: Decreased ROM, Decreased strength, Increased pain, Decreased posture, Decreased functional mobility , Decreased ADL status, Decreased balance  Assessment: Progressed with sink ex's and added hip ext this date.  Pt tolerated tx well.  Treatment Diagnosis: LBP with R knee pain and strength and ROM deficits. Therapy Prognosis: Good      Post-Pain Assessment:       Pain Rating (0-10 pain scale):   0/10   Location and pain description same as pre-treatment unless indicated. Action: [x] NA   [] Perform HEP  [] Meds as prescribed  [] Modalities as prescribed   [] Call Physician     GOALS   Patient Goal(s): Patient goals : Decrease pain    Short Term Goals Completed by 2 weeks Goal Status   STG 1 Decrease R knee pain 50% to assist with improved functional gains. In progress       Long Term Goals Completed by 4-6 weeks Goal Status   LTG 1 Indep HEP for symptom management In progress   LTG 2 Pt demo improved overall function by reporting greater than 60% per functional survey score In progress   LTG 3 Improve B hip/knee  strength 4/5 to 4+/5 to allow patient to perfrom bed mobility with min to no assist. In progress   LTG 4 Pain-free R knee AROM 0-125 deg allowing an increase in ADL tolerance. In progress   LTG 5 Ambulate with least restrictive device safe/Indep community distances with min to no deviations In progress       Plan:  Frequency/Duration:  Plan  Plan Frequency: 2  Plan weeks: 4-6  Current Treatment Recommendations: Strengthening, ROM, Modalities, Balance training, Functional mobility training, Gait training, Stair training, Transfer training, Manual Therapy - Soft Tissue Mobilization, Neuromuscular re-education, Home exercise program  Plan Comment: No estim or modalities that contraindicate use with pacemaker/defibrillator. Pt to continue current HEP. See objective section for any therapeutic exercise changes, additions or modifications this date.     Therapy Time:  PT Individual Minutes  Time In: 5168  Time Out: 7508  Minutes: 32  Timed Code Treatment Minutes: 32 Minutes  Timed Activity Minutes Units   Ther Ex 32 2     Electronically signed by Anthony Arellano PTA on 8/18/22 at 2:23 PM EDT

## 2022-08-18 NOTE — PROGRESS NOTES
Handicap Placard MISC by Does not apply route Valid 9/1/19- 9/1/23 1 each 0    allopurinol (ZYLOPRIM) 100 MG tablet Take 200 mg by mouth daily      aspirin 81 MG tablet Take 81 mg by mouth daily      Omega-3 Fatty Acids (FISH OIL) 1000 MG CPDR Take 2 tablets by mouth 2 times daily      NONFORMULARY as needed Indications: METALAZONE 25MG  1 TAB PRN      omeprazole (PRILOSEC) 20 MG capsule Take 20 mg by mouth daily      ezetimibe-simvastatin (VYTORIN) 10-20 MG per tablet Take 1 tablet by mouth nightly      carvedilol (COREG) 25 MG tablet       ONE TOUCH ULTRA TEST strip   0    ONETOUCH DELICA LANCETS 42U MISC   0    metFORMIN (GLUCOPHAGE) 1000 MG tablet       montelukast (SINGULAIR) 10 MG tablet       NITROSTAT 0.4 MG SL tablet   0    potassium chloride SA (K-DUR;KLOR-CON M) 20 MEQ tablet       spironolactone (ALDACTONE) 25 MG tablet   0    torsemide (DEMADEX) 100 MG tablet        No current facility-administered medications on file prior to visit. Pt presents today for a f/u of chronic low back and right knee pain. He started PT which has been helping more with the knee. He says he needs iron infusions, starting next week. He feels the RFA is helping. Had pacemaker/ICD 7 months ago. Pt feels pain level and functioning improves with prescribed medications and is able to walk without cane. Pt feels that walking & cold weather aggravates the pain. Pt denies radiating numbness and tingling. Gel injections with Dr. Waldo Patrick in past.  He says he did see ortho. Cannot have MRI. He says has knee brace and cane on occasion. had cardiac valve repair in July 2020. He has tried Rt SI joint and facet joint injections in the past with relief at that time. 6/28/22 BL L345 RFA  6/14/22 BL L345 MBB  5/10/22 BL L345 MBB  8/9/21 R knee inj  4/5/21 BL gel one knees        Review of Systems   Constitutional:  Negative for appetite change and fever. HENT:  Negative for hearing loss.     Eyes:  Negative for visual disturbance. Respiratory:  Negative for shortness of breath. Gastrointestinal:  Negative for blood in stool. Genitourinary:  Negative for difficulty urinating and hematuria. Musculoskeletal:  Positive for arthralgias and back pain. Skin:  Negative for rash. Neurological:  Negative for facial asymmetry. Hematological:  Negative for adenopathy. Psychiatric/Behavioral:  Negative for self-injury. All other systems reviewed and are negative. Objective:     Vitals:  /74   Temp 98.2 °F (36.8 °C)   Ht 5' 8\" (1.727 m)   Wt 258 lb (117 kg)   BMI 39.23 kg/m² Pain Score:   4      Physical Exam  Vitals and nursing note reviewed. Pt is alert and oriented x 3. Recent and remote memory is intact. Mood, judgement and affect are normal.  No signs of distress or SOB noted. Visualized skin intact. Sensation intact to light touch. Decreased ROM with flexion and extension of low back. Tender with palpation to bilateral lumbar spine with positive provacative maneuvers noted. Negative SLR. Pt is unable to briefly heel walk and toe walk. Strength, balance, and coordination are diminished but functional for ambulation. Left knee with decreased ROM. Tenderness noted with palpation. Mild swelling noted with arthritic deformity noted. Crepitus with ROM. Gait antalgic. Uses cane. Assessment:      Diagnosis Orders   1. Spondylosis of lumbar region without myelopathy or radiculopathy  oxyCODONE-acetaminophen (PERCOCET) 5-325 MG per tablet      2. Arthritis of knee, right  oxyCODONE-acetaminophen (PERCOCET) 5-325 MG per tablet      3. Chronic pain syndrome  oxyCODONE-acetaminophen (PERCOCET) 5-325 MG per tablet      4.  Primary osteoarthritis of right knee  oxyCODONE-acetaminophen (PERCOCET) 5-325 MG per tablet          Plan:     Periodic Controlled Substance Monitoring: Possible medication side effects, risk of tolerance/dependence & alternative treatments discussed., No signs of potential drug abuse or diversion identified. , Assessed functional status., Obtaining appropriate analgesic effect of treatment. (Jose Enrique Worley, APRN - CNP)    Orders Placed This Encounter   Medications    oxyCODONE-acetaminophen (PERCOCET) 5-325 MG per tablet     Sig: Take 1 tablet by mouth 2 times daily as needed for Pain for up to 30 days. Dispense:  60 tablet     Refill:  0     Reduce doses taken as pain becomes manageable       No orders of the defined types were placed in this encounter. Discussed options with the patient today. Continue PT. Continue Percocet and Gabapentin. No UDS today, patient has been out for several days which is consistent with OARRS. Will recheck near future. All questions were answered. Pt verbalized understanding and agrees with above plan. Utox reviewed and appropriate from 6/24/21. Narcan declined 4/29/21. Will continue medications for chronic pain as they help pt function with ADL and improve quality of life. Discussed possible risks of opiate medication with pt, including but not limited to, constipation, nausea or vomiting, sedation, urinary retention, dependence and possible addiction. Pt agrees to use medication as directed. Pt advised to not use opiates while driving or operating heavy equipment, or in situations where pt may harm him/herself or others. Pt is aware that while on narcotics, pt needs to be seen monthly to reassess pain and need for continued medication. NDP reviewed. OARRS was reviewed. This NP saw pt under direct supervision of Dr. Awilda Heath. Follow up:  Return in about 4 weeks (around 9/15/2022) for review meds and reassess pain.     Daralyn Aase, APRN - CNP

## 2022-08-25 ENCOUNTER — HOSPITAL ENCOUNTER (OUTPATIENT)
Dept: PHYSICAL THERAPY | Age: 77
Setting detail: THERAPIES SERIES
Discharge: HOME | End: 2022-08-25
Payer: MEDICARE

## 2022-08-25 PROCEDURE — 97110 THERAPEUTIC EXERCISES: CPT

## 2022-08-25 NOTE — PROGRESS NOTES
Efren Carlson  [address]  NOPNM:[phone]  Treatment Note        Date: 2022  Patient: Chencho Segura  : 1945   Confirmed: Yes  MRN: [id number]  Referring Provider: FRANK Noonan CNP  Medical Diagnosis: Unilateral primary osteoarthritis, right knee [M17.11]    Treatment Diagnosis: LBP with R knee pain and strength and ROM deficits. Visit Information:  Insurance: Payor: MEDICARE / Plan: MEDICARE PART A AND B / Product Type: *No Product type* /   PT Visit Information  Onset Date: 20  Total # of Visits to Date: 6  Plan of Care/Certification Expiration Date: 22  No Show: 0  Progress Note Due Date: 03/10/22  Canceled Appointment: 0  Progress Note Counter:  (PN due 22)    Subjective Information:  Subjective: Pt states right knee is doing better and denies pain. HEP Compliance:  [x] Good [] Fair [] Poor [] Reports not doing due to:      Treatment:  Exercises:  Exercises  Exercise 2: Ham stretch seated 30s x 3 B  Exercise 5: hip add with ball 10 x 5\", abd with BTB 10 x 5\"  Exercise 6: R ham curl with GTB 10 x 5\"  Exercise 7: R LAQ 5# 10 x 5\"  Exercise 8: stand heel raises x 10 , stand ALT hip abd x 10 , stand ALT hip ext x 10  Exercise 20: HEP: has previous low back, LE HEP  Treatment Reasoning  Functional ability(s) targeted: Ambulating community distances, Bed mobility    Manual:   Manual Therapy  Other: ROM / MMT x 5 min    *Indicates exercise, modality, or manual techniques to be initiated when appropriate    Strength: [] NT  [x] MMT completed:  Strength RLE  R Hip Flexion: 4-/5  R Hip Extension: 4-/5  R Hip ABduction: 4-/5  R Hip Internal Rotation: 4-/5  R Hip External Rotation: 4-/5  R Knee Flexion: 4/5  R Knee Extension: 4/5      ROM: [] NT  [x] ROM measurements:  AROM RLE (degrees)  R Knee Flexion 0-145: 118  R Knee Extension 0: 0       Assessment:    Body Structures, Functions, Activity Limitations Requiring Skilled Therapeutic Intervention: Decreased ROM, Decreased strength, Increased pain, Decreased posture, Decreased functional mobility , Decreased ADL status, Decreased balance  Assessment: Pt has benefited from R knee therapy with improvements in ROM, strength, and function. Pt is doing well with daily acitvities including ambulating community distances. Pt is independent with HEP. Treatment Diagnosis: LBP with R knee pain and strength and ROM deficits. Therapy Prognosis: Good      Post-Pain Assessment:       Pain Rating (0-10 pain scale):   0/10   Location and pain description same as pre-treatment unless indicated. Action: [x] NA   [] Perform HEP  [] Meds as prescribed  [] Modalities as prescribed   [] Call Physician     GOALS   Patient Goal(s): Patient goals : Decrease pain    Short Term Goals Completed by 2 weeks Goal Status   STG 1 Decrease R knee pain 50% to assist with improved functional gains. Met       Long Term Goals Completed by 4-6 weeks Goal Status   LTG 1 Indep HEP for symptom management Met   LTG 2 Pt demo improved overall function by reporting greater than 60% per functional survey score Not Met   LTG 3 Improve B hip/knee  strength 4/5 to 4+/5 to allow patient to perfrom bed mobility with min to no assist. Partially met   LTG 4 Pain-free R knee AROM 0-125 deg allowing an increase in ADL tolerance. Partially met   LTG 5 Ambulate with least restrictive device safe/Indep community distances with min to no deviations Met       Plan:  Frequency/Duration:  Plan  Plan Comment: DC from PT  Pt to continue current HEP. See objective section for any therapeutic exercise changes, additions or modifications this date.     Therapy Time:  PT Individual Minutes  Time In: 3356  Time Out: 1430  Minutes: 44  Timed Code Treatment Minutes: 44 Minutes  Timed Activity Minutes Units   Ther Ex 39 3   Manual  5 0     Electronically signed by Luisa Yang PTA on 8/25/22 at 1:57 PM EDT

## 2022-08-25 NOTE — PROGRESS NOTES
[address]  [address]      OUGS:[phone]    [] Certification  [] Recertification []  Plan of Care  [] Progress Note [x] Discharge      Referring Provider: FRANK Gonzalez - AMBROSIO     From: Eric Allen PT  Patient: Edgardo Mays (6 y.o. male) : 1945 Date: 2022   Medical Diagnosis: Unilateral primary osteoarthritis, right knee [M17.11]    Treatment Diagnosis: LBP with R knee pain and strength and ROM deficits. Plan of Care/Certification Expiration Date: : 22   Progress Report Period from:  2022  to 2022    Visits to Date: 6 No Show: 0 Cancelled Appts: 0    OBJECTIVE:   Short Term Goals - Time Frame for Short term goals: 2 weeks    Goals Current/Discharge status  Status   Short term goal 1: Decrease R knee pain 50% to assist with improved functional gains. Pt denies pain  Met       Long Term Goals - Time Frame for Long term goals : 4-6 weeks  Goals Current/ Discharge status Status   Long term goal 1: Indep HEP for symptom management Written HEP provided  for symptom management  Met   Long term goal 2: Pt demo improved overall function by reporting greater than 60% per functional survey score Exam: LEFS 39/80= 48% functional  Not Met   Long term goal 3: Improve B hip/knee  strength 4/5 to 4+/5 to allow patient to perfrom bed mobility with min to no assist. Strength RLE  R Hip Flexion: 4-/5  R Hip Extension: 4-/5  R Hip ABduction: 4-/5  R Hip Internal Rotation: 4-/5  R Hip External Rotation: 4-/5  R Knee Flexion: 4/5  R Knee Extension: 4/5 Partially met   Long term goal 4: Pain-free R knee AROM 0-125 deg allowing an increase in ADL tolerance.  AROM RLE (degrees)  R Knee Flexion 0-145: 118  R Knee Extension 0: 0  Partially met   Long term goal 5: Ambulate with least restrictive device safe/Indep community distances with min to no deviations Pt ambulating without assistive device community distances  Met       Body Structures, Functions, Activity Limitations Requiring Skilled Therapeutic Intervention: Decreased ROM, Decreased strength, Increased pain, Decreased posture, Decreased functional mobility , Decreased ADL status, Decreased balance  Assessment: Pt has benefited from R knee therapy with improvements in ROM, strength, and function. Has reached plateau with ex's and has met or partially met 5 of 6 goals. Pt is doing well with daily acitvities including ambulating community distances. Pt is independent with HEP. Therapy Prognosis: Good      PLAN: [x] Discharge   Frequency/Duration:  Plan Comment: DC from PT                          Patient Status:[] Continue/ Initiate plan of Care     [x] Discharge PT. Recommend pt continue with HEP. [] Additional visits requested, Please re-certify for additional visits:     [] Hold     Objective information provided by: Electronically signed by Silvestre Drummond PTA on 8/25/22 at 2:46 PM EDT        Signature: Electronically signed by Laila Olvera PT on 8/26/2022 at 3:10 PM      If you have any questions or concerns, please don't hesitate to call. Thank you for your referral.    I have reviewed this plan of care and certify a need for medically necessary rehabilitation services.     Physician Signature:__________________________________________________________  Date:  Please sign and return

## 2022-08-30 ENCOUNTER — APPOINTMENT (OUTPATIENT)
Dept: PHYSICAL THERAPY | Age: 77
End: 2022-08-30
Payer: MEDICARE

## 2022-09-14 DIAGNOSIS — M47.816 SPONDYLOSIS OF LUMBAR REGION WITHOUT MYELOPATHY OR RADICULOPATHY: ICD-10-CM

## 2022-09-14 DIAGNOSIS — G57.11 MERALGIA PARAESTHETICA, RIGHT: ICD-10-CM

## 2022-09-14 RX ORDER — GABAPENTIN 300 MG/1
CAPSULE ORAL
Qty: 90 CAPSULE | Refills: 3 | OUTPATIENT
Start: 2022-09-14

## 2022-09-20 DIAGNOSIS — M47.816 SPONDYLOSIS OF LUMBAR REGION WITHOUT MYELOPATHY OR RADICULOPATHY: ICD-10-CM

## 2022-09-20 DIAGNOSIS — G57.11 MERALGIA PARAESTHETICA, RIGHT: ICD-10-CM

## 2022-09-20 RX ORDER — GABAPENTIN 300 MG/1
CAPSULE ORAL
Qty: 90 CAPSULE | Refills: 3 | OUTPATIENT
Start: 2022-09-20

## 2022-09-26 ENCOUNTER — OFFICE VISIT (OUTPATIENT)
Dept: PAIN MANAGEMENT | Age: 77
End: 2022-09-26
Payer: MEDICARE

## 2022-09-26 VITALS
DIASTOLIC BLOOD PRESSURE: 78 MMHG | TEMPERATURE: 96.9 F | HEIGHT: 69 IN | SYSTOLIC BLOOD PRESSURE: 126 MMHG | BODY MASS INDEX: 37.62 KG/M2 | WEIGHT: 254 LBS

## 2022-09-26 DIAGNOSIS — G57.11 MERALGIA PARAESTHETICA, RIGHT: ICD-10-CM

## 2022-09-26 DIAGNOSIS — M17.11 ARTHRITIS OF KNEE, RIGHT: ICD-10-CM

## 2022-09-26 DIAGNOSIS — M47.816 SPONDYLOSIS OF LUMBAR REGION WITHOUT MYELOPATHY OR RADICULOPATHY: Primary | ICD-10-CM

## 2022-09-26 DIAGNOSIS — M17.11 PRIMARY OSTEOARTHRITIS OF RIGHT KNEE: ICD-10-CM

## 2022-09-26 DIAGNOSIS — G89.4 CHRONIC PAIN SYNDROME: ICD-10-CM

## 2022-09-26 PROCEDURE — 1036F TOBACCO NON-USER: CPT | Performed by: NURSE PRACTITIONER

## 2022-09-26 PROCEDURE — G8427 DOCREV CUR MEDS BY ELIG CLIN: HCPCS | Performed by: NURSE PRACTITIONER

## 2022-09-26 PROCEDURE — 99214 OFFICE O/P EST MOD 30 MIN: CPT | Performed by: NURSE PRACTITIONER

## 2022-09-26 PROCEDURE — 1123F ACP DISCUSS/DSCN MKR DOCD: CPT | Performed by: NURSE PRACTITIONER

## 2022-09-26 PROCEDURE — G8417 CALC BMI ABV UP PARAM F/U: HCPCS | Performed by: NURSE PRACTITIONER

## 2022-09-26 RX ORDER — OXYCODONE HYDROCHLORIDE AND ACETAMINOPHEN 5; 325 MG/1; MG/1
1 TABLET ORAL 2 TIMES DAILY PRN
Qty: 60 TABLET | Refills: 0 | Status: SHIPPED | OUTPATIENT
Start: 2022-09-26 | End: 2022-10-26

## 2022-09-26 RX ORDER — GABAPENTIN 300 MG/1
300 CAPSULE ORAL NIGHTLY
Qty: 90 CAPSULE | Refills: 0 | Status: SHIPPED | OUTPATIENT
Start: 2022-10-12 | End: 2023-01-10

## 2022-09-26 ASSESSMENT — ENCOUNTER SYMPTOMS
GASTROINTESTINAL NEGATIVE: 1
BACK PAIN: 1
NAUSEA: 0
EYES NEGATIVE: 1
SHORTNESS OF BREATH: 0
CONSTIPATION: 0
COUGH: 0
DIARRHEA: 0

## 2022-09-26 NOTE — PROGRESS NOTES
Po Middleton  (1945)    2022    Subjective:     Po Middleton is 68 y.o. male who complains today of:    Chief Complaint   Patient presents with    Knee Pain     Both knees    Back Pain         Allergies:  Caffeine, Codeine, Penicillins, and Shellfish-derived products    Past Medical History:   Diagnosis Date    Chicken pox     Chronic back pain     Congenital heart disease     Mumps     Osteoarthritis     Rheumatic fever      Past Surgical History:   Procedure Laterality Date    CARDIAC SURGERY  03/10/2003, 2005    ALSO STENTS WERE PLACED.       Family History   Problem Relation Age of Onset    High Blood Pressure Mother     Stroke Mother     High Blood Pressure Father      Social History     Socioeconomic History    Marital status:      Spouse name: Not on file    Number of children: Not on file    Years of education: Not on file    Highest education level: Not on file   Occupational History    Not on file   Tobacco Use    Smoking status: Former     Packs/day: 0.50     Years: 45.00     Pack years: 22.50     Types: Cigarettes     Quit date: 3/1/2005     Years since quittin.5    Smokeless tobacco: Never   Substance and Sexual Activity    Alcohol use: No     Alcohol/week: 0.0 standard drinks     Comment: RARELY    Drug use: Not on file    Sexual activity: Not on file   Other Topics Concern    Not on file   Social History Narrative    Not on file     Social Determinants of Health     Financial Resource Strain: Not on file   Food Insecurity: Not on file   Transportation Needs: Not on file   Physical Activity: Not on file   Stress: Not on file   Social Connections: Not on file   Intimate Partner Violence: Not on file   Housing Stability: Not on file       Current Outpatient Medications on File Prior to Visit   Medication Sig Dispense Refill    Clopidogrel Bisulfate (PLAVIX PO) Take by mouth      Handicap Placard MISC by Does not apply route Valid 19- 23 1 each 0 allopurinol (ZYLOPRIM) 100 MG tablet Take 200 mg by mouth daily      aspirin 81 MG tablet Take 81 mg by mouth daily      Omega-3 Fatty Acids (FISH OIL) 1000 MG CPDR Take 2 tablets by mouth 2 times daily      NONFORMULARY as needed Indications: METALAZONE 25MG  1 TAB PRN      omeprazole (PRILOSEC) 20 MG capsule Take 20 mg by mouth daily      ezetimibe-simvastatin (VYTORIN) 10-20 MG per tablet Take 1 tablet by mouth nightly      carvedilol (COREG) 25 MG tablet       ONE TOUCH ULTRA TEST strip   0    ONETOUCH DELICA LANCETS 87A MISC   0    metFORMIN (GLUCOPHAGE) 1000 MG tablet       montelukast (SINGULAIR) 10 MG tablet       NITROSTAT 0.4 MG SL tablet   0    potassium chloride SA (K-DUR;KLOR-CON M) 20 MEQ tablet       spironolactone (ALDACTONE) 25 MG tablet   0    torsemide (DEMADEX) 100 MG tablet        No current facility-administered medications on file prior to visit. Pt presents today for a f/u of his pain. PCP is Dr. Savannah Su MD.  Pt last saw Deloris Finn NP. Diagnostic L3-4-5 MBB's have been ordered in the past.  He had RF ablation on 6/28/2022 bilateral L3-4-5 with Dr. Elsa Jose. He says this allowed him to stand for longer than prior to shot and says pain cut by 50%. Evidently he is needing iron infusions. Been doing PT for his knees without much relief. Hx of upper GI bleed. He has chronic low back and right knee pain. Had pacemaker/ICD 4 months ago. Says he needs re-evaluated for PT. Was previously going twice a week. Gel injections with Dr. Elyssa Dempsey in past which did help back in April of 2021. He says he did see ortho. Cannot have MRI. He says has knee brace and cane on occasion. had cardiac valve repair in July 2020. He has tried Rt SI joint and facet joint injections in the past with relief at that time. Past injections:  8/9/21 R knee inj  4/5/21 BL gel one knees. He says he did see ortho. Cannot have MRI. He says has knee brace. Using cane.     He is now using his Rolator or cane at times. He reports he had cardiac valve repair in July. He has tried Rt SI joint injections and B/L L3-4, L4-5, L5-S1 facet joint injections in the past with relief at that time. Flexion and extension XR of low back from 2019 shows Grade 1 spondylolisthesis per report. He typically uses his Percocet 5mg BID PRN pain and uses gabapentin at night only     Pt feels pain level with his medication is 6/10, and worse without medication. Pt feels that walking, standing makes the pain worse, and sitting, gel one injections in the past for knees, RF ablation, medication makes the pain better. Pt feels his medication helps   him function and improve his quality of life, specifically says allows to work. Pt denies radiating numbness and tingling. Denies recent falls, injuries or trauma. Pt denies new weakness. Pt reports PT has been done. Review of Systems   Constitutional:  Negative for fever. HENT: Negative. Eyes: Negative. Respiratory:  Negative for cough and shortness of breath. Cardiovascular:  Negative for chest pain. Gastrointestinal: Negative. Negative for constipation, diarrhea and nausea. Endocrine: Negative. Genitourinary: Negative. Musculoskeletal:  Positive for arthralgias and back pain. Skin: Negative. Neurological:  Negative for dizziness and weakness. Psychiatric/Behavioral: Negative. Objective:     Vitals:  /78   Temp 96.9 °F (36.1 °C)   Ht 5' 8.5\" (1.74 m)   Wt 254 lb (115.2 kg)   BMI 38.06 kg/m² Pain Score:   7      Physical Exam  Vitals and nursing note reviewed. This is a pleasant obese male who answers questions appropriately and follows commands. Pt is alert and oriented x 3. Recent and remote memory is intact. Mood and affect, judgement and insight are normal.  No signs of distress, no dyspnea or SOB noted. HEENT: PERRL. Decreased ROM with flexion and extension of low back.  Mild tenderness with palpation to lumbar spine.  Negative SLR but reproduces low back pain. Tightness in both hamstrings noted. B/L knees with decreased ROM. Arthritic deformity noted. Crepitus with ROM. Balance and coordination normal.  Strength is functional for ambulation. Slow with position changes. Using cane today. Cranial nerves II-XII are intact. Assessment:      Diagnosis Orders   1. Spondylosis of lumbar region without myelopathy or radiculopathy  oxyCODONE-acetaminophen (PERCOCET) 5-325 MG per tablet    gabapentin (NEURONTIN) 300 MG capsule    Urine Drug Screen      2. Arthritis of knee, right  oxyCODONE-acetaminophen (PERCOCET) 5-325 MG per tablet    Urine Drug Screen      3. Chronic pain syndrome  oxyCODONE-acetaminophen (PERCOCET) 5-325 MG per tablet    Urine Drug Screen      4. Primary osteoarthritis of right knee  oxyCODONE-acetaminophen (PERCOCET) 5-325 MG per tablet    Urine Drug Screen      5. Meralgia paraesthetica, right  gabapentin (NEURONTIN) 300 MG capsule    Urine Drug Screen          Plan:     Periodic Controlled Substance Monitoring: Possible medication side effects, risk of tolerance/dependence & alternative treatments discussed., No signs of potential drug abuse or diversion identified. , Assessed functional status., Obtaining appropriate analgesic effect of treatment. (Carolina Alexis, APRN - CNP)    Orders Placed This Encounter   Medications    oxyCODONE-acetaminophen (PERCOCET) 5-325 MG per tablet     Sig: Take 1 tablet by mouth 2 times daily as needed for Pain for up to 30 days. Dispense:  60 tablet     Refill:  0     Reduce doses taken as pain becomes manageable    gabapentin (NEURONTIN) 300 MG capsule     Sig: Take 1 capsule by mouth nightly for 90 days. Dispense:  90 capsule     Refill:  0       Orders Placed This Encounter   Procedures    Urine Drug Screen     Chronic pain/illicits     Discussed options with the patient today. Anatomic model pathology was shown and reviewed with pt.  He questions about electric scooter. Encourage patient to walk and follow-up with Dr. Keyon Miller for possible repeat of gel 1 injections. Pleased his RF ablation offered significant relief. Completed PT for her knees. Refilled gabapentin daily for 90-day supply 300 mg and Percocet 5 mg twice daily as needed pain as it helps him function. He will discuss possible scooter with PCP. All questions were answered. Discussed home exercise program.  Relevant imaging and pain generators reviewed. Pt verbalized understanding and agrees with above plan. Pt has chronic pain. Utox reviewed and appropriate from June 2021. ORT score 0 - low risk. Narcan Rx sent in April 2021. Will continue medications for chronic pain that has been previously directed as they do help pt function with ADL and improve quality of life. Pt is aware goal is to use the least amount of medication possible to allow pt to function and help with quality of life as discussed in detail. Ideal to keep MME below 50 which it is. Have discussed proper disposal of narcotic medication. Advised to have medications in safe place and locked up/in lock box. Discussed possible risks of opiate medication with pt, including, but not limited to possible constipation, nausea or vomiting, sedation, urinary retention, dependence and possible addiction. Pt agrees to use medication as prescribed/as directed. Pt advised to not use opiates while driving or operating heavy equipment, or in situations where pt may harm him/herself or others. Pt is aware that while on narcotics, pt needs to be seen to reassess pain and reassess need for continued medication at that time. NDP reviewed. OARRS was reviewed. This NP saw pt under direct supervision of Dr. Jorge Luis Arnold. Follow up:  Return in about 4 weeks (around 10/24/2022) for review meds and reassess pain.     Selma Corley, APRN - CNP

## 2022-09-30 ENCOUNTER — OFFICE VISIT (OUTPATIENT)
Dept: SPORTS MEDICINE | Age: 77
End: 2022-09-30
Payer: MEDICARE

## 2022-09-30 VITALS — TEMPERATURE: 96.6 F | HEIGHT: 69 IN | BODY MASS INDEX: 39.01 KG/M2 | WEIGHT: 263.4 LBS

## 2022-09-30 DIAGNOSIS — M17.0 PRIMARY OSTEOARTHRITIS OF BOTH KNEES: Primary | ICD-10-CM

## 2022-09-30 PROCEDURE — 20610 DRAIN/INJ JOINT/BURSA W/O US: CPT | Performed by: FAMILY MEDICINE

## 2022-09-30 PROCEDURE — 99999 PR OFFICE/OUTPT VISIT,PROCEDURE ONLY: CPT | Performed by: FAMILY MEDICINE

## 2022-09-30 RX ORDER — BUPIVACAINE HYDROCHLORIDE 5 MG/ML
6 INJECTION, SOLUTION EPIDURAL; INTRACAUDAL ONCE
Status: COMPLETED | OUTPATIENT
Start: 2022-09-30 | End: 2022-09-30

## 2022-09-30 RX ORDER — BETAMETHASONE SODIUM PHOSPHATE AND BETAMETHASONE ACETATE 3; 3 MG/ML; MG/ML
24 INJECTION, SUSPENSION INTRA-ARTICULAR; INTRALESIONAL; INTRAMUSCULAR; SOFT TISSUE ONCE
Status: COMPLETED | OUTPATIENT
Start: 2022-09-30 | End: 2022-09-30

## 2022-09-30 RX ORDER — GLIMEPIRIDE 2 MG/1
TABLET ORAL
COMMUNITY
Start: 2022-09-07

## 2022-09-30 RX ORDER — LIDOCAINE HYDROCHLORIDE 10 MG/ML
6 INJECTION, SOLUTION INFILTRATION; PERINEURAL ONCE
Status: COMPLETED | OUTPATIENT
Start: 2022-09-30 | End: 2022-09-30

## 2022-09-30 RX ADMIN — LIDOCAINE HYDROCHLORIDE 6 ML: 10 INJECTION, SOLUTION INFILTRATION; PERINEURAL at 14:15

## 2022-09-30 RX ADMIN — BUPIVACAINE HYDROCHLORIDE 30 MG: 5 INJECTION, SOLUTION EPIDURAL; INTRACAUDAL at 13:45

## 2022-09-30 RX ADMIN — BETAMETHASONE SODIUM PHOSPHATE AND BETAMETHASONE ACETATE 24 MG: 3; 3 INJECTION, SUSPENSION INTRA-ARTICULAR; INTRALESIONAL; INTRAMUSCULAR; SOFT TISSUE at 13:45

## 2022-09-30 NOTE — PROGRESS NOTES
@[facility]@    Spine Surgery  Advanced Pain Management           Provider: Magda Monroe DO          Patient Name: Angel Garza : 1945         Date: 22          PROCEDURE:  Knee Injection  Dx bilateral DJD knees    After informed consent patient was put in a seated position,patient was put in a seated position the bilateral  Knees was exposed and draped. The knee joint was identified and prepped with Betadine. A 22g needle was used to inject 2cc celestone, 3cc lidocaine, and 3cc marcaine in each knee with relief of symptoms.   Pt tolerated procedure well, left stable, told to ice the knee today and resume normal activity in 2 days

## 2022-10-04 ENCOUNTER — TELEPHONE (OUTPATIENT)
Dept: PAIN MANAGEMENT | Age: 77
End: 2022-10-04

## 2022-10-04 NOTE — TELEPHONE ENCOUNTER
PATIENT IS AWARE OF RESULTS AND WILL DISCUSS AT FOLLOW UP. STATED HE ONLY TAKES WHAT WE GIVE HIM AND NOTHING ELSE.

## 2022-10-04 NOTE — TELEPHONE ENCOUNTER
----- Message from [employer] FRANK Fang - CNP sent at 10/4/2022 12:09 PM EDT -----  U tox positive for oxycodone and gabapentin, also positive for codeine, hydrocodone, morphine. He is only given gabapentin and Percocet from this office. Please advise patient no more narcotics can given.

## 2022-10-21 ENCOUNTER — PROCEDURE VISIT (OUTPATIENT)
Dept: SPORTS MEDICINE | Age: 77
End: 2022-10-21
Payer: MEDICARE

## 2022-10-21 DIAGNOSIS — M17.0 PRIMARY OSTEOARTHRITIS OF BOTH KNEES: Primary | ICD-10-CM

## 2022-10-21 PROCEDURE — 99999 PR OFFICE/OUTPT VISIT,PROCEDURE ONLY: CPT | Performed by: FAMILY MEDICINE

## 2022-10-21 PROCEDURE — 20611 DRAIN/INJ JOINT/BURSA W/US: CPT | Performed by: FAMILY MEDICINE

## 2022-10-21 NOTE — PROGRESS NOTES
@[facility]@   Spine Surgery  Advanced Pain Management           Provider: Pradeep Granda DO          Patient Name: Donald Coley : 1945         Date: 10/21/22          PROCEDURE:  US Knee Injection  Dx DJD bilateral knees    After informed consent the patient was put in a supine position the bilateral  knee was exposed and draped. Using msk US the knee joint was identified and prepped with Betadine. A 22g US guided needle was used to inject 3ml Durolane in each knee with relief of symptoms.   Pt tolerated procedure well, left stable, told to ice the knee today and resume normal activity in 2 days  US images of procedure were saved

## 2022-11-15 ENCOUNTER — OFFICE VISIT (OUTPATIENT)
Dept: NEUROSURGERY | Age: 77
End: 2022-11-15
Payer: MEDICARE

## 2022-11-15 VITALS
WEIGHT: 248 LBS | BODY MASS INDEX: 36.73 KG/M2 | TEMPERATURE: 96.9 F | SYSTOLIC BLOOD PRESSURE: 128 MMHG | DIASTOLIC BLOOD PRESSURE: 76 MMHG | HEIGHT: 69 IN

## 2022-11-15 DIAGNOSIS — M47.816 SPONDYLOSIS OF LUMBAR REGION WITHOUT MYELOPATHY OR RADICULOPATHY: Primary | ICD-10-CM

## 2022-11-15 DIAGNOSIS — M17.11 PRIMARY OSTEOARTHRITIS OF RIGHT KNEE: ICD-10-CM

## 2022-11-15 PROCEDURE — G8427 DOCREV CUR MEDS BY ELIG CLIN: HCPCS | Performed by: NURSE PRACTITIONER

## 2022-11-15 PROCEDURE — 99213 OFFICE O/P EST LOW 20 MIN: CPT | Performed by: NURSE PRACTITIONER

## 2022-11-15 PROCEDURE — G8484 FLU IMMUNIZE NO ADMIN: HCPCS | Performed by: NURSE PRACTITIONER

## 2022-11-15 PROCEDURE — G8417 CALC BMI ABV UP PARAM F/U: HCPCS | Performed by: NURSE PRACTITIONER

## 2022-11-15 PROCEDURE — 1036F TOBACCO NON-USER: CPT | Performed by: NURSE PRACTITIONER

## 2022-11-15 PROCEDURE — 1123F ACP DISCUSS/DSCN MKR DOCD: CPT | Performed by: NURSE PRACTITIONER

## 2022-11-15 RX ORDER — LANOLIN ALCOHOL/MO/W.PET/CERES
CREAM (GRAM) TOPICAL
COMMUNITY

## 2022-11-15 RX ORDER — PANTOPRAZOLE SODIUM 40 MG/1
TABLET, DELAYED RELEASE ORAL
COMMUNITY
Start: 2022-09-20

## 2022-11-15 RX ORDER — MOMETASONE FUROATE 50 UG/1
SPRAY, METERED NASAL
COMMUNITY
Start: 2021-07-26

## 2022-11-15 RX ORDER — LORATADINE 10 MG/1
TABLET ORAL
COMMUNITY
Start: 2022-10-03

## 2022-11-15 RX ORDER — RAMELTEON 8 MG/1
TABLET ORAL
COMMUNITY
Start: 2022-04-12

## 2022-11-15 RX ORDER — LOSARTAN POTASSIUM 25 MG/1
TABLET ORAL
COMMUNITY
Start: 2022-09-20

## 2022-11-15 RX ORDER — POLYETHYLENE GLYCOL 3350 17 G/17G
17 POWDER, FOR SOLUTION ORAL
COMMUNITY

## 2022-11-15 RX ORDER — TRIAMCINOLONE ACETONIDE 1 MG/G
CREAM TOPICAL
COMMUNITY
Start: 2021-11-01 | End: 2023-02-25

## 2022-11-15 ASSESSMENT — ENCOUNTER SYMPTOMS
CONSTIPATION: 0
BACK PAIN: 1
DIARRHEA: 0
RESPIRATORY NEGATIVE: 1

## 2022-11-15 NOTE — PROGRESS NOTES
Patient: Heriberto Client  YOB: 1945  Date: 11/15/22        Subjective:     Heriberto Client is a 68 y.o. male who complains today of:    Chief Complaint   Patient presents with    Back Pain    Knee Pain         Allergies:  Caffeine, Codeine, Penicillins, and Shellfish-derived products    Past Medical History:   Diagnosis Date    Chicken pox     Chronic back pain     Congenital heart disease     Mumps     Osteoarthritis     Rheumatic fever      Past Surgical History:   Procedure Laterality Date    CARDIAC SURGERY  03/10/2003, 2005    ALSO STENTS WERE PLACED.       Family History   Problem Relation Age of Onset    High Blood Pressure Mother     Stroke Mother     High Blood Pressure Father      Social History     Socioeconomic History    Marital status:      Spouse name: Not on file    Number of children: Not on file    Years of education: Not on file    Highest education level: Not on file   Occupational History    Not on file   Tobacco Use    Smoking status: Former     Packs/day: 0.50     Years: 45.00     Pack years: 22.50     Types: Cigarettes     Quit date: 3/1/2005     Years since quittin.7    Smokeless tobacco: Never   Substance and Sexual Activity    Alcohol use: No     Alcohol/week: 0.0 standard drinks     Comment: RARELY    Drug use: Not on file    Sexual activity: Not on file   Other Topics Concern    Not on file   Social History Narrative    Not on file     Social Determinants of Health     Financial Resource Strain: Not on file   Food Insecurity: Not on file   Transportation Needs: Not on file   Physical Activity: Not on file   Stress: Not on file   Social Connections: Not on file   Intimate Partner Violence: Not on file   Housing Stability: Not on file       Current Outpatient Medications on File Prior to Visit   Medication Sig Dispense Refill    mometasone (NASONEX) 50 MCG/ACT nasal spray by Nasal route      triamcinolone (KENALOG) 0.1 % cream Apply to affected areas twice daily for two weeks then take one week break. Repeat      loratadine (CLARITIN) 10 MG tablet take 1 tablet by mouth once daily      losartan (COZAAR) 25 MG tablet       melatonin 3 MG TABS tablet Take by mouth      pantoprazole (PROTONIX) 40 MG tablet       ramelteon (ROZEREM) 8 MG tablet Take by mouth      polyethylene glycol (GLYCOLAX) 17 GM/SCOOP powder Take 17 g by mouth      glimepiride (AMARYL) 2 MG tablet       Handicap Placard MISC by Does not apply route Valid 9/1/19- 9/1/23 1 each 0    allopurinol (ZYLOPRIM) 100 MG tablet Take 200 mg by mouth daily      NONFORMULARY as needed Indications: METALAZONE 25MG  1 TAB PRN      omeprazole (PRILOSEC) 20 MG capsule Take 20 mg by mouth daily      ezetimibe-simvastatin (VYTORIN) 10-20 MG per tablet Take 1 tablet by mouth nightly      carvedilol (COREG) 25 MG tablet       ONE TOUCH ULTRA TEST strip   0    ONETOUCH DELICA LANCETS 60I MISC   0    montelukast (SINGULAIR) 10 MG tablet       NITROSTAT 0.4 MG SL tablet   0    spironolactone (ALDACTONE) 25 MG tablet   0    torsemide (DEMADEX) 100 MG tablet        No current facility-administered medications on file prior to visit. 77-year-old male is here today following for chronic low back and bilateral pain. He is here specifically today due to his urine drug screen which came back abnormal.  Please see note in epic. He has not been on his narcotic for 2 weeks since we discontinued narcotics due to diversion. I showed him the test results and he cannot explain the abnormality. He had a discussion with his primary care physician and he wants him to go to [facility] pain center and apparently has always wanted him to do that anyways. She had had gel injections with Dr. Loi Castillo last month that helped significantly. Recent lumbar radiofrequency ablation and does not currently need that procedure. Review of Systems   Constitutional: Negative.   Negative for activity change and unexpected weight change. HENT: Negative. Negative for hearing loss. Respiratory: Negative. Cardiovascular:  Negative for leg swelling. Gastrointestinal:  Negative for constipation and diarrhea. Genitourinary: Negative. Musculoskeletal:  Positive for arthralgias and back pain. Negative for gait problem, joint swelling and neck pain. Skin:  Negative for rash. Neurological:  Negative for dizziness, weakness, numbness and headaches. Psychiatric/Behavioral: Negative. Negative for sleep disturbance. Objective:     Vitals:  /76 (Site: Left Upper Arm)   Temp 96.9 °F (36.1 °C) (Temporal)   Ht 5' 8.5\" (1.74 m)   Wt 248 lb (112.5 kg)   BMI 37.16 kg/m² Pain Score:   8    Physical Exam  Vitals reviewed. Constitutional:       Appearance: He is well-developed. HENT:      Head: Normocephalic. Nose: Nose normal.   Pulmonary:      Effort: Pulmonary effort is normal.   Musculoskeletal:      Cervical back: Normal range of motion and neck supple. Lumbar back: Tenderness present. Decreased range of motion. Negative right straight leg raise test and negative left straight leg raise test.      Comments: Walks with cane   Lymphadenopathy:      Cervical: No cervical adenopathy. Skin:     General: Skin is warm and dry. Findings: No erythema or rash. Neurological:      Mental Status: He is alert and oriented to person, place, and time. Cranial Nerves: No cranial nerve deficit. Deep Tendon Reflexes: Reflexes are normal and symmetric. Reflexes normal.   Psychiatric:         Mood and Affect: Mood normal.         Behavior: Behavior normal.         Thought Content: Thought content normal.         Judgment: Judgment normal.          Assessment:        Diagnosis Orders   1. Spondylosis of lumbar region without myelopathy or radiculopathy        2. Primary osteoarthritis of right knee            Plan:          No orders of the defined types were placed in this encounter.       No orders of the defined types were placed in this encounter.    -Patient will be going to [facility] pain center per recommendation from PCP. I offered to repeat the urine today but he said he cannot. He has been out of his meds for 2 weeks so no weaning needed      [place]AR[place] was reviewed. UTOX from 9/2022 +oxycodone, gabapentin prescribed also for codeine, hydrocodone, and morphine not prescribed appropriate; ORT score = 0  Narcan prescribed 4/2021 and understands the proper use in the event of an overdose. Controlled Substances Monitoring: Follow up:  Return if symptoms worsen or fail to improve.     Dariusz Wharton, FRANK - CNP

## 2022-11-18 ENCOUNTER — OFFICE VISIT (OUTPATIENT)
Dept: SPORTS MEDICINE | Age: 77
End: 2022-11-18
Payer: MEDICARE

## 2022-11-18 VITALS
DIASTOLIC BLOOD PRESSURE: 74 MMHG | BODY MASS INDEX: 36.43 KG/M2 | TEMPERATURE: 97.4 F | HEIGHT: 69 IN | SYSTOLIC BLOOD PRESSURE: 126 MMHG | WEIGHT: 246 LBS

## 2022-11-18 DIAGNOSIS — M17.0 PRIMARY OSTEOARTHRITIS OF BOTH KNEES: Primary | ICD-10-CM

## 2022-11-18 PROCEDURE — G8484 FLU IMMUNIZE NO ADMIN: HCPCS | Performed by: FAMILY MEDICINE

## 2022-11-18 PROCEDURE — 1036F TOBACCO NON-USER: CPT | Performed by: FAMILY MEDICINE

## 2022-11-18 PROCEDURE — G8427 DOCREV CUR MEDS BY ELIG CLIN: HCPCS | Performed by: FAMILY MEDICINE

## 2022-11-18 PROCEDURE — G8417 CALC BMI ABV UP PARAM F/U: HCPCS | Performed by: FAMILY MEDICINE

## 2022-11-18 PROCEDURE — 99213 OFFICE O/P EST LOW 20 MIN: CPT | Performed by: FAMILY MEDICINE

## 2022-11-18 PROCEDURE — 1123F ACP DISCUSS/DSCN MKR DOCD: CPT | Performed by: FAMILY MEDICINE

## 2022-11-18 ASSESSMENT — ENCOUNTER SYMPTOMS
FACIAL SWELLING: 0
EYE DISCHARGE: 0
CHEST TIGHTNESS: 0
APNEA: 0
ABDOMINAL DISTENTION: 0
SINUS PAIN: 0

## 2022-11-18 NOTE — PROGRESS NOTES
[facility]) Physicians  Neurosurgery and Pain [facility]  [address], [address] , [address] [facility], [place]: [phone]  F: [phone]      John Quintanilla  (1945)    2022    Subjective:     John Quintanilla is 68 y.o. male who complains today of:    Chief Complaint   Patient presents with    Knee Pain     BOTH    Back Pain       HPI     This patient returns stating that he is doing a lot better says he gets an occasional irritation in the right knee but otherwise doing well he is getting back to activities now because he just recently had [place] but feels like his knees are doing well  Allergies:  Caffeine, Codeine, Penicillins, and Shellfish-derived products    Past Medical History:   Diagnosis Date    Chicken pox     Chronic back pain     Congenital heart disease     Mumps     Osteoarthritis     Rheumatic fever      Past Surgical History:   Procedure Laterality Date    CARDIAC SURGERY  03/10/2003, 2005    ALSO STENTS WERE PLACED.       Family History   Problem Relation Age of Onset    High Blood Pressure Mother     Stroke Mother     High Blood Pressure Father      Social History     Socioeconomic History    Marital status:      Spouse name: Not on file    Number of children: Not on file    Years of education: Not on file    Highest education level: Not on file   Occupational History    Not on file   Tobacco Use    Smoking status: Former     Packs/day: 0.50     Years: 45.00     Pack years: 22.50     Types: Cigarettes     Quit date:      Years since quittin.8    Smokeless tobacco: Never   Substance and Sexual Activity    Alcohol use: No     Alcohol/week: 0.0 standard drinks     Comment: RARELY    Drug use: Never    Sexual activity: Not on file   Other Topics Concern    Not on file   Social History Narrative    Not on file     Social Determinants of Health     Financial Resource Strain: Not on file   Food Insecurity: Not on file   Transportation Needs: Not on file   Physical Activity: Not on file   Stress: Not on file   Social Connections: Not on file   Intimate Partner Violence: Not on file   Housing Stability: Not on file       Current Outpatient Medications on File Prior to Visit   Medication Sig Dispense Refill    mometasone (NASONEX) 50 MCG/ACT nasal spray by Nasal route      triamcinolone (KENALOG) 0.1 % cream Apply to affected areas twice daily for two weeks then take one week break. Repeat      loratadine (CLARITIN) 10 MG tablet take 1 tablet by mouth once daily      losartan (COZAAR) 25 MG tablet       melatonin 3 MG TABS tablet Take by mouth      pantoprazole (PROTONIX) 40 MG tablet       ramelteon (ROZEREM) 8 MG tablet Take by mouth      polyethylene glycol (GLYCOLAX) 17 GM/SCOOP powder Take 17 g by mouth      glimepiride (AMARYL) 2 MG tablet       Handicap Placard MISC by Does not apply route Valid 9/1/19- 9/1/23 1 each 0    allopurinol (ZYLOPRIM) 100 MG tablet Take 200 mg by mouth daily      NONFORMULARY as needed Indications: METALAZONE 25MG  1 TAB PRN      omeprazole (PRILOSEC) 20 MG capsule Take 20 mg by mouth daily      ezetimibe-simvastatin (VYTORIN) 10-20 MG per tablet Take 1 tablet by mouth nightly      carvedilol (COREG) 25 MG tablet       ONE TOUCH ULTRA TEST strip   0    ONETOUCH DELICA LANCETS 36V MISC   0    montelukast (SINGULAIR) 10 MG tablet       NITROSTAT 0.4 MG SL tablet   0    spironolactone (ALDACTONE) 25 MG tablet   0    torsemide (DEMADEX) 100 MG tablet        No current facility-administered medications on file prior to visit. Review of Systems   Constitutional:  Negative for activity change and fever. HENT:  Negative for congestion, facial swelling and sinus pain. Eyes:  Negative for discharge. Respiratory:  Negative for apnea and chest tightness. Gastrointestinal:  Negative for abdominal distention. Endocrine: Negative for cold intolerance.    Genitourinary:  Negative for difficulty urinating and

## 2023-01-13 DIAGNOSIS — M47.816 SPONDYLOSIS OF LUMBAR REGION WITHOUT MYELOPATHY OR RADICULOPATHY: ICD-10-CM

## 2023-01-13 DIAGNOSIS — G57.11 MERALGIA PARAESTHETICA, RIGHT: ICD-10-CM

## 2023-01-13 RX ORDER — GABAPENTIN 300 MG/1
CAPSULE ORAL
Qty: 90 CAPSULE | Refills: 3 | OUTPATIENT
Start: 2023-01-13

## 2023-02-03 DIAGNOSIS — G57.11 MERALGIA PARAESTHETICA, RIGHT: ICD-10-CM

## 2023-02-03 DIAGNOSIS — M47.816 SPONDYLOSIS OF LUMBAR REGION WITHOUT MYELOPATHY OR RADICULOPATHY: ICD-10-CM

## 2023-02-06 RX ORDER — GABAPENTIN 300 MG/1
CAPSULE ORAL
Qty: 90 CAPSULE | Refills: 3 | OUTPATIENT
Start: 2023-02-06

## 2023-02-26 PROBLEM — R16.1 SPLENOMEGALY: Status: ACTIVE | Noted: 2023-02-26

## 2023-02-26 PROBLEM — D63.1 ANEMIA IN CHRONIC KIDNEY DISEASE: Status: ACTIVE | Noted: 2023-02-26

## 2023-02-26 PROBLEM — I34.0 MITRAL REGURGITATION: Status: ACTIVE | Noted: 2023-02-26

## 2023-02-26 PROBLEM — M10.9 GOUT: Status: ACTIVE | Noted: 2023-02-26

## 2023-02-26 PROBLEM — I50.32 CHRONIC DIASTOLIC HEART FAILURE (MULTI): Status: ACTIVE | Noted: 2023-02-26

## 2023-02-26 PROBLEM — K92.2 UPPER GI BLEED: Status: ACTIVE | Noted: 2023-02-26

## 2023-02-26 PROBLEM — I49.8 CHRONOTROPIC INCOMPETENCE WITH SINUS NODE DYSFUNCTION: Status: ACTIVE | Noted: 2023-02-26

## 2023-02-26 PROBLEM — J30.9 ALLERGIC RHINITIS: Status: ACTIVE | Noted: 2023-02-26

## 2023-02-26 PROBLEM — J32.9 SINUSITIS: Status: ACTIVE | Noted: 2023-02-26

## 2023-02-26 PROBLEM — Z95.810 ICD (IMPLANTABLE CARDIOVERTER-DEFIBRILLATOR), BIVENTRICULAR, IN SITU: Status: ACTIVE | Noted: 2023-02-26

## 2023-02-26 PROBLEM — K21.9 ACID REFLUX: Status: ACTIVE | Noted: 2023-02-26

## 2023-02-26 PROBLEM — Q23.9 MITRAL LEAFLET ABNORMALITY (HHS-HCC): Status: ACTIVE | Noted: 2023-02-26

## 2023-02-26 PROBLEM — I38: Status: ACTIVE | Noted: 2023-02-26

## 2023-02-26 PROBLEM — I50.9: Status: ACTIVE | Noted: 2023-02-26

## 2023-02-26 PROBLEM — M54.59 ARTHRALGIA, LUMBAR SPINE: Status: ACTIVE | Noted: 2023-02-26

## 2023-02-26 PROBLEM — R21 SKIN RASH: Status: ACTIVE | Noted: 2023-02-26

## 2023-02-26 PROBLEM — Z86.79 HISTORY OF ISCHEMIC CARDIOMYOPATHY: Status: ACTIVE | Noted: 2023-02-26

## 2023-02-26 PROBLEM — E11.21 DIABETES MELLITUS WITH KIDNEY DISEASE (MULTI): Status: ACTIVE | Noted: 2023-02-26

## 2023-02-26 PROBLEM — N18.9 ANEMIA IN CHRONIC KIDNEY DISEASE: Status: ACTIVE | Noted: 2023-02-26

## 2023-02-26 PROBLEM — I50.22 CHRONIC SYSTOLIC CONGESTIVE HEART FAILURE (MULTI): Status: ACTIVE | Noted: 2023-02-26

## 2023-02-26 PROBLEM — D64.9 SEVERE ANEMIA: Status: ACTIVE | Noted: 2023-02-26

## 2023-02-26 PROBLEM — G47.00 INSOMNIA: Status: ACTIVE | Noted: 2023-02-26

## 2023-02-26 PROBLEM — I44.7 LBBB (LEFT BUNDLE BRANCH BLOCK): Status: ACTIVE | Noted: 2023-02-26

## 2023-02-26 PROBLEM — Z95.818 S/P MITRAL VALVE CLIP IMPLANTATION: Status: ACTIVE | Noted: 2023-02-26

## 2023-02-26 PROBLEM — Z86.16 HISTORY OF COVID-19: Status: ACTIVE | Noted: 2023-02-26

## 2023-02-26 PROBLEM — D50.0 ANEMIA, BLOOD LOSS: Status: ACTIVE | Noted: 2023-02-26

## 2023-02-26 PROBLEM — M12.9 ARTHRITIS, MULTIPLE JOINT INVOLVEMENT: Status: ACTIVE | Noted: 2023-02-26

## 2023-02-26 PROBLEM — R09.82 POST-NASAL DRAINAGE: Status: ACTIVE | Noted: 2023-02-26

## 2023-02-26 PROBLEM — I50.9 CHF (NYHA CLASS II, ACC/AHA STAGE C) (MULTI): Status: ACTIVE | Noted: 2023-02-26

## 2023-02-26 PROBLEM — I47.10 PAROXYSMAL SVT (SUPRAVENTRICULAR TACHYCARDIA) (CMS-HCC): Status: ACTIVE | Noted: 2023-02-26

## 2023-02-26 PROBLEM — D50.9 IRON DEFICIENCY ANEMIA: Status: ACTIVE | Noted: 2023-02-26

## 2023-02-26 PROBLEM — I65.23 BILATERAL CAROTID ARTERY STENOSIS: Status: ACTIVE | Noted: 2023-02-26

## 2023-02-26 PROBLEM — J34.89 SINUS DRAINAGE: Status: ACTIVE | Noted: 2023-02-26

## 2023-02-26 PROBLEM — R10.9 ABDOMINAL PAIN: Status: ACTIVE | Noted: 2023-02-26

## 2023-02-26 PROBLEM — I10 BENIGN ESSENTIAL HYPERTENSION: Status: ACTIVE | Noted: 2023-02-26

## 2023-02-26 PROBLEM — N40.0 BPH (BENIGN PROSTATIC HYPERPLASIA): Status: ACTIVE | Noted: 2023-02-26

## 2023-02-26 PROBLEM — K31.819 AVM (ARTERIOVENOUS MALFORMATION) OF DUODENUM, ACQUIRED: Status: ACTIVE | Noted: 2023-02-26

## 2023-02-26 PROBLEM — M54.50 LOWER BACK PAIN: Status: ACTIVE | Noted: 2023-02-26

## 2023-02-26 PROBLEM — E66.9 CLASS 2 OBESITY WITH BODY MASS INDEX (BMI) OF 37.0 TO 37.9 IN ADULT: Status: ACTIVE | Noted: 2023-02-26

## 2023-02-26 PROBLEM — M47.816 ARTHRITIS OF LUMBAR SPINE: Status: ACTIVE | Noted: 2023-02-26

## 2023-02-26 PROBLEM — K63.5 COLON POLYPS: Status: ACTIVE | Noted: 2023-02-26

## 2023-02-26 PROBLEM — N18.30 STAGE 3 CHRONIC KIDNEY DISEASE (MULTI): Status: ACTIVE | Noted: 2023-02-26

## 2023-02-26 PROBLEM — R10.12 LEFT UPPER QUADRANT ABDOMINAL PAIN: Status: ACTIVE | Noted: 2023-02-26

## 2023-02-26 PROBLEM — I95.9 HYPOTENSION: Status: ACTIVE | Noted: 2023-02-26

## 2023-02-26 PROBLEM — I25.10 ASHD (ARTERIOSCLEROTIC HEART DISEASE): Status: ACTIVE | Noted: 2023-02-26

## 2023-02-26 PROBLEM — Z98.890 S/P MITRAL VALVE CLIP IMPLANTATION: Status: ACTIVE | Noted: 2023-02-26

## 2023-02-26 PROBLEM — G47.30 SLEEP APNEA: Status: ACTIVE | Noted: 2023-02-26

## 2023-02-26 PROBLEM — E78.2 MIXED HYPERLIPIDEMIA: Status: ACTIVE | Noted: 2023-02-26

## 2023-02-26 PROBLEM — T82.198A MECHANICAL COMPLICATION OF IMPLANTABLE CARDIOVERTER-DEFIBRILLATOR (ICD): Status: ACTIVE | Noted: 2023-02-26

## 2023-02-26 PROBLEM — E66.812 CLASS 2 OBESITY WITH BODY MASS INDEX (BMI) OF 37.0 TO 37.9 IN ADULT: Status: ACTIVE | Noted: 2023-02-26

## 2023-02-26 PROBLEM — I25.2 PAST MYOCARDIAL INFARCTION: Status: ACTIVE | Noted: 2023-02-26

## 2023-02-26 PROBLEM — E87.6 HYPOKALEMIA: Status: ACTIVE | Noted: 2023-02-26

## 2023-02-26 PROBLEM — M17.11 PRIMARY OSTEOARTHRITIS OF RIGHT KNEE: Status: ACTIVE | Noted: 2023-02-26

## 2023-02-26 RX ORDER — TRIAMCINOLONE ACETONIDE 1 MG/G
1 CREAM TOPICAL 2 TIMES DAILY
COMMUNITY
Start: 2021-11-01 | End: 2024-06-04 | Stop reason: WASHOUT

## 2023-02-26 RX ORDER — TEMAZEPAM 7.5 MG/1
7.5 CAPSULE ORAL NIGHTLY PRN
COMMUNITY
Start: 2022-03-31 | End: 2023-12-05 | Stop reason: WASHOUT

## 2023-02-26 RX ORDER — ASCORBIC ACID 500 MG
500 TABLET ORAL DAILY
COMMUNITY

## 2023-02-26 RX ORDER — GABAPENTIN 300 MG/1
300 CAPSULE ORAL DAILY PRN
COMMUNITY

## 2023-02-26 RX ORDER — GLIPIZIDE 5 MG/1
5 TABLET ORAL DAILY
COMMUNITY
End: 2023-06-14

## 2023-02-26 RX ORDER — FLUTICASONE PROPIONATE 50 MCG
1 SPRAY, SUSPENSION (ML) NASAL DAILY
COMMUNITY
Start: 2022-04-28

## 2023-02-26 RX ORDER — MOMETASONE FUROATE 50 UG/1
1 SPRAY, METERED NASAL DAILY
COMMUNITY
Start: 2021-07-26 | End: 2023-12-08 | Stop reason: WASHOUT

## 2023-02-26 RX ORDER — GLIMEPIRIDE 2 MG/1
2 TABLET ORAL 2 TIMES DAILY
COMMUNITY
Start: 2020-03-09 | End: 2023-03-14 | Stop reason: ALTCHOICE

## 2023-02-26 RX ORDER — PANTOPRAZOLE SODIUM 40 MG/1
1 TABLET, DELAYED RELEASE ORAL DAILY
COMMUNITY
Start: 2020-07-24 | End: 2023-03-31

## 2023-02-26 RX ORDER — METOLAZONE 2.5 MG/1
2.5 TABLET ORAL DAILY PRN
COMMUNITY
Start: 2019-07-08

## 2023-02-26 RX ORDER — TORSEMIDE 100 MG/1
0.5 TABLET ORAL 2 TIMES DAILY
COMMUNITY
Start: 2019-07-08 | End: 2023-12-22 | Stop reason: SDUPTHER

## 2023-02-26 RX ORDER — CALCIUM CITRATE/VITAMIN D3 200MG-6.25
TABLET ORAL
COMMUNITY
End: 2023-11-27 | Stop reason: SDUPTHER

## 2023-02-26 RX ORDER — MONTELUKAST SODIUM 10 MG/1
10 TABLET ORAL NIGHTLY
COMMUNITY
Start: 2019-07-08

## 2023-02-26 RX ORDER — LOSARTAN POTASSIUM 25 MG/1
1 TABLET ORAL DAILY
COMMUNITY
End: 2023-11-29

## 2023-02-26 RX ORDER — LANCETS
EACH MISCELLANEOUS
COMMUNITY

## 2023-02-26 RX ORDER — ALLOPURINOL 100 MG/1
2 TABLET ORAL DAILY
COMMUNITY
Start: 2019-07-08 | End: 2023-08-31

## 2023-02-26 RX ORDER — LANOLIN ALCOHOL/MO/W.PET/CERES
400 CREAM (GRAM) TOPICAL DAILY
COMMUNITY

## 2023-02-26 RX ORDER — CARVEDILOL 25 MG/1
1 TABLET ORAL 2 TIMES DAILY
COMMUNITY
Start: 2020-01-28 | End: 2024-02-19

## 2023-02-26 RX ORDER — NITROGLYCERIN 0.4 MG/1
0.4 TABLET SUBLINGUAL AS NEEDED
COMMUNITY
Start: 2019-07-08

## 2023-02-26 RX ORDER — RAMELTEON 8 MG/1
1 TABLET ORAL NIGHTLY PRN
COMMUNITY
Start: 2022-04-12 | End: 2024-06-04 | Stop reason: WASHOUT

## 2023-02-26 RX ORDER — LORATADINE 10 MG/1
10 TABLET ORAL DAILY
COMMUNITY
End: 2023-12-08 | Stop reason: WASHOUT

## 2023-02-26 RX ORDER — IBUPROFEN 200 MG
CAPSULE ORAL
COMMUNITY
Start: 2019-04-26

## 2023-02-26 RX ORDER — SPIRONOLACTONE 25 MG/1
50 TABLET ORAL DAILY
COMMUNITY
End: 2024-02-19

## 2023-02-26 RX ORDER — SIMVASTATIN 20 MG/1
1 TABLET, FILM COATED ORAL NIGHTLY
COMMUNITY
Start: 2019-07-08 | End: 2024-02-19

## 2023-03-14 ENCOUNTER — OFFICE VISIT (OUTPATIENT)
Dept: PRIMARY CARE | Facility: CLINIC | Age: 78
End: 2023-03-14
Payer: MEDICARE

## 2023-03-14 VITALS
HEART RATE: 68 BPM | BODY MASS INDEX: 36.73 KG/M2 | HEIGHT: 69 IN | TEMPERATURE: 98.4 F | SYSTOLIC BLOOD PRESSURE: 126 MMHG | DIASTOLIC BLOOD PRESSURE: 74 MMHG | WEIGHT: 248 LBS

## 2023-03-14 DIAGNOSIS — I10 BENIGN ESSENTIAL HYPERTENSION: ICD-10-CM

## 2023-03-14 DIAGNOSIS — I38: ICD-10-CM

## 2023-03-14 DIAGNOSIS — Z95.810 ICD (IMPLANTABLE CARDIOVERTER-DEFIBRILLATOR), BIVENTRICULAR, IN SITU: ICD-10-CM

## 2023-03-14 DIAGNOSIS — N18.31 STAGE 3A CHRONIC KIDNEY DISEASE (MULTI): ICD-10-CM

## 2023-03-14 DIAGNOSIS — I25.10 ASHD (ARTERIOSCLEROTIC HEART DISEASE): ICD-10-CM

## 2023-03-14 DIAGNOSIS — I50.9 CHF (NYHA CLASS II, ACC/AHA STAGE C) (MULTI): ICD-10-CM

## 2023-03-14 DIAGNOSIS — I50.20: ICD-10-CM

## 2023-03-14 DIAGNOSIS — D50.9 IRON DEFICIENCY ANEMIA, UNSPECIFIED IRON DEFICIENCY ANEMIA TYPE: ICD-10-CM

## 2023-03-14 DIAGNOSIS — R16.1 SPLENOMEGALY: ICD-10-CM

## 2023-03-14 DIAGNOSIS — T82.198A MECHANICAL COMPLICATION OF IMPLANTABLE CARDIOVERTER-DEFIBRILLATOR (ICD): Primary | ICD-10-CM

## 2023-03-14 DIAGNOSIS — Z86.79 HISTORY OF ISCHEMIC CARDIOMYOPATHY: ICD-10-CM

## 2023-03-14 PROCEDURE — 99213 OFFICE O/P EST LOW 20 MIN: CPT | Performed by: INTERNAL MEDICINE

## 2023-03-14 PROCEDURE — 1036F TOBACCO NON-USER: CPT | Performed by: INTERNAL MEDICINE

## 2023-03-14 PROCEDURE — 1157F ADVNC CARE PLAN IN RCRD: CPT | Performed by: INTERNAL MEDICINE

## 2023-03-14 PROCEDURE — 3074F SYST BP LT 130 MM HG: CPT | Performed by: INTERNAL MEDICINE

## 2023-03-14 PROCEDURE — 1160F RVW MEDS BY RX/DR IN RCRD: CPT | Performed by: INTERNAL MEDICINE

## 2023-03-14 PROCEDURE — 1159F MED LIST DOCD IN RCRD: CPT | Performed by: INTERNAL MEDICINE

## 2023-03-14 PROCEDURE — 3078F DIAST BP <80 MM HG: CPT | Performed by: INTERNAL MEDICINE

## 2023-03-14 ASSESSMENT — ENCOUNTER SYMPTOMS
DEPRESSION: 0
BRUISES/BLEEDS EASILY: 0
OCCASIONAL FEELINGS OF UNSTEADINESS: 0
EYES NEGATIVE: 1
RESPIRATORY NEGATIVE: 1
CONSTITUTIONAL NEGATIVE: 1
NEUROLOGICAL NEGATIVE: 1
ENDOCRINE NEGATIVE: 1
CARDIOVASCULAR NEGATIVE: 1
PSYCHIATRIC NEGATIVE: 1
LOSS OF SENSATION IN FEET: 0
ADENOPATHY: 0

## 2023-03-14 NOTE — PROGRESS NOTES
"Subjective   Patient ID: Nilo Benavidez is a 78 y.o. male who presents for Follow-up (Routine visit seen oncologist).    Patient here for follow-up he has been feeling fine he still gets some discomfort in the left upper quadrant most likely from splenomegaly he was referred to heme-onc and did the recommended follow-up scan in 6 months and he was put on iron for low iron and anemia         Review of Systems   Constitutional: Negative.    HENT: Negative.     Eyes: Negative.    Endocrine: Negative.    Genitourinary: Negative.    Neurological: Negative.    Hematological:  Negative for adenopathy. Does not bruise/bleed easily.        Anemia   Psychiatric/Behavioral: Negative.     All other systems reviewed and are negative.      Objective   /74   Pulse 68   Temp 36.9 °C (98.4 °F) (Temporal)   Ht 1.74 m (5' 8.5\")   Wt 112 kg (248 lb)   BMI 37.16 kg/m²     Physical Exam  Vitals reviewed.   Constitutional:       Appearance: Normal appearance. He is obese.   HENT:      Head: Normocephalic and atraumatic.   Eyes:      Extraocular Movements: Extraocular movements intact.      Pupils: Pupils are equal, round, and reactive to light.   Cardiovascular:      Rate and Rhythm: Normal rate and regular rhythm.   Pulmonary:      Effort: Pulmonary effort is normal.      Breath sounds: Normal breath sounds.   Abdominal:      Palpations: Abdomen is soft.   Musculoskeletal:         General: Normal range of motion.   Neurological:      General: No focal deficit present.      Mental Status: He is alert and oriented to person, place, and time. Mental status is at baseline.   Psychiatric:         Mood and Affect: Mood normal.         Behavior: Behavior normal.         Thought Content: Thought content normal.         Assessment/Plan            "

## 2023-03-14 NOTE — PROGRESS NOTES
"Subjective   Patient ID: Nilo Benavidez is a 78 y.o. male who presents for Follow-up (Routine visit seen oncologist).    Patient here for follow-up he saw heme-onc for splenomegaly and they will just repeat a scan in 6 months he has been put on iron by hematology for anemia.  He has CHF and is on torsemide he also had MitraClip procedure done 2 years ago which has helped him         Review of Systems   Constitutional: Negative.    HENT: Negative.     Eyes: Negative.    Respiratory: Negative.     Cardiovascular: Negative.    Endocrine: Negative.    Neurological: Negative.    All other systems reviewed and are negative.      Objective   /74   Pulse 68   Temp 36.9 °C (98.4 °F) (Temporal)   Ht 1.74 m (5' 8.5\")   Wt 112 kg (248 lb)   BMI 37.16 kg/m²     Physical Exam  Vitals reviewed.   Constitutional:       Appearance: Normal appearance.   HENT:      Head: Normocephalic and atraumatic.   Eyes:      Extraocular Movements: Extraocular movements intact.      Pupils: Pupils are equal, round, and reactive to light.   Cardiovascular:      Rate and Rhythm: Normal rate and regular rhythm.   Pulmonary:      Effort: Pulmonary effort is normal.      Breath sounds: Normal breath sounds.   Abdominal:      Palpations: Abdomen is soft.   Musculoskeletal:         General: Normal range of motion.   Neurological:      General: No focal deficit present.      Mental Status: He is alert.   Psychiatric:         Mood and Affect: Mood normal.         Behavior: Behavior normal.         Thought Content: Thought content normal.         Assessment/Plan   Problem List Items Addressed This Visit          Circulatory    ASHD (arteriosclerotic heart disease)    Benign essential hypertension    CHF (NYHA class II, ACC/AHA stage C) (CMS/HCC)    Heart failure due to valvular disease (CMS/HCC)     Patient is on 100 mg torsemide daily he takes metolazone only when he feels bloated and gets shortness of breath.  [facility]         ICD (implantable cardioverter-defibrillator), biventricular, in situ     He has ICD for chronic systolic dysfunction and chronic ischemic cardiomyopathy.            Digestive    Splenomegaly     Mild splenomegaly follow-up with heme-onc.            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

## 2023-03-14 NOTE — ASSESSMENT & PLAN NOTE
Patient is on 100 mg torsemide daily he takes metolazone only when he feels bloated and gets shortness of breath.  He had Mary clip procedure done at [place] couple years ago

## 2023-03-31 DIAGNOSIS — K21.9 GASTROESOPHAGEAL REFLUX DISEASE, UNSPECIFIED WHETHER ESOPHAGITIS PRESENT: ICD-10-CM

## 2023-03-31 RX ORDER — PANTOPRAZOLE SODIUM 40 MG/1
TABLET, DELAYED RELEASE ORAL
Qty: 90 TABLET | Refills: 3 | Status: SHIPPED | OUTPATIENT
Start: 2023-03-31 | End: 2024-02-19

## 2023-04-06 LAB
BASOPHILS (10*3/UL) IN BLOOD BY AUTOMATED COUNT: 0.02 X10E9/L (ref 0–0.1)
BASOPHILS/100 LEUKOCYTES IN BLOOD BY AUTOMATED COUNT: 0.4 % (ref 0–2)
EOSINOPHILS (10*3/UL) IN BLOOD BY AUTOMATED COUNT: 0.12 X10E9/L (ref 0–0.4)
EOSINOPHILS/100 LEUKOCYTES IN BLOOD BY AUTOMATED COUNT: 2.3 % (ref 0–6)
ERYTHROCYTE DISTRIBUTION WIDTH (RATIO) BY AUTOMATED COUNT: 18.3 % (ref 11.5–14.5)
ERYTHROCYTE MEAN CORPUSCULAR HEMOGLOBIN CONCENTRATION (G/DL) BY AUTOMATED: 31.1 G/DL (ref 32–36)
ERYTHROCYTE MEAN CORPUSCULAR VOLUME (FL) BY AUTOMATED COUNT: 92 FL (ref 80–100)
ERYTHROCYTES (10*6/UL) IN BLOOD BY AUTOMATED COUNT: 4.21 X10E12/L (ref 4.5–5.9)
HEMATOCRIT (%) IN BLOOD BY AUTOMATED COUNT: 38.6 % (ref 41–52)
HEMOGLOBIN (G/DL) IN BLOOD: 12 G/DL (ref 13.5–17.5)
IMMATURE GRANULOCYTES/100 LEUKOCYTES IN BLOOD BY AUTOMATED COUNT: 1.1 % (ref 0–0.9)
LEUKOCYTES (10*3/UL) IN BLOOD BY AUTOMATED COUNT: 5.3 X10E9/L (ref 4.4–11.3)
LYMPHOCYTES (10*3/UL) IN BLOOD BY AUTOMATED COUNT: 1.01 X10E9/L (ref 0.8–3)
LYMPHOCYTES/100 LEUKOCYTES IN BLOOD BY AUTOMATED COUNT: 19 % (ref 13–44)
MONOCYTES (10*3/UL) IN BLOOD BY AUTOMATED COUNT: 0.96 X10E9/L (ref 0.05–0.8)
MONOCYTES/100 LEUKOCYTES IN BLOOD BY AUTOMATED COUNT: 18.1 % (ref 2–10)
NEUTROPHILS (10*3/UL) IN BLOOD BY AUTOMATED COUNT: 3.14 X10E9/L (ref 1.6–5.5)
NEUTROPHILS/100 LEUKOCYTES IN BLOOD BY AUTOMATED COUNT: 59.1 % (ref 40–80)
PLATELETS (10*3/UL) IN BLOOD AUTOMATED COUNT: 249 X10E9/L (ref 150–450)

## 2023-05-09 ENCOUNTER — LAB (OUTPATIENT)
Dept: LAB | Facility: LAB | Age: 78
End: 2023-05-09
Payer: MEDICARE

## 2023-05-09 DIAGNOSIS — D50.9 IRON DEFICIENCY ANEMIA, UNSPECIFIED IRON DEFICIENCY ANEMIA TYPE: ICD-10-CM

## 2023-05-09 DIAGNOSIS — D64.9 SEVERE ANEMIA: ICD-10-CM

## 2023-05-09 LAB
BASOPHILS (10*3/UL) IN BLOOD BY AUTOMATED COUNT: 0.02 X10E9/L (ref 0–0.1)
BASOPHILS/100 LEUKOCYTES IN BLOOD BY AUTOMATED COUNT: 0.4 % (ref 0–2)
EOSINOPHILS (10*3/UL) IN BLOOD BY AUTOMATED COUNT: 0.09 X10E9/L (ref 0–0.4)
EOSINOPHILS/100 LEUKOCYTES IN BLOOD BY AUTOMATED COUNT: 1.7 % (ref 0–6)
ERYTHROCYTE DISTRIBUTION WIDTH (RATIO) BY AUTOMATED COUNT: 18.1 % (ref 11.5–14.5)
ERYTHROCYTE MEAN CORPUSCULAR HEMOGLOBIN CONCENTRATION (G/DL) BY AUTOMATED: 30.7 G/DL (ref 32–36)
ERYTHROCYTE MEAN CORPUSCULAR VOLUME (FL) BY AUTOMATED COUNT: 95 FL (ref 80–100)
ERYTHROCYTES (10*6/UL) IN BLOOD BY AUTOMATED COUNT: 4.2 X10E12/L (ref 4.5–5.9)
HEMATOCRIT (%) IN BLOOD BY AUTOMATED COUNT: 39.8 % (ref 41–52)
HEMOGLOBIN (G/DL) IN BLOOD: 12.2 G/DL (ref 13.5–17.5)
IMMATURE GRANULOCYTES/100 LEUKOCYTES IN BLOOD BY AUTOMATED COUNT: 0.8 % (ref 0–0.9)
LEUKOCYTES (10*3/UL) IN BLOOD BY AUTOMATED COUNT: 5.2 X10E9/L (ref 4.4–11.3)
LYMPHOCYTES (10*3/UL) IN BLOOD BY AUTOMATED COUNT: 1.02 X10E9/L (ref 0.8–3)
LYMPHOCYTES/100 LEUKOCYTES IN BLOOD BY AUTOMATED COUNT: 19.7 % (ref 13–44)
MONOCYTES (10*3/UL) IN BLOOD BY AUTOMATED COUNT: 0.86 X10E9/L (ref 0.05–0.8)
MONOCYTES/100 LEUKOCYTES IN BLOOD BY AUTOMATED COUNT: 16.6 % (ref 2–10)
NEUTROPHILS (10*3/UL) IN BLOOD BY AUTOMATED COUNT: 3.16 X10E9/L (ref 1.6–5.5)
NEUTROPHILS/100 LEUKOCYTES IN BLOOD BY AUTOMATED COUNT: 60.8 % (ref 40–80)
PLATELETS (10*3/UL) IN BLOOD AUTOMATED COUNT: 216 X10E9/L (ref 150–450)

## 2023-05-09 PROCEDURE — 85025 COMPLETE CBC W/AUTO DIFF WBC: CPT

## 2023-05-09 PROCEDURE — 36415 COLL VENOUS BLD VENIPUNCTURE: CPT

## 2023-06-07 ENCOUNTER — LAB (OUTPATIENT)
Dept: LAB | Facility: LAB | Age: 78
End: 2023-06-07
Payer: MEDICARE

## 2023-06-07 DIAGNOSIS — D64.9 SEVERE ANEMIA: ICD-10-CM

## 2023-06-07 DIAGNOSIS — D50.9 IRON DEFICIENCY ANEMIA, UNSPECIFIED IRON DEFICIENCY ANEMIA TYPE: ICD-10-CM

## 2023-06-07 LAB
ALBUMIN (G/DL) IN SER/PLAS: 4.1 G/DL (ref 3.4–5)
ANION GAP IN SER/PLAS: 14 MMOL/L (ref 10–20)
BASOPHILS (10*3/UL) IN BLOOD BY AUTOMATED COUNT: 0.03 X10E9/L (ref 0–0.1)
BASOPHILS/100 LEUKOCYTES IN BLOOD BY AUTOMATED COUNT: 0.6 % (ref 0–2)
CALCIDIOL (25 OH VITAMIN D3) (NG/ML) IN SER/PLAS: 86 NG/ML
CALCIUM (MG/DL) IN SER/PLAS: 10 MG/DL (ref 8.6–10.3)
CARBON DIOXIDE, TOTAL (MMOL/L) IN SER/PLAS: 28 MMOL/L (ref 21–32)
CHLORIDE (MMOL/L) IN SER/PLAS: 100 MMOL/L (ref 98–107)
CREATININE (MG/DL) IN SER/PLAS: 1.49 MG/DL (ref 0.5–1.3)
EOSINOPHILS (10*3/UL) IN BLOOD BY AUTOMATED COUNT: 0.09 X10E9/L (ref 0–0.4)
EOSINOPHILS/100 LEUKOCYTES IN BLOOD BY AUTOMATED COUNT: 1.7 % (ref 0–6)
ERYTHROCYTE DISTRIBUTION WIDTH (RATIO) BY AUTOMATED COUNT: 17.1 % (ref 11.5–14.5)
ERYTHROCYTE DISTRIBUTION WIDTH (RATIO) BY AUTOMATED COUNT: NORMAL
ERYTHROCYTE MEAN CORPUSCULAR HEMOGLOBIN CONCENTRATION (G/DL) BY AUTOMATED: 31.6 G/DL (ref 32–36)
ERYTHROCYTE MEAN CORPUSCULAR HEMOGLOBIN CONCENTRATION (G/DL) BY AUTOMATED: NORMAL
ERYTHROCYTE MEAN CORPUSCULAR VOLUME (FL) BY AUTOMATED COUNT: 96 FL (ref 80–100)
ERYTHROCYTE MEAN CORPUSCULAR VOLUME (FL) BY AUTOMATED COUNT: NORMAL
ERYTHROCYTES (10*6/UL) IN BLOOD BY AUTOMATED COUNT: 4.24 X10E12/L (ref 4.5–5.9)
ERYTHROCYTES (10*6/UL) IN BLOOD BY AUTOMATED COUNT: NORMAL
GFR MALE: 48 ML/MIN/1.73M2
GLUCOSE (MG/DL) IN SER/PLAS: 74 MG/DL (ref 74–99)
HEMATOCRIT (%) IN BLOOD BY AUTOMATED COUNT: 40.5 % (ref 41–52)
HEMATOCRIT (%) IN BLOOD BY AUTOMATED COUNT: NORMAL
HEMOGLOBIN (G/DL) IN BLOOD: 12.8 G/DL (ref 13.5–17.5)
HEMOGLOBIN (G/DL) IN BLOOD: NORMAL
IMMATURE GRANULOCYTES/100 LEUKOCYTES IN BLOOD BY AUTOMATED COUNT: 1.1 % (ref 0–0.9)
LEUKOCYTES (10*3/UL) IN BLOOD BY AUTOMATED COUNT: 5.3 X10E9/L (ref 4.4–11.3)
LEUKOCYTES (10*3/UL) IN BLOOD BY AUTOMATED COUNT: NORMAL
LYMPHOCYTES (10*3/UL) IN BLOOD BY AUTOMATED COUNT: 0.99 X10E9/L (ref 0.8–3)
LYMPHOCYTES/100 LEUKOCYTES IN BLOOD BY AUTOMATED COUNT: 18.6 % (ref 13–44)
MONOCYTES (10*3/UL) IN BLOOD BY AUTOMATED COUNT: 1.15 X10E9/L (ref 0.05–0.8)
MONOCYTES/100 LEUKOCYTES IN BLOOD BY AUTOMATED COUNT: 21.7 % (ref 2–10)
NEUTROPHILS (10*3/UL) IN BLOOD BY AUTOMATED COUNT: 2.99 X10E9/L (ref 1.6–5.5)
NEUTROPHILS/100 LEUKOCYTES IN BLOOD BY AUTOMATED COUNT: 56.3 % (ref 40–80)
NRBC (PER 100 WBCS) BY AUTOMATED COUNT: NORMAL
PARATHYRIN INTACT (PG/ML) IN SER/PLAS: 48.6 PG/ML (ref 18.5–88)
PHOSPHATE (MG/DL) IN SER/PLAS: 3.3 MG/DL (ref 2.5–4.9)
PLATELETS (10*3/UL) IN BLOOD AUTOMATED COUNT: 237 X10E9/L (ref 150–450)
PLATELETS (10*3/UL) IN BLOOD AUTOMATED COUNT: NORMAL
POTASSIUM (MMOL/L) IN SER/PLAS: 3.7 MMOL/L (ref 3.5–5.3)
SODIUM (MMOL/L) IN SER/PLAS: 138 MMOL/L (ref 136–145)
UREA NITROGEN (MG/DL) IN SER/PLAS: 28 MG/DL (ref 6–23)

## 2023-06-07 PROCEDURE — 85025 COMPLETE CBC W/AUTO DIFF WBC: CPT

## 2023-06-07 PROCEDURE — 36415 COLL VENOUS BLD VENIPUNCTURE: CPT

## 2023-06-14 ENCOUNTER — OFFICE VISIT (OUTPATIENT)
Dept: PRIMARY CARE | Facility: CLINIC | Age: 78
End: 2023-06-14
Payer: MEDICARE

## 2023-06-14 VITALS
BODY MASS INDEX: 38.36 KG/M2 | TEMPERATURE: 97.2 F | SYSTOLIC BLOOD PRESSURE: 122 MMHG | HEART RATE: 80 BPM | WEIGHT: 256 LBS | DIASTOLIC BLOOD PRESSURE: 70 MMHG

## 2023-06-14 DIAGNOSIS — E11.21 DIABETES MELLITUS WITH KIDNEY DISEASE (MULTI): ICD-10-CM

## 2023-06-14 DIAGNOSIS — E78.2 MIXED HYPERLIPIDEMIA: ICD-10-CM

## 2023-06-14 DIAGNOSIS — E11.69 TYPE 2 DIABETES MELLITUS WITH OTHER SPECIFIED COMPLICATION, WITHOUT LONG-TERM CURRENT USE OF INSULIN (MULTI): Primary | ICD-10-CM

## 2023-06-14 DIAGNOSIS — Z95.818 S/P MITRAL VALVE CLIP IMPLANTATION: ICD-10-CM

## 2023-06-14 DIAGNOSIS — Z95.810 ICD (IMPLANTABLE CARDIOVERTER-DEFIBRILLATOR), BIVENTRICULAR, IN SITU: ICD-10-CM

## 2023-06-14 DIAGNOSIS — Z98.890 S/P MITRAL VALVE CLIP IMPLANTATION: ICD-10-CM

## 2023-06-14 DIAGNOSIS — I50.9 CHF (NYHA CLASS II, ACC/AHA STAGE C) (MULTI): ICD-10-CM

## 2023-06-14 DIAGNOSIS — D63.1 ANEMIA IN STAGE 3 CHRONIC KIDNEY DISEASE, UNSPECIFIED WHETHER STAGE 3A OR 3B CKD (MULTI): ICD-10-CM

## 2023-06-14 DIAGNOSIS — M15.9 OSTEOARTHRITIS OF MULTIPLE JOINTS, UNSPECIFIED OSTEOARTHRITIS TYPE: ICD-10-CM

## 2023-06-14 DIAGNOSIS — N18.30 ANEMIA IN STAGE 3 CHRONIC KIDNEY DISEASE, UNSPECIFIED WHETHER STAGE 3A OR 3B CKD (MULTI): ICD-10-CM

## 2023-06-14 DIAGNOSIS — I50.22 CHRONIC SYSTOLIC CONGESTIVE HEART FAILURE (MULTI): ICD-10-CM

## 2023-06-14 LAB
APPEARANCE, URINE: CLEAR
BACTERIA, URINE: ABNORMAL /HPF
BILIRUBIN, URINE: NEGATIVE
BLOOD, URINE: NEGATIVE
COLOR, URINE: YELLOW
CREATININE (MG/DL) IN URINE: 117 MG/DL (ref 20–370)
GLUCOSE, URINE: NEGATIVE MG/DL
HYALINE CASTS, URINE: ABNORMAL /LPF
KETONES, URINE: NEGATIVE MG/DL
LEUKOCYTE ESTERASE, URINE: ABNORMAL
NITRITE, URINE: NEGATIVE
PH, URINE: 5 (ref 5–8)
PROTEIN (MG/DL) IN URINE: 21 MG/DL (ref 5–25)
PROTEIN, URINE: NEGATIVE MG/DL
PROTEIN/CREATININE (MG/MG) IN URINE: 0.18 MG/MG CREAT (ref 0–0.17)
RBC, URINE: ABNORMAL /HPF (ref 0–5)
SPECIFIC GRAVITY, URINE: 1.02 (ref 1–1.03)
SQUAMOUS EPITHELIAL CELLS, URINE: <1 /HPF
UROBILINOGEN, URINE: <2 MG/DL (ref 0–1.9)
WBC, URINE: 8 /HPF (ref 0–5)

## 2023-06-14 PROCEDURE — 99214 OFFICE O/P EST MOD 30 MIN: CPT | Performed by: INTERNAL MEDICINE

## 2023-06-14 PROCEDURE — 1157F ADVNC CARE PLAN IN RCRD: CPT | Performed by: INTERNAL MEDICINE

## 2023-06-14 PROCEDURE — 1160F RVW MEDS BY RX/DR IN RCRD: CPT | Performed by: INTERNAL MEDICINE

## 2023-06-14 PROCEDURE — 3078F DIAST BP <80 MM HG: CPT | Performed by: INTERNAL MEDICINE

## 2023-06-14 PROCEDURE — 1159F MED LIST DOCD IN RCRD: CPT | Performed by: INTERNAL MEDICINE

## 2023-06-14 PROCEDURE — 1036F TOBACCO NON-USER: CPT | Performed by: INTERNAL MEDICINE

## 2023-06-14 PROCEDURE — 3074F SYST BP LT 130 MM HG: CPT | Performed by: INTERNAL MEDICINE

## 2023-06-14 RX ORDER — GLIMEPIRIDE 4 MG/1
4 TABLET ORAL 2 TIMES DAILY
Qty: 180 TABLET | Refills: 3 | Status: SHIPPED | OUTPATIENT
Start: 2023-06-14 | End: 2024-04-22

## 2023-06-14 ASSESSMENT — ENCOUNTER SYMPTOMS
RESPIRATORY NEGATIVE: 1
PSYCHIATRIC NEGATIVE: 1
CONSTITUTIONAL NEGATIVE: 1
ENDOCRINE COMMENTS: DM2
CARDIOVASCULAR NEGATIVE: 1
EYES NEGATIVE: 1
NEUROLOGICAL NEGATIVE: 1
MUSCULOSKELETAL NEGATIVE: 1
GASTROINTESTINAL NEGATIVE: 1
HEMATOLOGIC/LYMPHATIC NEGATIVE: 1

## 2023-06-14 NOTE — PROGRESS NOTES
Subjective   Patient ID: Nilo Benavidez is a 78 y.o. male who presents for Follow-up (Patient here for OV with lab rescults).    Glipizide not effective in B sugar control.  Patient will be put back on glimepiride which was on backorder at his mail in pharmacy.  He has low ejection fraction and had mitral valve clip he did not have an echocardiogram so far he also has AICD due to ischemic cardiomyopathy and low EF.  He is on diuretics for his congestive heart failure management his blood work showed improved CBC creatinine has been stable.         Review of Systems   Constitutional: Negative.    HENT: Negative.     Eyes: Negative.    Respiratory: Negative.     Cardiovascular: Negative.    Gastrointestinal: Negative.    Endocrine:        DM2   Genitourinary: Negative.    Musculoskeletal: Negative.    Neurological: Negative.    Hematological: Negative.    Psychiatric/Behavioral: Negative.     All other systems reviewed and are negative.      Objective   /70   Pulse 80   Temp 36.2 °C (97.2 °F) (Temporal)   Wt 116 kg (256 lb)   BMI 38.36 kg/m²     Physical Exam  Vitals reviewed.   Constitutional:       Appearance: Normal appearance.   Eyes:      Pupils: Pupils are equal, round, and reactive to light.   Cardiovascular:      Rate and Rhythm: Normal rate and regular rhythm.   Pulmonary:      Effort: Pulmonary effort is normal.      Breath sounds: Normal breath sounds.   Abdominal:      General: Abdomen is flat.      Palpations: Abdomen is soft.   Musculoskeletal:      Right lower leg: No edema.      Left lower leg: No edema.   Neurological:      General: No focal deficit present.      Mental Status: He is alert and oriented to person, place, and time.   Psychiatric:         Mood and Affect: Mood normal.         Behavior: Behavior normal.         Thought Content: Thought content normal.       Assessment/Plan   Problem List Items Addressed This Visit          Circulatory    CHF (NYHA class II, ACC/AHA stage C)  (CMS/[facility])    Chronic systolic congestive heart failure (CMS/[facility])    ICD (implantable cardioverter-defibrillator), biventricular, in situ    S/P mitral valve clip implantation       Endocrine/Metabolic    Diabetes mellitus with kidney disease (CMS/[facility])       Hematologic    Anemia in chronic kidney disease       Other    Mixed hyperlipidemia     Other Visit Diagnoses       Type 2 diabetes mellitus with other specified complication, without long-term current use of insulin (CMS/[facility])    -  Primary    Relevant Medications    glimepiride (AmaryL) 4 mg tablet        Patient CAD CHF is stable chronic kidney disease stable blood sugars were high so we changed him back to glimepiride 4 mg twice daily.  Patient has ICD due to systolic dysfunction and low ejection fraction he is due for another echo is done well after mitral valve clip's surgery.  He is hemoglobin is stable and has not shown any signs of GI blood loss

## 2023-07-11 ENCOUNTER — TELEPHONE (OUTPATIENT)
Dept: PRIMARY CARE | Facility: CLINIC | Age: 78
End: 2023-07-11

## 2023-07-11 DIAGNOSIS — M12.9 ARTHRITIS, MULTIPLE JOINT INVOLVEMENT: ICD-10-CM

## 2023-07-14 ENCOUNTER — LAB (OUTPATIENT)
Dept: LAB | Facility: LAB | Age: 78
End: 2023-07-14
Payer: MEDICARE

## 2023-07-14 DIAGNOSIS — D50.9 IRON DEFICIENCY ANEMIA, UNSPECIFIED IRON DEFICIENCY ANEMIA TYPE: ICD-10-CM

## 2023-07-14 DIAGNOSIS — D64.9 SEVERE ANEMIA: ICD-10-CM

## 2023-07-14 LAB
BASOPHILS (10*3/UL) IN BLOOD BY AUTOMATED COUNT: 0.02 X10E9/L (ref 0–0.1)
BASOPHILS/100 LEUKOCYTES IN BLOOD BY AUTOMATED COUNT: 0.4 % (ref 0–2)
EOSINOPHILS (10*3/UL) IN BLOOD BY AUTOMATED COUNT: 0.1 X10E9/L (ref 0–0.4)
EOSINOPHILS/100 LEUKOCYTES IN BLOOD BY AUTOMATED COUNT: 1.8 % (ref 0–6)
ERYTHROCYTE DISTRIBUTION WIDTH (RATIO) BY AUTOMATED COUNT: 15.8 % (ref 11.5–14.5)
ERYTHROCYTE MEAN CORPUSCULAR HEMOGLOBIN CONCENTRATION (G/DL) BY AUTOMATED: 31.6 G/DL (ref 32–36)
ERYTHROCYTE MEAN CORPUSCULAR VOLUME (FL) BY AUTOMATED COUNT: 98 FL (ref 80–100)
ERYTHROCYTES (10*6/UL) IN BLOOD BY AUTOMATED COUNT: 4.18 X10E12/L (ref 4.5–5.9)
HEMATOCRIT (%) IN BLOOD BY AUTOMATED COUNT: 41.1 % (ref 41–52)
HEMOGLOBIN (G/DL) IN BLOOD: 13 G/DL (ref 13.5–17.5)
IMMATURE GRANULOCYTES/100 LEUKOCYTES IN BLOOD BY AUTOMATED COUNT: 0.4 % (ref 0–0.9)
LEUKOCYTES (10*3/UL) IN BLOOD BY AUTOMATED COUNT: 5.6 X10E9/L (ref 4.4–11.3)
LYMPHOCYTES (10*3/UL) IN BLOOD BY AUTOMATED COUNT: 0.92 X10E9/L (ref 0.8–3)
LYMPHOCYTES/100 LEUKOCYTES IN BLOOD BY AUTOMATED COUNT: 16.5 % (ref 13–44)
MONOCYTES (10*3/UL) IN BLOOD BY AUTOMATED COUNT: 1.1 X10E9/L (ref 0.05–0.8)
MONOCYTES/100 LEUKOCYTES IN BLOOD BY AUTOMATED COUNT: 19.8 % (ref 2–10)
NEUTROPHILS (10*3/UL) IN BLOOD BY AUTOMATED COUNT: 3.4 X10E9/L (ref 1.6–5.5)
NEUTROPHILS/100 LEUKOCYTES IN BLOOD BY AUTOMATED COUNT: 61.1 % (ref 40–80)
PLATELETS (10*3/UL) IN BLOOD AUTOMATED COUNT: 220 X10E9/L (ref 150–450)

## 2023-07-14 PROCEDURE — 85025 COMPLETE CBC W/AUTO DIFF WBC: CPT

## 2023-07-14 PROCEDURE — 36415 COLL VENOUS BLD VENIPUNCTURE: CPT

## 2023-08-01 ENCOUNTER — LAB (OUTPATIENT)
Dept: LAB | Facility: LAB | Age: 78
End: 2023-08-01
Payer: MEDICARE

## 2023-08-01 DIAGNOSIS — D64.9 SEVERE ANEMIA: ICD-10-CM

## 2023-08-01 DIAGNOSIS — D50.9 IRON DEFICIENCY ANEMIA, UNSPECIFIED IRON DEFICIENCY ANEMIA TYPE: ICD-10-CM

## 2023-08-01 LAB
BASOPHILS (10*3/UL) IN BLOOD BY AUTOMATED COUNT: 0.03 X10E9/L (ref 0–0.1)
BASOPHILS/100 LEUKOCYTES IN BLOOD BY AUTOMATED COUNT: 0.7 % (ref 0–2)
EOSINOPHILS (10*3/UL) IN BLOOD BY AUTOMATED COUNT: 0.11 X10E9/L (ref 0–0.4)
EOSINOPHILS/100 LEUKOCYTES IN BLOOD BY AUTOMATED COUNT: 2.4 % (ref 0–6)
ERYTHROCYTE DISTRIBUTION WIDTH (RATIO) BY AUTOMATED COUNT: 15.6 % (ref 11.5–14.5)
ERYTHROCYTE MEAN CORPUSCULAR HEMOGLOBIN CONCENTRATION (G/DL) BY AUTOMATED: 30.9 G/DL (ref 32–36)
ERYTHROCYTE MEAN CORPUSCULAR VOLUME (FL) BY AUTOMATED COUNT: 100 FL (ref 80–100)
ERYTHROCYTES (10*6/UL) IN BLOOD BY AUTOMATED COUNT: 4.32 X10E12/L (ref 4.5–5.9)
HEMATOCRIT (%) IN BLOOD BY AUTOMATED COUNT: 43.3 % (ref 41–52)
HEMOGLOBIN (G/DL) IN BLOOD: 13.4 G/DL (ref 13.5–17.5)
IMMATURE GRANULOCYTES/100 LEUKOCYTES IN BLOOD BY AUTOMATED COUNT: 0.9 % (ref 0–0.9)
LEUKOCYTES (10*3/UL) IN BLOOD BY AUTOMATED COUNT: 4.6 X10E9/L (ref 4.4–11.3)
LYMPHOCYTES (10*3/UL) IN BLOOD BY AUTOMATED COUNT: 0.99 X10E9/L (ref 0.8–3)
LYMPHOCYTES/100 LEUKOCYTES IN BLOOD BY AUTOMATED COUNT: 21.5 % (ref 13–44)
MONOCYTES (10*3/UL) IN BLOOD BY AUTOMATED COUNT: 1.12 X10E9/L (ref 0.05–0.8)
MONOCYTES/100 LEUKOCYTES IN BLOOD BY AUTOMATED COUNT: 24.3 % (ref 2–10)
NEUTROPHILS (10*3/UL) IN BLOOD BY AUTOMATED COUNT: 2.32 X10E9/L (ref 1.6–5.5)
NEUTROPHILS/100 LEUKOCYTES IN BLOOD BY AUTOMATED COUNT: 50.2 % (ref 40–80)
PLATELETS (10*3/UL) IN BLOOD AUTOMATED COUNT: 221 X10E9/L (ref 150–450)

## 2023-08-01 PROCEDURE — 85025 COMPLETE CBC W/AUTO DIFF WBC: CPT

## 2023-08-01 PROCEDURE — 36415 COLL VENOUS BLD VENIPUNCTURE: CPT

## 2023-08-30 DIAGNOSIS — M10.9 GOUT, UNSPECIFIED CAUSE, UNSPECIFIED CHRONICITY, UNSPECIFIED SITE: ICD-10-CM

## 2023-08-31 RX ORDER — ALLOPURINOL 100 MG/1
200 TABLET ORAL DAILY
Qty: 180 TABLET | Refills: 1 | Status: SHIPPED | OUTPATIENT
Start: 2023-08-31 | End: 2024-02-19

## 2023-09-01 ENCOUNTER — LAB (OUTPATIENT)
Dept: LAB | Facility: LAB | Age: 78
End: 2023-09-01
Payer: MEDICARE

## 2023-09-01 DIAGNOSIS — D64.9 SEVERE ANEMIA: ICD-10-CM

## 2023-09-01 DIAGNOSIS — D50.9 IRON DEFICIENCY ANEMIA, UNSPECIFIED IRON DEFICIENCY ANEMIA TYPE: ICD-10-CM

## 2023-09-01 LAB
BASOPHILS (10*3/UL) IN BLOOD BY AUTOMATED COUNT: 0.02 X10E9/L (ref 0–0.1)
BASOPHILS/100 LEUKOCYTES IN BLOOD BY AUTOMATED COUNT: 0.4 % (ref 0–2)
EOSINOPHILS (10*3/UL) IN BLOOD BY AUTOMATED COUNT: 0.09 X10E9/L (ref 0–0.4)
EOSINOPHILS/100 LEUKOCYTES IN BLOOD BY AUTOMATED COUNT: 2 % (ref 0–6)
ERYTHROCYTE DISTRIBUTION WIDTH (RATIO) BY AUTOMATED COUNT: 15.5 % (ref 11.5–14.5)
ERYTHROCYTE MEAN CORPUSCULAR HEMOGLOBIN CONCENTRATION (G/DL) BY AUTOMATED: 32.2 G/DL (ref 32–36)
ERYTHROCYTE MEAN CORPUSCULAR VOLUME (FL) BY AUTOMATED COUNT: 98 FL (ref 80–100)
ERYTHROCYTES (10*6/UL) IN BLOOD BY AUTOMATED COUNT: 4.27 X10E12/L (ref 4.5–5.9)
HEMATOCRIT (%) IN BLOOD BY AUTOMATED COUNT: 41.9 % (ref 41–52)
HEMOGLOBIN (G/DL) IN BLOOD: 13.5 G/DL (ref 13.5–17.5)
IMMATURE GRANULOCYTES/100 LEUKOCYTES IN BLOOD BY AUTOMATED COUNT: 0.4 % (ref 0–0.9)
LEUKOCYTES (10*3/UL) IN BLOOD BY AUTOMATED COUNT: 4.6 X10E9/L (ref 4.4–11.3)
LYMPHOCYTES (10*3/UL) IN BLOOD BY AUTOMATED COUNT: 0.91 X10E9/L (ref 0.8–3)
LYMPHOCYTES/100 LEUKOCYTES IN BLOOD BY AUTOMATED COUNT: 20 % (ref 13–44)
MONOCYTES (10*3/UL) IN BLOOD BY AUTOMATED COUNT: 0.86 X10E9/L (ref 0.05–0.8)
MONOCYTES/100 LEUKOCYTES IN BLOOD BY AUTOMATED COUNT: 18.9 % (ref 2–10)
NEUTROPHILS (10*3/UL) IN BLOOD BY AUTOMATED COUNT: 2.66 X10E9/L (ref 1.6–5.5)
NEUTROPHILS/100 LEUKOCYTES IN BLOOD BY AUTOMATED COUNT: 58.3 % (ref 40–80)
PLATELETS (10*3/UL) IN BLOOD AUTOMATED COUNT: 223 X10E9/L (ref 150–450)

## 2023-09-01 PROCEDURE — 85025 COMPLETE CBC W/AUTO DIFF WBC: CPT

## 2023-09-01 PROCEDURE — 36415 COLL VENOUS BLD VENIPUNCTURE: CPT

## 2023-09-14 ENCOUNTER — OFFICE VISIT (OUTPATIENT)
Dept: PRIMARY CARE | Facility: CLINIC | Age: 78
End: 2023-09-14
Payer: MEDICARE

## 2023-09-14 VITALS
SYSTOLIC BLOOD PRESSURE: 124 MMHG | HEART RATE: 68 BPM | TEMPERATURE: 98.3 F | BODY MASS INDEX: 39.26 KG/M2 | WEIGHT: 262 LBS | DIASTOLIC BLOOD PRESSURE: 74 MMHG

## 2023-09-14 DIAGNOSIS — K86.2 PANCREATIC CYST (HHS-HCC): ICD-10-CM

## 2023-09-14 DIAGNOSIS — E78.2 MIXED HYPERLIPIDEMIA: ICD-10-CM

## 2023-09-14 DIAGNOSIS — I38: ICD-10-CM

## 2023-09-14 DIAGNOSIS — E11.21 DIABETES MELLITUS WITH KIDNEY DISEASE (MULTI): Primary | ICD-10-CM

## 2023-09-14 DIAGNOSIS — Z95.818 S/P MITRAL VALVE CLIP IMPLANTATION: ICD-10-CM

## 2023-09-14 DIAGNOSIS — I50.20: ICD-10-CM

## 2023-09-14 DIAGNOSIS — Z98.890 S/P MITRAL VALVE CLIP IMPLANTATION: ICD-10-CM

## 2023-09-14 PROCEDURE — 1036F TOBACCO NON-USER: CPT | Performed by: INTERNAL MEDICINE

## 2023-09-14 PROCEDURE — 1160F RVW MEDS BY RX/DR IN RCRD: CPT | Performed by: INTERNAL MEDICINE

## 2023-09-14 PROCEDURE — 99214 OFFICE O/P EST MOD 30 MIN: CPT | Performed by: INTERNAL MEDICINE

## 2023-09-14 PROCEDURE — 3074F SYST BP LT 130 MM HG: CPT | Performed by: INTERNAL MEDICINE

## 2023-09-14 PROCEDURE — 1159F MED LIST DOCD IN RCRD: CPT | Performed by: INTERNAL MEDICINE

## 2023-09-14 PROCEDURE — 1157F ADVNC CARE PLAN IN RCRD: CPT | Performed by: INTERNAL MEDICINE

## 2023-09-14 PROCEDURE — 3078F DIAST BP <80 MM HG: CPT | Performed by: INTERNAL MEDICINE

## 2023-09-14 ASSESSMENT — ENCOUNTER SYMPTOMS
FLANK PAIN: 0
RESPIRATORY NEGATIVE: 1
CONSTITUTIONAL NEGATIVE: 1
GASTROINTESTINAL NEGATIVE: 1
CARDIOVASCULAR NEGATIVE: 1
PSYCHIATRIC NEGATIVE: 1
NEUROLOGICAL NEGATIVE: 1
DIFFICULTY URINATING: 0
PALPITATIONS: 0

## 2023-09-14 NOTE — PROGRESS NOTES
Subjective   Patient ID: Nilo Benavidez is a 78 y.o. male who presents for Follow-up (Pt here for OV and lab results).    HPI   Patient here for office visit he has history of type 2 diabetes blood sugars look good now with 4 mg glimepiride.  Patient denies any low blood sugar reactions.  He is on allopurinol for gout and diuretics for his CHF.  Review of Systems   Constitutional: Negative.    HENT: Negative.     Respiratory: Negative.     Cardiovascular: Negative.  Negative for chest pain, palpitations and leg swelling.   Gastrointestinal: Negative.    Genitourinary:  Negative for difficulty urinating and flank pain.   Neurological: Negative.    Psychiatric/Behavioral: Negative.     All other systems reviewed and are negative.      Objective   /74   Pulse 68   Temp 36.8 °C (98.3 °F) (Temporal)   Wt 119 kg (262 lb)   BMI 39.26 kg/m²     Physical Exam  Constitutional:       Appearance: Normal appearance.   HENT:      Head: Normocephalic and atraumatic.   Eyes:      Extraocular Movements: Extraocular movements intact.      Pupils: Pupils are equal, round, and reactive to light.   Cardiovascular:      Rate and Rhythm: Normal rate and regular rhythm.   Pulmonary:      Effort: Pulmonary effort is normal.      Breath sounds: Normal breath sounds.   Abdominal:      Palpations: Abdomen is soft.   Neurological:      General: No focal deficit present.      Mental Status: He is alert and oriented to person, place, and time.   Psychiatric:         Mood and Affect: Mood normal.         Behavior: Behavior normal.         Thought Content: Thought content normal.       Assessment/Plan   Problem List Items Addressed This Visit       Diabetes mellitus with kidney disease (CMS/HCC) - Primary    Heart failure due to valvular disease (CMS/HCC)    Mixed hyperlipidemia    S/P mitral valve clip implantation    Pancreatic cyst     Patient is seen via heme-onc for splenomegaly he does have a cyst in the pancreas which is here  needs more work-up and he is being scheduled for MRI but due to the fact that he has pacemaker they want to do it in [facility].  Other options can be endoscopic ultrasound.  Patient will continue carvedilol for CHF and hypertension and torsemide for CHF as well along with Zaroxolyn.  Continue statins for dyslipidemia continue spironolactone as well for CHF we will continue losartan for CHF and hypertension.  Complexity of medical decision making moderate his last hemoglobin was normal at 13.5 creatinine stable at 1.74 stage III chronic kidney disease.

## 2023-10-04 ENCOUNTER — HOSPITAL ENCOUNTER (OUTPATIENT)
Dept: CARDIOLOGY | Facility: HOSPITAL | Age: 78
Discharge: HOME | End: 2023-10-04
Payer: MEDICARE

## 2023-10-04 DIAGNOSIS — I47.29 PAROXYSMAL VENTRICULAR TACHYCARDIA (MULTI): ICD-10-CM

## 2023-10-04 DIAGNOSIS — Z95.810 PRESENCE OF AUTOMATIC CARDIOVERTER/DEFIBRILLATOR (AICD): ICD-10-CM

## 2023-10-04 PROCEDURE — 93295 DEV INTERROG REMOTE 1/2/MLT: CPT | Performed by: INTERNAL MEDICINE

## 2023-10-04 PROCEDURE — 93296 REM INTERROG EVL PM/IDS: CPT

## 2023-10-12 ENCOUNTER — TELEPHONE (OUTPATIENT)
Dept: HEMATOLOGY/ONCOLOGY | Facility: CLINIC | Age: 78
End: 2023-10-12
Payer: MEDICARE

## 2023-10-12 NOTE — TELEPHONE ENCOUNTER
I left a message for Josue on his identifiable answering machine. Office number was left and I encouraged him to call back to discuss ordering an alternative CT scan as the MRI is not possible. I will tell him all information when I hear back.

## 2023-10-12 NOTE — TELEPHONE ENCOUNTER
I spoke with Josue about his CT scan. I did let him know that he cannot do the MRI due to the pacemaker. He is aware that we will plan for a CT scan pancreas protocol and follow up with Dr. Garcia a few days later. He was very appreciative and is aware that he will be getting a call from our team with date and time.

## 2023-10-13 DIAGNOSIS — K86.2 PANCREATIC CYST (HHS-HCC): Primary | ICD-10-CM

## 2023-10-16 ENCOUNTER — LAB (OUTPATIENT)
Dept: LAB | Facility: LAB | Age: 78
End: 2023-10-16
Payer: MEDICARE

## 2023-10-16 DIAGNOSIS — D50.9 IRON DEFICIENCY ANEMIA, UNSPECIFIED IRON DEFICIENCY ANEMIA TYPE: ICD-10-CM

## 2023-10-16 DIAGNOSIS — D64.9 SEVERE ANEMIA: ICD-10-CM

## 2023-10-16 LAB
BASOPHILS # BLD AUTO: 0.02 X10*3/UL (ref 0–0.1)
BASOPHILS NFR BLD AUTO: 0.4 %
EOSINOPHIL # BLD AUTO: 0.08 X10*3/UL (ref 0–0.4)
EOSINOPHIL NFR BLD AUTO: 1.7 %
ERYTHROCYTE [DISTWIDTH] IN BLOOD BY AUTOMATED COUNT: 15.1 % (ref 11.5–14.5)
HCT VFR BLD AUTO: 40.7 % (ref 41–52)
HGB BLD-MCNC: 13.2 G/DL (ref 13.5–17.5)
IMM GRANULOCYTES # BLD AUTO: 0.09 X10*3/UL (ref 0–0.5)
IMM GRANULOCYTES NFR BLD AUTO: 1.9 % (ref 0–0.9)
LYMPHOCYTES # BLD AUTO: 0.97 X10*3/UL (ref 0.8–3)
LYMPHOCYTES NFR BLD AUTO: 20.5 %
MCH RBC QN AUTO: 31.6 PG (ref 26–34)
MCHC RBC AUTO-ENTMCNC: 32.4 G/DL (ref 32–36)
MCV RBC AUTO: 97 FL (ref 80–100)
MONOCYTES # BLD AUTO: 1.09 X10*3/UL (ref 0.05–0.8)
MONOCYTES NFR BLD AUTO: 23 %
NEUTROPHILS # BLD AUTO: 2.48 X10*3/UL (ref 1.6–5.5)
NEUTROPHILS NFR BLD AUTO: 52.5 %
NRBC BLD-RTO: 0 /100 WBCS (ref 0–0)
PLATELET # BLD AUTO: 218 X10*3/UL (ref 150–450)
PMV BLD AUTO: 12.7 FL (ref 7.5–11.5)
RBC # BLD AUTO: 4.18 X10*6/UL (ref 4.5–5.9)
WBC # BLD AUTO: 4.7 X10*3/UL (ref 4.4–11.3)

## 2023-10-16 PROCEDURE — 36415 COLL VENOUS BLD VENIPUNCTURE: CPT

## 2023-10-16 PROCEDURE — 85025 COMPLETE CBC W/AUTO DIFF WBC: CPT

## 2023-10-27 ENCOUNTER — HOSPITAL ENCOUNTER (OUTPATIENT)
Dept: RADIOLOGY | Facility: HOSPITAL | Age: 78
Discharge: HOME | End: 2023-10-27
Payer: MEDICARE

## 2023-10-27 DIAGNOSIS — K86.2 PANCREATIC CYST (HHS-HCC): ICD-10-CM

## 2023-10-27 PROCEDURE — 74150 CT ABDOMEN W/O CONTRAST: CPT | Mod: MG

## 2023-10-27 PROCEDURE — 74150 CT ABDOMEN W/O CONTRAST: CPT | Mod: FOREIGN READ | Performed by: RADIOLOGY

## 2023-11-02 ENCOUNTER — TELEPHONE (OUTPATIENT)
Dept: HEMATOLOGY/ONCOLOGY | Facility: CLINIC | Age: 78
End: 2023-11-02
Payer: MEDICARE

## 2023-11-02 DIAGNOSIS — K86.2 PANCREATIC CYST (HHS-HCC): Primary | ICD-10-CM

## 2023-11-02 NOTE — TELEPHONE ENCOUNTER
"I spoke with Nilo about his CT scan. Per Dr. Garcia \" it is likely a cyst and it is not changing in size. I will plan for another CT scan in 6 month with a FUV with me 1-2 days after.\" I told Nilo all information and all questions were answered. He was very appreciative and will call us if he does not hear from scheduling.   "

## 2023-11-06 ENCOUNTER — LAB (OUTPATIENT)
Dept: LAB | Facility: LAB | Age: 78
End: 2023-11-06
Payer: MEDICARE

## 2023-11-06 DIAGNOSIS — D64.9 SEVERE ANEMIA: ICD-10-CM

## 2023-11-06 DIAGNOSIS — D50.9 IRON DEFICIENCY ANEMIA, UNSPECIFIED IRON DEFICIENCY ANEMIA TYPE: ICD-10-CM

## 2023-11-06 LAB
BASOPHILS # BLD AUTO: 0.02 X10*3/UL (ref 0–0.1)
BASOPHILS NFR BLD AUTO: 0.4 %
EOSINOPHIL # BLD AUTO: 0.07 X10*3/UL (ref 0–0.4)
EOSINOPHIL NFR BLD AUTO: 1.4 %
ERYTHROCYTE [DISTWIDTH] IN BLOOD BY AUTOMATED COUNT: 15.1 % (ref 11.5–14.5)
HCT VFR BLD AUTO: 43.9 % (ref 41–52)
HGB BLD-MCNC: 14.2 G/DL (ref 13.5–17.5)
IMM GRANULOCYTES # BLD AUTO: 0.04 X10*3/UL (ref 0–0.5)
IMM GRANULOCYTES NFR BLD AUTO: 0.8 % (ref 0–0.9)
LYMPHOCYTES # BLD AUTO: 1.09 X10*3/UL (ref 0.8–3)
LYMPHOCYTES NFR BLD AUTO: 21.5 %
MCH RBC QN AUTO: 31.5 PG (ref 26–34)
MCHC RBC AUTO-ENTMCNC: 32.3 G/DL (ref 32–36)
MCV RBC AUTO: 97 FL (ref 80–100)
MONOCYTES # BLD AUTO: 1.15 X10*3/UL (ref 0.05–0.8)
MONOCYTES NFR BLD AUTO: 22.6 %
NEUTROPHILS # BLD AUTO: 2.71 X10*3/UL (ref 1.6–5.5)
NEUTROPHILS NFR BLD AUTO: 53.3 %
NRBC BLD-RTO: 0 /100 WBCS (ref 0–0)
PLATELET # BLD AUTO: 231 X10*3/UL (ref 150–450)
RBC # BLD AUTO: 4.51 X10*6/UL (ref 4.5–5.9)
WBC # BLD AUTO: 5.1 X10*3/UL (ref 4.4–11.3)

## 2023-11-06 PROCEDURE — 36415 COLL VENOUS BLD VENIPUNCTURE: CPT

## 2023-11-06 PROCEDURE — 85025 COMPLETE CBC W/AUTO DIFF WBC: CPT

## 2023-11-16 ENCOUNTER — APPOINTMENT (OUTPATIENT)
Dept: CARDIOLOGY | Facility: CLINIC | Age: 78
End: 2023-11-16
Payer: MEDICARE

## 2023-11-27 DIAGNOSIS — E13.69 OTHER SPECIFIED DIABETES MELLITUS WITH OTHER SPECIFIED COMPLICATION, UNSPECIFIED WHETHER LONG TERM INSULIN USE (MULTI): Primary | ICD-10-CM

## 2023-11-27 RX ORDER — CALCIUM CITRATE/VITAMIN D3 200MG-6.25
TABLET ORAL
Qty: 100 STRIP | Refills: 2 | Status: SHIPPED | OUTPATIENT
Start: 2023-11-27 | End: 2023-12-04 | Stop reason: SDUPTHER

## 2023-11-29 ENCOUNTER — TELEPHONE (OUTPATIENT)
Dept: PRIMARY CARE | Facility: CLINIC | Age: 78
End: 2023-11-29
Payer: MEDICARE

## 2023-12-01 ENCOUNTER — APPOINTMENT (OUTPATIENT)
Dept: CARDIOLOGY | Facility: CLINIC | Age: 78
End: 2023-12-01
Payer: MEDICARE

## 2023-12-04 DIAGNOSIS — E13.69 OTHER SPECIFIED DIABETES MELLITUS WITH OTHER SPECIFIED COMPLICATION, UNSPECIFIED WHETHER LONG TERM INSULIN USE (MULTI): ICD-10-CM

## 2023-12-04 RX ORDER — CALCIUM CITRATE/VITAMIN D3 200MG-6.25
TABLET ORAL
Qty: 100 STRIP | Refills: 3 | Status: SHIPPED | OUTPATIENT
Start: 2023-12-04 | End: 2023-12-11 | Stop reason: SDUPTHER

## 2023-12-05 ENCOUNTER — OFFICE VISIT (OUTPATIENT)
Dept: CARDIOLOGY | Facility: CLINIC | Age: 78
End: 2023-12-05
Payer: MEDICARE

## 2023-12-05 ENCOUNTER — HOSPITAL ENCOUNTER (OUTPATIENT)
Dept: CARDIOLOGY | Facility: HOSPITAL | Age: 78
Discharge: HOME | End: 2023-12-05
Payer: MEDICARE

## 2023-12-05 VITALS
DIASTOLIC BLOOD PRESSURE: 54 MMHG | WEIGHT: 262 LBS | HEIGHT: 69 IN | HEART RATE: 74 BPM | BODY MASS INDEX: 38.8 KG/M2 | SYSTOLIC BLOOD PRESSURE: 102 MMHG

## 2023-12-05 DIAGNOSIS — G47.30 SLEEP APNEA, UNSPECIFIED TYPE: ICD-10-CM

## 2023-12-05 DIAGNOSIS — I10 BENIGN ESSENTIAL HYPERTENSION: ICD-10-CM

## 2023-12-05 DIAGNOSIS — Z95.810 ICD (IMPLANTABLE CARDIOVERTER-DEFIBRILLATOR), BIVENTRICULAR, IN SITU: Primary | ICD-10-CM

## 2023-12-05 DIAGNOSIS — I50.22 CHRONIC SYSTOLIC CONGESTIVE HEART FAILURE (MULTI): ICD-10-CM

## 2023-12-05 DIAGNOSIS — Z95.810 ICD (IMPLANTABLE CARDIOVERTER-DEFIBRILLATOR) IN PLACE: ICD-10-CM

## 2023-12-05 DIAGNOSIS — Z87.891 FORMER CIGARETTE SMOKER: ICD-10-CM

## 2023-12-05 DIAGNOSIS — E78.2 MIXED HYPERLIPIDEMIA: ICD-10-CM

## 2023-12-05 DIAGNOSIS — I47.29 PAROXYSMAL VENTRICULAR TACHYCARDIA (MULTI): ICD-10-CM

## 2023-12-05 DIAGNOSIS — Z71.89 ENCOUNTER FOR MEDICATION REVIEW AND COUNSELING: ICD-10-CM

## 2023-12-05 DIAGNOSIS — I47.10 PAROXYSMAL SVT (SUPRAVENTRICULAR TACHYCARDIA) (CMS-HCC): ICD-10-CM

## 2023-12-05 DIAGNOSIS — I95.9 HYPOTENSION, UNSPECIFIED HYPOTENSION TYPE: ICD-10-CM

## 2023-12-05 DIAGNOSIS — I25.5 CARDIOMYOPATHY, ISCHEMIC: ICD-10-CM

## 2023-12-05 DIAGNOSIS — I44.7 LBBB (LEFT BUNDLE BRANCH BLOCK): ICD-10-CM

## 2023-12-05 DIAGNOSIS — Z71.89 ENCOUNTER TO DISCUSS TREATMENT OPTIONS: ICD-10-CM

## 2023-12-05 DIAGNOSIS — E11.21 DIABETES MELLITUS WITH KIDNEY DISEASE (MULTI): ICD-10-CM

## 2023-12-05 PROBLEM — E66.9 CLASS 2 OBESITY WITH BODY MASS INDEX (BMI) OF 37.0 TO 37.9 IN ADULT: Status: RESOLVED | Noted: 2023-02-26 | Resolved: 2023-12-05

## 2023-12-05 PROBLEM — E66.812 CLASS 2 OBESITY WITH BODY MASS INDEX (BMI) OF 37.0 TO 37.9 IN ADULT: Status: RESOLVED | Noted: 2023-02-26 | Resolved: 2023-12-05

## 2023-12-05 PROCEDURE — 1159F MED LIST DOCD IN RCRD: CPT | Performed by: INTERNAL MEDICINE

## 2023-12-05 PROCEDURE — 3074F SYST BP LT 130 MM HG: CPT | Performed by: INTERNAL MEDICINE

## 2023-12-05 PROCEDURE — 1160F RVW MEDS BY RX/DR IN RCRD: CPT | Performed by: INTERNAL MEDICINE

## 2023-12-05 PROCEDURE — 93000 ELECTROCARDIOGRAM COMPLETE: CPT | Performed by: INTERNAL MEDICINE

## 2023-12-05 PROCEDURE — 3078F DIAST BP <80 MM HG: CPT | Performed by: INTERNAL MEDICINE

## 2023-12-05 PROCEDURE — 99214 OFFICE O/P EST MOD 30 MIN: CPT | Performed by: INTERNAL MEDICINE

## 2023-12-05 PROCEDURE — 1036F TOBACCO NON-USER: CPT | Performed by: INTERNAL MEDICINE

## 2023-12-05 PROCEDURE — 93284 PRGRMG EVAL IMPLANTABLE DFB: CPT

## 2023-12-05 RX ORDER — ACETAMINOPHEN 500 MG
500 TABLET ORAL EVERY 6 HOURS PRN
COMMUNITY

## 2023-12-05 RX ORDER — ALBUTEROL SULFATE 90 UG/1
2 AEROSOL, METERED RESPIRATORY (INHALATION)
COMMUNITY
Start: 2023-05-09

## 2023-12-05 NOTE — PATIENT INSTRUCTIONS
Continue same medications/treatment.  Patient educated on proper medication use.  Patient educated on risk factor modification.  Please bring any lab results from other providers/physicians to your next appointment.    Please bring all medicines, vitamins, and herbal supplements with you when you come to the office.    Prescriptions will not be filled unless you are compliant with your follow up appointments or have a follow up appointment scheduled as per instruction of your physician. Refills should be requested at the time of your visit.    Follow up with Dr. Hsieh in 6 months with device check  Continue with remote checks at 3 and 9 months    CATHERINE HURT RN, AM SCRIBING FOR AND IN THE PRESENCE OF DR. NYDIA HSIEH MD, FACC, FACP, PS

## 2023-12-05 NOTE — PROGRESS NOTES
"Chief Complaint:   Follow-up (Patient presented today for a 1 year follow up./)     History Of Present Illness:    Nilo Benavidez is a 78 y.o. male presenting with followup.   He is accompanied by a friend.    The patient has had cough and congestion for 6 days. He reportedly misread the COVID testing and read the control and not his test.  He has taken Tylenol and Mucinex.    He denies any arrhythmia symptoms.  He denies any dizziness, near syncope, or syncope.    He has acute on chronic cough and shortness of breath.  He denies any fever or myalgias.  Last Recorded Vitals:  Vitals:    12/05/23 1505   BP: 102/54   BP Location: Left arm   Patient Position: Sitting   BP Cuff Size: Adult   Pulse: 74   Weight: 119 kg (262 lb)   Height: 1.74 m (5' 8.5\")       Past Medical History:  See List    Past Surgical History:  See List      Social History:  He reports that he has never smoked. He has never used smokeless tobacco. He reports current alcohol use. He reports current drug use. Drug: Marijuana.    Family History:  Family History   Problem Relation Name Age of Onset    Diabetes Mother      Valvular heart disease Mother      Hypertension Father          Allergies:  Caffeine, Codeine, Lisinopril, Penicillins, and Shellfish containing products    Outpatient Medications:  Current Outpatient Medications   Medication Instructions    acetaminophen (TYLENOL) 500 mg, oral, Every 6 hours PRN    albuterol 90 mcg/actuation inhaler 2 puffs, inhalation, 3 times daily RT    allopurinol (ZYLOPRIM) 200 mg, oral, Daily    ascorbic acid (VITAMIN C) 500 mg, oral, Daily    blood sugar diagnostic (Blood Glucose Test) strip Test twice daily.    blood sugar diagnostic (True Metrix Glucose Test Strip) strip Use 1 strip twice daily.    carvedilol (Coreg) 25 mg tablet 1 tablet, oral, 2 times daily    fluticasone (Flonase) 50 mcg/actuation nasal spray 1 spray, Each Nostril, Daily    gabapentin (NEURONTIN) 300 mg, oral, Daily PRN    glimepiride " (AMARYL) 4 mg, oral, 2 times daily    lancets misc miscellaneous, Use to test once daily as directed.    loratadine (CLARITIN) 10 mg, oral, Daily    losartan (COZAAR) 25 mg, oral, Daily    magnesium oxide (MAG-OX) 400 mg, oral, Daily    metOLazone (ZAROXOLYN) 2.5 mg, oral, Daily PRN    mometasone (Nasonex) 50 mcg/actuation nasal spray 1 spray, Each Nostril, Daily    montelukast (SINGULAIR) 10 mg, oral, Nightly    nitroglycerin (NITROSTAT) 0.4 mg, sublingual, As needed    NON FORMULARY Vitamin D 50 MCG (2000 UT) Oral Tablet; Take PO as directed.    pantoprazole (ProtoNix) 40 mg EC tablet TAKE 1 TABLET BY MOUTH ONCE DAILY    ramelteon (Rozerem) 8 mg tablet 1 tablet, oral, Nightly PRN    simvastatin (Zocor) 20 mg tablet 1 tablet, oral, Nightly    spironolactone (ALDACTONE) 50 mg, oral, Daily    torsemide (Demadex) 100 mg tablet 0.5 tablets, oral, 2 times daily    triamcinolone (Kenalog) 0.1 % cream 1 Application, Topical, 2 times daily, Rub in a thin film to the affected area(s)   Review of Systems   All other systems reviewed and are negative.    Physical Exam:  Constitutional:       Appearance: Normal and healthy appearance. Well-developed and not in distress.   Neck:      Vascular: No JVR. JVD normal.   Pulmonary:      Effort: Pulmonary effort is normal.      Breath sounds: Normal breath sounds. No wheezing. No rhonchi. No rales.   Chest:      Chest wall: Not tender to palpatation.   Cardiovascular:      PMI at left midclavicular line. Normal rate. Regular rhythm. Normal S1. Normal S2.       Murmurs: There is a grade 2/6 systolic murmur.      No gallop.  No click. No rub.   Pulses:     Intact distal pulses.   Edema:     Peripheral edema absent.   Abdominal:      Tenderness: There is no abdominal tenderness.   Musculoskeletal: Normal range of motion.         General: No tenderness. Skin:     General: Skin is warm and dry.   Neurological:      General: No focal deficit present.      Mental Status: Alert and oriented  to person, place and time.   Psychiatric:         Behavior: Behavior is cooperative.            Last Labs:  CBC -  Lab Results   Component Value Date    WBC 5.1 11/06/2023    HGB 14.2 11/06/2023    HCT 43.9 11/06/2023    MCV 97 11/06/2023     11/06/2023       CMP -  Lab Results   Component Value Date    CALCIUM 9.6 08/09/2023    PHOS 3.3 06/07/2023    PROT 7.6 08/09/2023    ALBUMIN 4.3 08/09/2023    AST 22 08/09/2023    ALT 17 08/09/2023    ALKPHOS 71 08/09/2023    BILITOT 0.6 08/09/2023       LIPID PANEL -   Lab Results   Component Value Date    CHOL 112 12/12/2022    TRIG 136 12/12/2022    HDL 31.6 (A) 12/12/2022    CHHDL 3.5 12/12/2022    LDLF 53 12/12/2022    VLDL 27 12/12/2022    NHDL 91 11/30/2020       RENAL FUNCTION PANEL -   Lab Results   Component Value Date    GLUCOSE 167 (H) 08/09/2023     08/09/2023    K 3.7 08/09/2023    CL 99 08/09/2023    CO2 27 08/09/2023    ANIONGAP 16 08/09/2023    BUN 38 (H) 08/09/2023    CREATININE 1.74 (H) 08/09/2023    GFRMALE 39 (A) 08/09/2023    CALCIUM 9.6 08/09/2023    PHOS 3.3 06/07/2023    ALBUMIN 4.3 08/09/2023        Lab Results   Component Value Date     (H) 04/03/2022    HGBA1C 5.9 (A) 12/06/2022       Last Cardiology Tests:see  ECG: Today. Appropriate sensing and pacing.  PVC.  Right axis deviation. Paced QT interval 460 ms       Device Check: Today Medtronic [id number]. Estimated longevity device 1  year 8 months. 5 brief NSVT. 0% A-fib. 98.7% CRT pacing.  Normal Optivol             Lab review: I have personally reviewed the laboratory result(s) see above    Assessment/Plan   Diagnoses and all orders for this visit:  ICD (implantable cardioverter-defibrillator), biventricular, in situ  Chronic systolic congestive heart failure (CMS/HCC)  Paroxysmal SVT (supraventricular tachycardia)  Hypotension, unspecified hypotension type  Sleep apnea, unspecified type  Mixed hyperlipidemia  Benign essential hypertension  LBBB (left bundle branch  block)  Diabetes mellitus with kidney disease (CMS/[facility])  Former cigarette smoker  BMI 39.0-39.9,adult  Encounter for medication review and counseling  Encounter to discuss treatment options    Gina Tello RN      Chronic systolic heart failure. Refractory heart failure and LBBB, s/p upgrade to biventricular device in 2008 and generator change in 2016. Stable.   Sinus node dysfunction Chronotropic incompetence. Walk test performed in past with adjustment of sensor. No adjustment in sensor needed currently.  Medtronic [id number] biventricular device. On field alert.  Previously adjusted factors.  Discussed mechanical complication of device. Reviewed device check with patient and friend.  Ordered device checks.  Continue to alternate remote checks with device clinic paired with EP office visit.  History of new RV lead due to RV fracture. Chronic. Stable with prior lead capped.  Recurrent ventricular tachycardia. Chronic. Stable. Reviewed medications. Continue meds. Discussed refills.  Ischemic cardiomyopathy. Chronic. Stable. LVEF 30% by echo 10/18. Lexiscan LVEF 25%. Presence of scar and substrate for VT. Medications reviewed. Continue same medications. Refills  New [place] Heart Association 2C heart failure. Chronic. Stable. Chronic systolic and diastolic heart failure since 2003. Reviewed medications. Continue same medications. Refills discussed.  PSVT. Chronic. Stable. No recent episodes. Reviewed medications. Continue meds. Discussed refills.  Coronary artery disease. Stable, chronic. Remote percutaneous coronary intervention and stents. Remote myocardial infarction. Asymptomatic. Reviewed medications. Continue medications. Discussed refills.  Hypertension, benign, chronic, controlled. Stable. Reviewed medications. Continue medications. Discussed refills.  Valvular heart disease. Chronic. Stable. Reviewed echocardiogram and procedure note.  Moderate to severe mitral regurgitation by echocardiogram 2023.     Pancreatic?  Cyst.  Undergoing surveillance scans.  Likely viral syndrome by history.  Supportive care.  Follow-up PCP  Mild hypokalemia. Improved.  CKD stage III. Chronic. Stable. Reviewed medications.  Obstructive sleep apnea, chronic. Stable. On CPAP. Discussed association of sleep apnea and arrhythmias. Reenforced compliance with CPAP.   Diabetes mellitus. Chronic. Stable.  Chronic back, right lower leg pain and knee swelling, stable. Severe osteoarthritis. Ambulates with cane. Follows with pain clinic.  Colonic polyps, Chronic. Stable. Reviewed medications. Tolerates aspirin.   Hyperlipidemia, chronic. Stable. Reviewed medications. On statin. Discussed potential adverse effects of medications. Reviewed blood work.  Overweight.      AHA recommendations for exercise, diet, and behavioral modification reviewed with pt.     The patient and I and son discussed the mechanism of arrhythmia, ECG, good alert device, device check, device programming, previous lead fracture, standard of care for device follow-up, indications for and types of medications, discussion if and what medication refills needed, treatment options, risks, benefits, and imponderables. American Heart Association lifestyle changes and behavioral modification discussed. All questions answered in detail. Counseling over 50% visit regarding above. Patient appreciative of care.

## 2023-12-08 ENCOUNTER — OFFICE VISIT (OUTPATIENT)
Dept: CARDIOLOGY | Facility: CLINIC | Age: 78
End: 2023-12-08
Payer: MEDICARE

## 2023-12-08 ENCOUNTER — LAB (OUTPATIENT)
Dept: LAB | Facility: LAB | Age: 78
End: 2023-12-08
Payer: MEDICARE

## 2023-12-08 VITALS
HEIGHT: 69 IN | BODY MASS INDEX: 39.37 KG/M2 | WEIGHT: 265.8 LBS | HEART RATE: 68 BPM | DIASTOLIC BLOOD PRESSURE: 70 MMHG | SYSTOLIC BLOOD PRESSURE: 120 MMHG

## 2023-12-08 DIAGNOSIS — Z86.79 HISTORY OF ISCHEMIC CARDIOMYOPATHY: ICD-10-CM

## 2023-12-08 DIAGNOSIS — I47.10 PAROXYSMAL SVT (SUPRAVENTRICULAR TACHYCARDIA) (CMS-HCC): ICD-10-CM

## 2023-12-08 DIAGNOSIS — I10 ESSENTIAL HYPERTENSION, BENIGN: ICD-10-CM

## 2023-12-08 DIAGNOSIS — Z95.818 S/P MITRAL VALVE CLIP IMPLANTATION: ICD-10-CM

## 2023-12-08 DIAGNOSIS — E11.21 DIABETES MELLITUS WITH KIDNEY DISEASE (MULTI): ICD-10-CM

## 2023-12-08 DIAGNOSIS — Z98.890 S/P MITRAL VALVE CLIP IMPLANTATION: ICD-10-CM

## 2023-12-08 DIAGNOSIS — G47.30 SLEEP APNEA, UNSPECIFIED TYPE: ICD-10-CM

## 2023-12-08 DIAGNOSIS — D50.9 IRON DEFICIENCY ANEMIA, UNSPECIFIED IRON DEFICIENCY ANEMIA TYPE: ICD-10-CM

## 2023-12-08 DIAGNOSIS — I34.0 NONRHEUMATIC MITRAL VALVE REGURGITATION: Primary | ICD-10-CM

## 2023-12-08 DIAGNOSIS — D64.9 SEVERE ANEMIA: ICD-10-CM

## 2023-12-08 DIAGNOSIS — Z87.891 FORMER CIGARETTE SMOKER: ICD-10-CM

## 2023-12-08 DIAGNOSIS — I25.10 ASHD (ARTERIOSCLEROTIC HEART DISEASE): ICD-10-CM

## 2023-12-08 DIAGNOSIS — E78.2 MIXED HYPERLIPIDEMIA: ICD-10-CM

## 2023-12-08 LAB
ALBUMIN SERPL BCP-MCNC: 4.1 G/DL (ref 3.4–5)
ALP SERPL-CCNC: 68 U/L (ref 33–136)
ALT SERPL W P-5'-P-CCNC: 19 U/L (ref 10–52)
ANION GAP SERPL CALC-SCNC: 12 MMOL/L (ref 10–20)
AST SERPL W P-5'-P-CCNC: 26 U/L (ref 9–39)
BASOPHILS # BLD AUTO: 0.02 X10*3/UL (ref 0–0.1)
BASOPHILS NFR BLD AUTO: 0.3 %
BILIRUB SERPL-MCNC: 0.4 MG/DL (ref 0–1.2)
BUN SERPL-MCNC: 25 MG/DL (ref 6–23)
CALCIUM SERPL-MCNC: 9.6 MG/DL (ref 8.6–10.3)
CHLORIDE SERPL-SCNC: 96 MMOL/L (ref 98–107)
CO2 SERPL-SCNC: 33 MMOL/L (ref 21–32)
CREAT SERPL-MCNC: 1.65 MG/DL (ref 0.5–1.3)
EOSINOPHIL # BLD AUTO: 0.15 X10*3/UL (ref 0–0.4)
EOSINOPHIL NFR BLD AUTO: 2.4 %
ERYTHROCYTE [DISTWIDTH] IN BLOOD BY AUTOMATED COUNT: 15.2 % (ref 11.5–14.5)
GFR SERPL CREATININE-BSD FRML MDRD: 42 ML/MIN/1.73M*2
GLUCOSE SERPL-MCNC: 104 MG/DL (ref 74–99)
HCT VFR BLD AUTO: 41.8 % (ref 41–52)
HGB BLD-MCNC: 13.3 G/DL (ref 13.5–17.5)
IMM GRANULOCYTES # BLD AUTO: 0.26 X10*3/UL (ref 0–0.5)
IMM GRANULOCYTES NFR BLD AUTO: 4.1 % (ref 0–0.9)
LYMPHOCYTES # BLD AUTO: 1.18 X10*3/UL (ref 0.8–3)
LYMPHOCYTES NFR BLD AUTO: 18.5 %
MCH RBC QN AUTO: 31.9 PG (ref 26–34)
MCHC RBC AUTO-ENTMCNC: 31.8 G/DL (ref 32–36)
MCV RBC AUTO: 100 FL (ref 80–100)
MONOCYTES # BLD AUTO: 0.93 X10*3/UL (ref 0.05–0.8)
MONOCYTES NFR BLD AUTO: 14.6 %
NEUTROPHILS # BLD AUTO: 3.83 X10*3/UL (ref 1.6–5.5)
NEUTROPHILS NFR BLD AUTO: 60.1 %
NRBC BLD-RTO: 0 /100 WBCS (ref 0–0)
PLATELET # BLD AUTO: 279 X10*3/UL (ref 150–450)
POTASSIUM SERPL-SCNC: 4 MMOL/L (ref 3.5–5.3)
PROT SERPL-MCNC: 7.6 G/DL (ref 6.4–8.2)
RBC # BLD AUTO: 4.17 X10*6/UL (ref 4.5–5.9)
SODIUM SERPL-SCNC: 137 MMOL/L (ref 136–145)
WBC # BLD AUTO: 6.4 X10*3/UL (ref 4.4–11.3)

## 2023-12-08 PROCEDURE — 3078F DIAST BP <80 MM HG: CPT | Performed by: INTERNAL MEDICINE

## 2023-12-08 PROCEDURE — 1036F TOBACCO NON-USER: CPT | Performed by: INTERNAL MEDICINE

## 2023-12-08 PROCEDURE — 99214 OFFICE O/P EST MOD 30 MIN: CPT | Performed by: INTERNAL MEDICINE

## 2023-12-08 PROCEDURE — 1160F RVW MEDS BY RX/DR IN RCRD: CPT | Performed by: INTERNAL MEDICINE

## 2023-12-08 PROCEDURE — 83036 HEMOGLOBIN GLYCOSYLATED A1C: CPT

## 2023-12-08 PROCEDURE — 36415 COLL VENOUS BLD VENIPUNCTURE: CPT

## 2023-12-08 PROCEDURE — 85025 COMPLETE CBC W/AUTO DIFF WBC: CPT

## 2023-12-08 PROCEDURE — 1159F MED LIST DOCD IN RCRD: CPT | Performed by: INTERNAL MEDICINE

## 2023-12-08 PROCEDURE — 80053 COMPREHEN METABOLIC PANEL: CPT

## 2023-12-08 PROCEDURE — 3074F SYST BP LT 130 MM HG: CPT | Performed by: INTERNAL MEDICINE

## 2023-12-08 NOTE — PROGRESS NOTES
Referred by Dr. Medina ref. provider found provider found for   Chief Complaint   Patient presents with    Follow-up     2 month        History of Present Illness  Nilo Benavidez is a 78 y.o. year old male patient with a history of moderate to severe mitral regurgitation.  He continues to be asymptomatic at this point denies any weight gain denies any leg swelling denies any shortness of breath he does lay down flat without any problem.  I did have a long lengthy discussion with the patient about the potential fixing of the mitral valve with the clips however at this point would like to wait.  Will repeat echocardiogram to evaluate the chamber sizes which will be done in 6 months and follow-up with me after the echo is done we talked about calling me back if he has any symptoms start    Past Medical History  Past Medical History:   Diagnosis Date    Other ventricular tachycardia (CMS/HCC)     Ventricular tachycardia (paroxysmal)    Personal history of diseases of the blood and blood-forming organs and certain disorders involving the immune mechanism 2021    History of bleeding disorder    Personal history of other diseases of the circulatory system 2021    History of cardiac disorder    Personal history of other diseases of the musculoskeletal system and connective tissue 2021    History of arthritis    Personal history of other specified conditions 2021    History of chest pain       Social History  Social History     Tobacco Use    Smoking status: Former     Packs/day: 0.50     Years: 43.00     Additional pack years: 0.00     Total pack years: 21.50     Types: Cigarettes     Quit date:      Years since quittin.9    Smokeless tobacco: Never   Substance Use Topics    Alcohol use: Yes     Comment: social, 1x monthly    Drug use: Never       Family History     Family History   Problem Relation Name Age of Onset    Diabetes Mother      Valvular heart disease Mother      Hypertension Father          Review of Systems  As per HPI, all other systems reviewed and negative.    Allergies:  Allergies   Allergen Reactions    Caffeine Other     Hypotension    Codeine Unknown     Codeine Derivatives    Lisinopril Unknown    Penicillins Unknown    Shellfish Containing Products Unknown        Outpatient Medications:  Current Outpatient Medications   Medication Instructions    acetaminophen (TYLENOL) 500 mg, oral, Every 6 hours PRN    albuterol 90 mcg/actuation inhaler 2 puffs, inhalation, 3 times daily RT    allopurinol (ZYLOPRIM) 200 mg, oral, Daily    ascorbic acid (VITAMIN C) 500 mg, oral, Daily    blood sugar diagnostic (Blood Glucose Test) strip Test twice daily.    blood sugar diagnostic (True Metrix Glucose Test Strip) strip Use 1 strip twice daily.    carvedilol (Coreg) 25 mg tablet 1 tablet, oral, 2 times daily    fluticasone (Flonase) 50 mcg/actuation nasal spray 1 spray, Each Nostril, Daily    gabapentin (NEURONTIN) 300 mg, oral, Daily PRN    glimepiride (AMARYL) 4 mg, oral, 2 times daily    lancets misc miscellaneous, Use to test once daily as directed.    losartan (COZAAR) 25 mg, oral, Daily    magnesium oxide (MAG-OX) 400 mg, oral, Daily    metOLazone (ZAROXOLYN) 2.5 mg, oral, Daily PRN    montelukast (SINGULAIR) 10 mg, oral, Nightly    nitroglycerin (NITROSTAT) 0.4 mg, sublingual, As needed    NON FORMULARY Vitamin D 50 MCG (2000 UT) Oral Tablet; Take PO as directed.    pantoprazole (ProtoNix) 40 mg EC tablet TAKE 1 TABLET BY MOUTH ONCE DAILY    ramelteon (Rozerem) 8 mg tablet 1 tablet, oral, Nightly PRN    simvastatin (Zocor) 20 mg tablet 1 tablet, oral, Nightly    spironolactone (ALDACTONE) 50 mg, oral, Daily    torsemide (Demadex) 100 mg tablet 0.5 tablets, oral, 2 times daily    triamcinolone (Kenalog) 0.1 % cream 1 Application, Topical, 2 times daily, Rub in a thin film to the affected area(s)         Vitals:  Vitals:    12/08/23 1458   BP: 120/70   Pulse: 68       Physical Exam:  Physical  Exam  Cardiovascular:      Rate and Rhythm: Normal rate.      Pulses: Normal pulses.      Heart sounds: Normal heart sounds.   Pulmonary:      Effort: Pulmonary effort is normal.   Neurological:      General: No focal deficit present.      Mental Status: He is alert.             Assessment/Plan   Problem List Items Addressed This Visit       ASHD (arteriosclerotic heart disease)    Mitral regurgitation - Primary    Diabetes mellitus with kidney disease (CMS/HCC)    History of ischemic cardiomyopathy    Mixed hyperlipidemia    Paroxysmal SVT (supraventricular tachycardia)    S/P mitral valve clip implantation    Sleep apnea    Essential hypertension, benign    Former cigarette smoker    BMI 39.0-39.9,adult           Bhanu Ricci MD [facility]  Interventional Cardiology   of [facility]     Thank you for allowing me to participate in the care of this patient. Please do not hesitate to contact me with any further questions or concerns.

## 2023-12-09 LAB
EST. AVERAGE GLUCOSE BLD GHB EST-MCNC: 126 MG/DL
HBA1C MFR BLD: 6 %

## 2023-12-11 DIAGNOSIS — E13.69 OTHER SPECIFIED DIABETES MELLITUS WITH OTHER SPECIFIED COMPLICATION, UNSPECIFIED WHETHER LONG TERM INSULIN USE (MULTI): ICD-10-CM

## 2023-12-11 RX ORDER — CALCIUM CITRATE/VITAMIN D3 200MG-6.25
TABLET ORAL
Qty: 100 STRIP | Refills: 3 | Status: SHIPPED | OUTPATIENT
Start: 2023-12-11

## 2023-12-14 ENCOUNTER — OFFICE VISIT (OUTPATIENT)
Dept: PRIMARY CARE | Facility: CLINIC | Age: 78
End: 2023-12-14
Payer: MEDICARE

## 2023-12-14 VITALS
TEMPERATURE: 97.8 F | WEIGHT: 264 LBS | HEART RATE: 64 BPM | BODY MASS INDEX: 39.56 KG/M2 | SYSTOLIC BLOOD PRESSURE: 122 MMHG | DIASTOLIC BLOOD PRESSURE: 70 MMHG

## 2023-12-14 DIAGNOSIS — N18.30 ANEMIA IN STAGE 3 CHRONIC KIDNEY DISEASE, UNSPECIFIED WHETHER STAGE 3A OR 3B CKD (MULTI): ICD-10-CM

## 2023-12-14 DIAGNOSIS — J20.9 ACUTE BRONCHITIS, UNSPECIFIED ORGANISM: Primary | ICD-10-CM

## 2023-12-14 DIAGNOSIS — D63.1 ANEMIA IN STAGE 3 CHRONIC KIDNEY DISEASE, UNSPECIFIED WHETHER STAGE 3A OR 3B CKD (MULTI): ICD-10-CM

## 2023-12-14 DIAGNOSIS — I34.0 SEVERE MITRAL REGURGITATION: ICD-10-CM

## 2023-12-14 DIAGNOSIS — M12.9 ARTHRITIS, MULTIPLE JOINT INVOLVEMENT: ICD-10-CM

## 2023-12-14 DIAGNOSIS — N18.30 STAGE 3 CHRONIC KIDNEY DISEASE, UNSPECIFIED WHETHER STAGE 3A OR 3B CKD (MULTI): ICD-10-CM

## 2023-12-14 PROBLEM — J20.8 ACUTE BRONCHITIS DUE TO OTHER SPECIFIED ORGANISMS: Status: ACTIVE | Noted: 2023-12-14

## 2023-12-14 PROBLEM — J06.9 URI (UPPER RESPIRATORY INFECTION): Status: ACTIVE | Noted: 2023-12-14

## 2023-12-14 PROCEDURE — 99214 OFFICE O/P EST MOD 30 MIN: CPT | Performed by: INTERNAL MEDICINE

## 2023-12-14 PROCEDURE — 1160F RVW MEDS BY RX/DR IN RCRD: CPT | Performed by: INTERNAL MEDICINE

## 2023-12-14 PROCEDURE — 3074F SYST BP LT 130 MM HG: CPT | Performed by: INTERNAL MEDICINE

## 2023-12-14 PROCEDURE — 3078F DIAST BP <80 MM HG: CPT | Performed by: INTERNAL MEDICINE

## 2023-12-14 PROCEDURE — 1036F TOBACCO NON-USER: CPT | Performed by: INTERNAL MEDICINE

## 2023-12-14 PROCEDURE — 1159F MED LIST DOCD IN RCRD: CPT | Performed by: INTERNAL MEDICINE

## 2023-12-14 RX ORDER — METHYLPREDNISOLONE 4 MG/1
TABLET ORAL
Qty: 21 TABLET | Refills: 0 | Status: SHIPPED | OUTPATIENT
Start: 2023-12-14 | End: 2023-12-21

## 2023-12-14 ASSESSMENT — ENCOUNTER SYMPTOMS
PSYCHIATRIC NEGATIVE: 1
HEMATOLOGIC/LYMPHATIC NEGATIVE: 1
ARTHRALGIAS: 0
NEUROLOGICAL NEGATIVE: 1
COUGH: 1
CARDIOVASCULAR NEGATIVE: 1
GASTROINTESTINAL NEGATIVE: 1

## 2023-12-14 NOTE — PROGRESS NOTES
Subjective   Patient ID: Nilo Benavidez is a 78 y.o. male who presents for Follow-up (Pt here for office visit with lab results).    HPI Resp infection  for 2 weeks.  No fever or shortness of breath patient has history of chronic kidney disease CHF and MitraClip for mitral regurg he will require another procedure since the MitraClip at this point is not working for him his last echo showed severe mitral regurg.  He remains asymptomatic.    Review of Systems   HENT: Negative.     Respiratory:  Positive for cough.    Cardiovascular: Negative.    Gastrointestinal: Negative.    Genitourinary: Negative.    Musculoskeletal:  Negative for arthralgias and gait problem.   Skin: Negative.    Neurological: Negative.    Hematological: Negative.    Psychiatric/Behavioral: Negative.     All other systems reviewed and are negative.      Objective   /70   Pulse 64   Temp 36.6 °C (97.8 °F) (Temporal)   Wt 120 kg (264 lb)   BMI 39.56 kg/m²     Physical Exam  Vitals reviewed.   Constitutional:       Appearance: Normal appearance.   HENT:      Head: Normocephalic and atraumatic.   Eyes:      Extraocular Movements: Extraocular movements intact.      Pupils: Pupils are equal, round, and reactive to light.   Cardiovascular:      Rate and Rhythm: Normal rate and regular rhythm.   Pulmonary:      Effort: Pulmonary effort is normal.      Breath sounds: Normal breath sounds.   Abdominal:      Palpations: Abdomen is soft.   Neurological:      General: No focal deficit present.      Mental Status: He is alert and oriented to person, place, and time. Mental status is at baseline.   Psychiatric:         Mood and Affect: Mood normal.         Behavior: Behavior normal.         Thought Content: Thought content normal.       Assessment/Plan   Problem List Items Addressed This Visit             ICD-10-CM    Anemia in chronic kidney disease N18.9, D63.1    Arthritis, multiple joint involvement M12.9    Stage 3 chronic kidney disease  (CMS/[facility]) N18.30    Acute bronchitis - Primary J20.9     Other Visit Diagnoses         Codes    Severe mitral regurgitation     I34.0          Patient blood sugars are good A1c is 6.1 hemoglobin stable at 13.2 he does have a history of iron deficiency anemia.  Patient was given Medrol Dosepak for his acute bronchitis he will continue albuterol as needed.  Patient will continue diuretics for CHF and back continue with statins with history of CAD.  Patient will continue glimepiride for diabetes.

## 2023-12-21 DIAGNOSIS — I10 BENIGN ESSENTIAL HYPERTENSION: ICD-10-CM

## 2023-12-22 RX ORDER — TORSEMIDE 100 MG/1
TABLET ORAL 2 TIMES DAILY
Qty: 90 TABLET | Refills: 3 | Status: SHIPPED | OUTPATIENT
Start: 2023-12-22

## 2024-01-02 ENCOUNTER — LAB (OUTPATIENT)
Dept: LAB | Facility: LAB | Age: 79
End: 2024-01-02
Payer: COMMERCIAL

## 2024-01-02 DIAGNOSIS — D64.9 SEVERE ANEMIA: ICD-10-CM

## 2024-01-02 DIAGNOSIS — D50.9 IRON DEFICIENCY ANEMIA, UNSPECIFIED IRON DEFICIENCY ANEMIA TYPE: ICD-10-CM

## 2024-01-02 LAB
BASOPHILS # BLD AUTO: 0.02 X10*3/UL (ref 0–0.1)
BASOPHILS NFR BLD AUTO: 0.3 %
EOSINOPHIL # BLD AUTO: 0.1 X10*3/UL (ref 0–0.4)
EOSINOPHIL NFR BLD AUTO: 1.7 %
ERYTHROCYTE [DISTWIDTH] IN BLOOD BY AUTOMATED COUNT: 14.9 % (ref 11.5–14.5)
HCT VFR BLD AUTO: 42.8 % (ref 41–52)
HGB BLD-MCNC: 14 G/DL (ref 13.5–17.5)
IMM GRANULOCYTES # BLD AUTO: 0.03 X10*3/UL (ref 0–0.5)
IMM GRANULOCYTES NFR BLD AUTO: 0.5 % (ref 0–0.9)
LYMPHOCYTES # BLD AUTO: 1.07 X10*3/UL (ref 0.8–3)
LYMPHOCYTES NFR BLD AUTO: 18 %
MCH RBC QN AUTO: 32.3 PG (ref 26–34)
MCHC RBC AUTO-ENTMCNC: 32.7 G/DL (ref 32–36)
MCV RBC AUTO: 99 FL (ref 80–100)
MONOCYTES # BLD AUTO: 1.7 X10*3/UL (ref 0.05–0.8)
MONOCYTES NFR BLD AUTO: 28.7 %
NEUTROPHILS # BLD AUTO: 3.01 X10*3/UL (ref 1.6–5.5)
NEUTROPHILS NFR BLD AUTO: 50.8 %
NRBC BLD-RTO: 0 /100 WBCS (ref 0–0)
PLATELET # BLD AUTO: 242 X10*3/UL (ref 150–450)
RBC # BLD AUTO: 4.33 X10*6/UL (ref 4.5–5.9)
WBC # BLD AUTO: 5.9 X10*3/UL (ref 4.4–11.3)

## 2024-01-02 PROCEDURE — 85025 COMPLETE CBC W/AUTO DIFF WBC: CPT

## 2024-01-02 PROCEDURE — 36415 COLL VENOUS BLD VENIPUNCTURE: CPT

## 2024-01-10 ENCOUNTER — CLINICAL SUPPORT (OUTPATIENT)
Dept: ORTHOPEDIC SURGERY | Facility: CLINIC | Age: 79
End: 2024-01-10
Payer: COMMERCIAL

## 2024-01-10 ENCOUNTER — OFFICE VISIT (OUTPATIENT)
Dept: ORTHOPEDIC SURGERY | Facility: CLINIC | Age: 79
End: 2024-01-10
Payer: COMMERCIAL

## 2024-01-10 DIAGNOSIS — M25.551 PAIN OF RIGHT HIP: ICD-10-CM

## 2024-01-10 DIAGNOSIS — M70.61 TROCHANTERIC BURSITIS OF RIGHT HIP: ICD-10-CM

## 2024-01-10 PROCEDURE — 73502 X-RAY EXAM HIP UNI 2-3 VIEWS: CPT | Mod: RIGHT SIDE | Performed by: ORTHOPAEDIC SURGERY

## 2024-01-10 PROCEDURE — 99203 OFFICE O/P NEW LOW 30 MIN: CPT | Performed by: ORTHOPAEDIC SURGERY

## 2024-01-10 PROCEDURE — 20610 DRAIN/INJ JOINT/BURSA W/O US: CPT | Performed by: ORTHOPAEDIC SURGERY

## 2024-01-10 PROCEDURE — 1160F RVW MEDS BY RX/DR IN RCRD: CPT | Performed by: ORTHOPAEDIC SURGERY

## 2024-01-10 PROCEDURE — 1036F TOBACCO NON-USER: CPT | Performed by: ORTHOPAEDIC SURGERY

## 2024-01-10 RX ORDER — BETAMETHASONE SODIUM PHOSPHATE AND BETAMETHASONE ACETATE 3; 3 MG/ML; MG/ML
12 INJECTION, SUSPENSION INTRA-ARTICULAR; INTRALESIONAL; INTRAMUSCULAR; SOFT TISSUE ONCE
Status: COMPLETED | OUTPATIENT
Start: 2024-01-10 | End: 2024-01-10

## 2024-01-10 RX ORDER — LIDOCAINE HYDROCHLORIDE 10 MG/ML
5 INJECTION INFILTRATION; PERINEURAL ONCE
Status: COMPLETED | OUTPATIENT
Start: 2024-01-10 | End: 2024-01-10

## 2024-01-10 RX ADMIN — LIDOCAINE HYDROCHLORIDE 50 MG: 10 INJECTION INFILTRATION; PERINEURAL at 15:30

## 2024-01-10 RX ADMIN — BETAMETHASONE SODIUM PHOSPHATE AND BETAMETHASONE ACETATE 12 MG: 3; 3 INJECTION, SUSPENSION INTRA-ARTICULAR; INTRALESIONAL; INTRAMUSCULAR; SOFT TISSUE at 15:30

## 2024-01-10 NOTE — PROGRESS NOTES
History of present: Right hip pain bursal pain been hurting for 6 months no real traumatic event    The patient is morbidly obese he is heavy he is short of breath laying flat he has cardiac disease he is not allowed to do NSAIDs he does not give a history of trauma or injury but continues to have pinpoint pain over the lateral IT band and over the hip bursa        Past medical history:    The patient's past medical history, family history, social history and review of systems were documented on the patient's medical intake form.  The medical intake form was reviewed and scanned into the electronic medical record for future use.  History is otherwise negative except as stated in the history of present illness.    Physical exam:    General: Alert and oriented to person place and time.  No acute distress and breathing comfortably, pleasant and cooperative with examination.    Head and neck exam: Head is normocephalic atraumatic.    Neck: Supple no visible swelling or deformity.    Cardiovascular: Good perfusion to affected extremities without signs or symptoms of chest pain.    Lungs no audible wheezing or labored breathing on examination.    Abdominal exam: Nondistended nontender    Extremities: The affected left hip was examined and inspected.  There was tenderness to touch along the groin and over the lateral aspect of the hip over the bursal area.  Hip joint moves freely.    There was no pain over the groin area to internal/external rotation abduction.    Palpable reproducible pain was reproduced with palpation over the lateral trochanteric process.  There was a tight IT band with a positive Monique sign.      The skin was intact without breakdown or open wound.  Old incisions of present were all healed.  There was mild crepitus seen with internal and external rotation and range of motion without evidence of gross instability.    Inspection of the low back showed normal curvature integrity of the skin.  The strength  and stability of the low back and ligaments were within normal limits.    There was a normal straight leg raise with no foot drop, numbness or tingling in the bilateral lower extremities.  Sensation, reflexes and pulses in the foot and ankle are preserved and present.  There was no obvious effusion.    Range of motion showed flexion to beyond 100 degrees degrees, abduction to 30 degrees, external rotation to 15 degrees and 18 degrees of internal rotation.  The patient had the ability to bear weight but with discomfort.  The patient's gait antalgic secondary to discomfort      Before aspiration injection the benefits of a cortisone injection including infection, local skin irritation, skin atrophy, calcification, continued pain and discomfort, elevated blood sugar, burning, failure to relieve pain, possible late infection were discussed with the patient.    Postprocedure discomfort can be alleviated with additional medications, ice, elevation, rest over the first 24 hours as recommended.    Patient verbalized understanding and wanted to proceed with the planned procedure.    After informed consent was provided and allergies verified, the patient was positioned appropriately on the  bed.  The right hip to be aspirated and injected was prepped and draped in a sterile fashion.  The skin was anesthetized with ethyl chloride spray.  A joint aspiration was to be performed an 18-gauge needle was used otherwise a 22-gauge needle was used to inject the appropriate joint.      Joint injection was performed with a mixture of 5 cc 1% lidocaine plain and 2 cc Celestone Soluspan 6 mg per mL.  The needle was removed and the puncture site closed and sealed with a Band-Aid.  The patient tolerated the procedure well.        Diagnostic studies: X-rays today show well-positioned hip joint there is a minimal small pincer spur but the joint spaces well-preserved there was a little bit of sclerotic change over the femoral head but the  joint space is not collapsed      Impression: Right hip trochanteric bursitis minimal arthritic change      Plan: Hip bursa injection PT therapy weight loss range of motion stretching program I will see him back in 5 to 6 weeks if he does not improve we can talk about an MRI that we anticipate nonsurgical treatment

## 2024-01-17 ENCOUNTER — EVALUATION (OUTPATIENT)
Dept: PHYSICAL THERAPY | Facility: CLINIC | Age: 79
End: 2024-01-17
Payer: COMMERCIAL

## 2024-01-17 DIAGNOSIS — M25.551 PAIN OF RIGHT HIP: Primary | ICD-10-CM

## 2024-01-17 DIAGNOSIS — G89.29 CHRONIC BILATERAL LOW BACK PAIN WITHOUT SCIATICA: ICD-10-CM

## 2024-01-17 DIAGNOSIS — M54.50 CHRONIC BILATERAL LOW BACK PAIN WITHOUT SCIATICA: ICD-10-CM

## 2024-01-17 PROCEDURE — 97110 THERAPEUTIC EXERCISES: CPT | Performed by: PHYSICAL THERAPIST

## 2024-01-17 ASSESSMENT — ENCOUNTER SYMPTOMS
OCCASIONAL FEELINGS OF UNSTEADINESS: 1
DEPRESSION: 0
LOSS OF SENSATION IN FEET: 0

## 2024-01-17 ASSESSMENT — PAIN SCALES - GENERAL: PAINLEVEL_OUTOF10: 0 - NO PAIN

## 2024-01-17 ASSESSMENT — PAIN - FUNCTIONAL ASSESSMENT: PAIN_FUNCTIONAL_ASSESSMENT: 0-10

## 2024-01-17 NOTE — PROGRESS NOTES
Physical Therapy    Physical Therapy Evaluation and Treatment      Patient Name: Nilo Benavidez  MRN: [id number]  Today's Date: 1/17/2024  Time Calculation  Start Time: 0140  Stop Time: 0225  Time Calculation (min): 45 min    Assessment:  This 80 yo pleasant male is present today with the diagnosis of Rt hip pain.  He reports his Rt hip is gone and feels much better since his injection back on 1/10/24.  He reports chronic lower back pain.  Difficulty standing or walking for prolonged periods of time.  Unable to vacuum which his son does at the current time.  He uses a scooter to go to events requiring prolonged standing and walking.  He's walking with a st cane and mild antalgia today.   He's morbidity obese and has multiple comorbities of DM, Rt knee OA which he's wearing a brace for, heart disease, COPD, RA, kidney disease.  He has a pacemaker/defibrillator and sleep apnea.  He reports lower back pain since Rt foot fracture and wearing a boot.  His lumbar AROM is WFL's and minimal pain with flexion and extension.  Rt hip AROM WFL's and pain free.  4/5 Rt hip flexion and 4-/5 abduction.  Tightness in his Rt hip ER's and IT band.  Positive Monique.  No tenderness to her Rt lateral hip region or lumbar paraspinals.         Plan:  Continue with skilled PT 1-2x/week for 4-6 treatments.        Current Problem:   1. Pain of right hip  Follow Up In Physical Therapy      2. Chronic bilateral low back pain without sciatica  Follow Up In Physical Therapy          Subjective  Pt reports his Rt hip is feeling much better since his injection and having no hip pain at the current time.  He reports chronic lower back pain for years.      Precautions:  Precautions  STEADI Fall Risk Score (The score of 4 or more indicates an increased risk of falling): 7    Pain:  Pain Assessment  Pain Assessment: 0-10  Pain Score: 0 - No pain      Objective   AROM:  Lumbar and Rt hip AROM WFL's and mild pain with lumbar flexion and abduction.       Tightness in (B) hamstrings,  passive SLR 50 degrees      Strength:  4/5 Rt hip flexion and 4-/5 Rt hip abduction      Posture:  Normal lumbar lordosis in standing and decreased in sitting      Palpation:  No tenderness to Rt lateral hip or lumbar paraspinals      Functional Mobility:  Labors with sit to and from supine.        Balance:  Good standing balance.  Fair walking      Special Tests: Positive Monique.  Negative SLR    Outcome Measures:  Other Measures  Lower Extremity Funtional Score (LEFS): 31/80     Treatments:    Piriformis stretch, 10 sec hold, 10 reps *  SLR's, flexion and abduction, 10 reps x 2 *  Bridging, 2 sec hold, 10 reps *  Sit with lumbar lordosis        Goals:    Maintain 0/10 Rt hip pain.  Decrease/abolish LBP  Pain free lumbar AROM  Increase flexibility in Rt hip ER/hamstrings and IT band  4-4+/5 Rt hip strength  Pt able to vacuum at home and do more work activities around the house.  Independent with HEP

## 2024-01-25 ENCOUNTER — TREATMENT (OUTPATIENT)
Dept: PHYSICAL THERAPY | Facility: CLINIC | Age: 79
End: 2024-01-25
Payer: COMMERCIAL

## 2024-01-25 DIAGNOSIS — M54.50 CHRONIC BILATERAL LOW BACK PAIN WITHOUT SCIATICA: ICD-10-CM

## 2024-01-25 DIAGNOSIS — G89.29 CHRONIC BILATERAL LOW BACK PAIN WITHOUT SCIATICA: ICD-10-CM

## 2024-01-25 DIAGNOSIS — M25.551 PAIN OF RIGHT HIP: Primary | ICD-10-CM

## 2024-01-25 PROCEDURE — 97110 THERAPEUTIC EXERCISES: CPT | Performed by: PHYSICAL THERAPIST

## 2024-01-25 ASSESSMENT — ENCOUNTER SYMPTOMS
LOSS OF SENSATION IN FEET: 0
DEPRESSION: 0
OCCASIONAL FEELINGS OF UNSTEADINESS: 1

## 2024-01-25 ASSESSMENT — PAIN SCALES - GENERAL: PAINLEVEL_OUTOF10: 0 - NO PAIN

## 2024-01-25 ASSESSMENT — PAIN - FUNCTIONAL ASSESSMENT: PAIN_FUNCTIONAL_ASSESSMENT: 0-10

## 2024-01-25 NOTE — PROGRESS NOTES
Physical Therapy    Physical Therapy Treatment    Patient Name: Nilo Benavidez  MRN: [id number]  Today's Date: 1/25/2024  Time Calculation  Start Time: 0225  Stop Time: 0300  Time Calculation (min): 35 min      Assessment:  Pt walking into the clinic today with st cane with mild antalgic gait pattern secondary to (B) knee pain.  He has a Rt knee brace on.  He tolerated prone and prone propping well but difficulty with transferring to that position noted.  Initiated amari bike which he tolerated 5 minutes noting increased Rt knee pain.  Pt given HEP.  No increased LBP after treatment.       Plan:  Continue with skilled PT 1-2x/week for 4-6 treatments.         Current Problem  1. Pain of right hip        2. Chronic bilateral low back pain without sciatica            Subjective  Pt reports his hip is good and having no pain.  He reports 8/10 lower back pain when standing or walking for prolonged periods of time.  No pain in sitting.      Precautions  Precautions  STEADI Fall Risk Score (The score of 4 or more indicates an increased risk of falling): 7    Pain  Pain Assessment  Pain Assessment: 0-10  Pain Score: 0 - No pain    Objective     AROM:  Lumbar and Rt hip AROM WFL's and mild pain with lumbar flexion and abduction.       Tightness in (B) hamstrings,  passive SLR 50 degrees        Strength:  4/5 Rt hip flexion and 4-/5 Rt hip abduction      Treatments:    Prone and prone propping, 3 minutes each *  Hook lying marching in place with ab bracing, 10 x 2 reps *  Supine SLR's, 10 reps x 2 *  Amari bike, seat 12, ROM  Piriformis stretch, 10 sec hold, 10 reps *  SLR's, flexion and abduction, 10 reps x 2 *  Bridging, 2 sec hold, 10 reps *  Sit with lumbar lordosis     Goals:    Maintain 0/10 Rt hip pain.  Decrease/abolish LBP  Pain free lumbar AROM  Increase flexibility in Rt hip ER/hamstrings and IT band  4-4+/5 Rt hip strength  Pt able to vacuum at home and do more work activities around the house.  Independent with HEP

## 2024-01-30 ENCOUNTER — TREATMENT (OUTPATIENT)
Dept: PHYSICAL THERAPY | Facility: CLINIC | Age: 79
End: 2024-01-30
Payer: COMMERCIAL

## 2024-01-30 ENCOUNTER — APPOINTMENT (OUTPATIENT)
Dept: PHYSICAL THERAPY | Facility: CLINIC | Age: 79
End: 2024-01-30
Payer: COMMERCIAL

## 2024-01-30 DIAGNOSIS — M25.551 PAIN OF RIGHT HIP: Primary | ICD-10-CM

## 2024-01-30 PROCEDURE — 97110 THERAPEUTIC EXERCISES: CPT | Performed by: PHYSICAL THERAPIST

## 2024-01-30 ASSESSMENT — ENCOUNTER SYMPTOMS
DEPRESSION: 0
OCCASIONAL FEELINGS OF UNSTEADINESS: 1
LOSS OF SENSATION IN FEET: 0

## 2024-01-30 ASSESSMENT — PAIN SCALES - GENERAL: PAINLEVEL_OUTOF10: 0 - NO PAIN

## 2024-01-30 ASSESSMENT — PAIN - FUNCTIONAL ASSESSMENT: PAIN_FUNCTIONAL_ASSESSMENT: 0-10

## 2024-01-30 NOTE — PROGRESS NOTES
Physical Therapy    Physical Therapy Treatment    Patient Name: Nilo Benavidez  MRN: [id number]  Today's Date: 1/30/2024  Time Calculation  Start Time: 0210  Stop Time: 0250  Time Calculation (min): 40 min      Assessment:  Pt walking into the clinic with st cane and mild antalgia secondary to knee pain.  Initiated hamstring stretches and hip strengthening in hook lying with green theraband.  Reviewed his other ex's requiring some visual and verbal cueing with.  Mild increased Rt knee pain during ex's today.  No lower back or Rt hip pain after treatment today.      Plan: Continue with skilled PT 1-2x/week for 4-6 treatments.          Current Problem  1. Pain of right hip            Subjective  Pt states his Rt hip is feeling good and no lower back pain at the current time.     3 out of 4-6 PT treatments    Precautions  Precautions  STEADI Fall Risk Score (The score of 4 or more indicates an increased risk of falling): 8    Pain  Pain Assessment  Pain Assessment: 0-10  Pain Score: 0 - No pain    Objective     AROM:  Lumbar and Rt hip AROM WFL's and pain free     Tightness in (B) hamstrings,  passive SLR 55 degrees     Strength:  4/5 Rt hip flexion and 4-/5 Rt hip abduction    Treatments:    Prone and prone propping, 3 minutes each *  Hook lying clam shells, GTB, 30 reps *  Hook lying marching in place with ab bracing, GTB, 25 reps *  Supine SLR's, 10 reps x 2 *  Amari bike, seat 12, ROM - Held secondary to increased Rt knee pain.  Piriformis stretch, 10 sec hold, 10 reps *  SLR's, flexion and abduction, 10 reps x 2 *  Bridging, 2 sec hold, 10 reps x 2 *  Hamstring stretches with strap, 30 sec hold, 3 reps *  Sit with lumbar lordosis       Goals:    Maintain 0/10 Rt hip pain.  Decrease/abolish LBP  Pain free lumbar AROM  Increase flexibility in Rt hip ER/hamstrings and IT band  4-4+/5 Rt hip strength  Pt able to vacuum at home and do more work activities around the house.  Independent with HEP

## 2024-02-01 ENCOUNTER — LAB (OUTPATIENT)
Dept: LAB | Facility: LAB | Age: 79
End: 2024-02-01
Payer: COMMERCIAL

## 2024-02-01 DIAGNOSIS — D64.9 SEVERE ANEMIA: ICD-10-CM

## 2024-02-01 DIAGNOSIS — D50.9 IRON DEFICIENCY ANEMIA, UNSPECIFIED IRON DEFICIENCY ANEMIA TYPE: ICD-10-CM

## 2024-02-01 LAB
BASOPHILS # BLD AUTO: 0.01 X10*3/UL (ref 0–0.1)
BASOPHILS NFR BLD AUTO: 0.2 %
EOSINOPHIL # BLD AUTO: 0.11 X10*3/UL (ref 0–0.4)
EOSINOPHIL NFR BLD AUTO: 2 %
ERYTHROCYTE [DISTWIDTH] IN BLOOD BY AUTOMATED COUNT: 15.2 % (ref 11.5–14.5)
HCT VFR BLD AUTO: 40.3 % (ref 41–52)
HGB BLD-MCNC: 13 G/DL (ref 13.5–17.5)
IMM GRANULOCYTES # BLD AUTO: 0.06 X10*3/UL (ref 0–0.5)
IMM GRANULOCYTES NFR BLD AUTO: 1.1 % (ref 0–0.9)
LYMPHOCYTES # BLD AUTO: 0.85 X10*3/UL (ref 0.8–3)
LYMPHOCYTES NFR BLD AUTO: 15.6 %
MCH RBC QN AUTO: 32.2 PG (ref 26–34)
MCHC RBC AUTO-ENTMCNC: 32.3 G/DL (ref 32–36)
MCV RBC AUTO: 100 FL (ref 80–100)
MONOCYTES # BLD AUTO: 1.31 X10*3/UL (ref 0.05–0.8)
MONOCYTES NFR BLD AUTO: 24 %
NEUTROPHILS # BLD AUTO: 3.12 X10*3/UL (ref 1.6–5.5)
NEUTROPHILS NFR BLD AUTO: 57.1 %
NRBC BLD-RTO: 0 /100 WBCS (ref 0–0)
PLATELET # BLD AUTO: 219 X10*3/UL (ref 150–450)
RBC # BLD AUTO: 4.04 X10*6/UL (ref 4.5–5.9)
WBC # BLD AUTO: 5.5 X10*3/UL (ref 4.4–11.3)

## 2024-02-01 PROCEDURE — 36415 COLL VENOUS BLD VENIPUNCTURE: CPT

## 2024-02-01 PROCEDURE — 85025 COMPLETE CBC W/AUTO DIFF WBC: CPT

## 2024-02-06 ENCOUNTER — APPOINTMENT (OUTPATIENT)
Dept: PHYSICAL THERAPY | Facility: CLINIC | Age: 79
End: 2024-02-06
Payer: MEDICARE

## 2024-02-13 ENCOUNTER — TREATMENT (OUTPATIENT)
Dept: PHYSICAL THERAPY | Facility: CLINIC | Age: 79
End: 2024-02-13
Payer: COMMERCIAL

## 2024-02-13 DIAGNOSIS — M25.551 PAIN OF RIGHT HIP: Primary | ICD-10-CM

## 2024-02-13 PROCEDURE — 97110 THERAPEUTIC EXERCISES: CPT | Performed by: PHYSICAL THERAPIST

## 2024-02-13 ASSESSMENT — PAIN - FUNCTIONAL ASSESSMENT: PAIN_FUNCTIONAL_ASSESSMENT: 0-10

## 2024-02-13 ASSESSMENT — PAIN SCALES - GENERAL: PAINLEVEL_OUTOF10: 0 - NO PAIN

## 2024-02-13 ASSESSMENT — ENCOUNTER SYMPTOMS
DEPRESSION: 0
OCCASIONAL FEELINGS OF UNSTEADINESS: 1
LOSS OF SENSATION IN FEET: 0

## 2024-02-13 NOTE — PROGRESS NOTES
Physical Therapy    Physical Therapy Treatment    Patient Name: Nilo Benavidez  MRN: [id number]  Today's Date: 2/13/2024  Time Calculation  Start Time: 0230  Stop Time: 0300  Time Calculation (min): 30 min      Assessment:  Pt walking into the clinic today with st cane and mild minimal antalgia with slow tera.  Added standing hip/LE strengthening ex's.   No increased pain just fatigue noted.   He states he hasn't been laying prone or doing propping secondary to getting SOB and has difficulty getting onto and off his stomach.   Pt given HEP.  No pain after treatment today.       Plan:  Continue with skilled PT 1-2x/week for 4-6 treatments.         Current Problem  1. Pain of right hip            Subjective  Pt reports his Rt hip was really bothering him yesterday but no pain today.      4 out of 4-6 PT treatments    Precautions  Precautions  STEADI Fall Risk Score (The score of 4 or more indicates an increased risk of falling): 8    Pain  Pain Assessment  Pain Assessment: 0-10  Pain Score: 0 - No pain    Objective     AROM:  Lumbar and Rt hip AROM WFL's and pain free     Tightness in (B) hamstrings,  passive SLR 55 degrees     Strength:  4/5 Rt hip flexion and 4-/5 Rt hip abduction    Treatments:    Prone and prone propping, 3 minutes each * - NP  Hook lying clam shells, GTB, 30 reps * - NP  Hook lying marching in place with ab bracing, GTB, 25 reps * - NP  Amari bike, seat 12, 6 minutes  Piriformis stretch, 10 sec hold, 10 reps * - NP  SLR's, flexion and abduction, 10 reps x 2 * - NP  Bridging, 2 sec hold, 10 reps x 2 * - NP  Hamstring stretches with strap, 30 sec hold, 3 reps * - NP  Sit with lumbar lordosis  Standing HR/TR's, 20 reps *  Standing slow marching in place, 20 reps *  Standing HSC, 20 reps *  Partial squats, 20 reps *  Standing hip 3-way, 20 reps *         Goals:    Maintain 0/10 Rt hip pain.  Decrease/abolish LBP  Pain free lumbar AROM  Increase flexibility in Rt hip ER/hamstrings and IT band  4-4+/5  Rt hip strength  Pt able to vacuum at home and do more work activities around the house.  Independent with HEP

## 2024-02-16 DIAGNOSIS — I10 ESSENTIAL HYPERTENSION, BENIGN: ICD-10-CM

## 2024-02-16 DIAGNOSIS — K21.9 GASTROESOPHAGEAL REFLUX DISEASE, UNSPECIFIED WHETHER ESOPHAGITIS PRESENT: ICD-10-CM

## 2024-02-16 DIAGNOSIS — M10.9 GOUT, UNSPECIFIED CAUSE, UNSPECIFIED CHRONICITY, UNSPECIFIED SITE: ICD-10-CM

## 2024-02-16 DIAGNOSIS — E78.2 MIXED HYPERLIPIDEMIA: ICD-10-CM

## 2024-02-19 RX ORDER — SPIRONOLACTONE 25 MG/1
50 TABLET ORAL DAILY
Qty: 180 TABLET | Refills: 3 | Status: SHIPPED | OUTPATIENT
Start: 2024-02-19

## 2024-02-19 RX ORDER — SIMVASTATIN 20 MG/1
20 TABLET, FILM COATED ORAL NIGHTLY
Qty: 90 TABLET | Refills: 3 | Status: SHIPPED | OUTPATIENT
Start: 2024-02-19

## 2024-02-19 RX ORDER — CARVEDILOL 25 MG/1
25 TABLET ORAL 2 TIMES DAILY
Qty: 180 TABLET | Refills: 3 | Status: SHIPPED | OUTPATIENT
Start: 2024-02-19

## 2024-02-19 RX ORDER — ALLOPURINOL 100 MG/1
200 TABLET ORAL DAILY
Qty: 180 TABLET | Refills: 3 | Status: SHIPPED | OUTPATIENT
Start: 2024-02-19

## 2024-02-19 RX ORDER — PANTOPRAZOLE SODIUM 40 MG/1
TABLET, DELAYED RELEASE ORAL
Qty: 90 TABLET | Refills: 3 | Status: SHIPPED | OUTPATIENT
Start: 2024-02-19

## 2024-02-20 ENCOUNTER — TREATMENT (OUTPATIENT)
Dept: PHYSICAL THERAPY | Facility: CLINIC | Age: 79
End: 2024-02-20
Payer: COMMERCIAL

## 2024-02-20 DIAGNOSIS — M25.551 PAIN OF RIGHT HIP: Primary | ICD-10-CM

## 2024-02-20 PROCEDURE — 97110 THERAPEUTIC EXERCISES: CPT | Performed by: PHYSICAL THERAPIST

## 2024-02-20 ASSESSMENT — PAIN - FUNCTIONAL ASSESSMENT: PAIN_FUNCTIONAL_ASSESSMENT: 0-10

## 2024-02-20 ASSESSMENT — ENCOUNTER SYMPTOMS
OCCASIONAL FEELINGS OF UNSTEADINESS: 1
LOSS OF SENSATION IN FEET: 0
DEPRESSION: 0

## 2024-02-20 ASSESSMENT — PAIN SCALES - GENERAL: PAINLEVEL_OUTOF10: 0 - NO PAIN

## 2024-02-20 NOTE — PROGRESS NOTES
Physical Therapy    Physical Therapy Treatment    Patient Name: Nilo Benavidez  MRN: [id number]  Today's Date: 2/20/2024  Time Calculation  Start Time: 0230  Stop Time: 0308  Time Calculation (min): 38 min      Assessment:  Pt walking into the clinic with st cane and slow tera.  Added resistance with standing hip 3-way with RTB.  Pt given HEP and RTB for home use.  His endurance is slowly improving.  His strength with MMT is the same at his hips.  No pain after PT today.      Plan:  Continue with skilled PT 1-2x/week for 4-6 treatments.          Current Problem  1. Pain of right hip            Subjective  Pt states he doesn't have any pain but feels tight all over and more tired than usual today.      5 out of 4-6 PT treatments    Precautions  Precautions  STEADI Fall Risk Score (The score of 4 or more indicates an increased risk of falling): 8  Pain  Pain Assessment  Pain Assessment: 0-10  Pain Score: 0 - No pain    Objective   AROM:  Lumbar and Rt hip AROM WFL's and pain free     Tightness in (B) hamstrings,  passive SLR 60 degrees     Strength:  4/5 Rt hip flexion and 4-/5 Rt hip abduction    Treatments:    Prone and prone propping, 3 minutes each * - NP  Hook lying clam shells, GTB, 30 reps * - NP  Hook lying marching in place with ab bracing, GTB, 25 reps * - NP  Amari bike, seat 12, 8 minutes  Piriformis stretch, 10 sec hold, 10 reps * - NP  SLR's, flexion and abduction, 10 reps x 2 * - NP  Bridging, 2 sec hold, 10 reps x 2 * - NP  Hamstring stretches with strap, 30 sec hold, 3 reps * - NP  Sit with lumbar lordosis  Standing HR/TR's, 30 reps *  Standing slow marching in place, 30 reps *  Standing HSC, 30 reps *  Partial squats, 10 reps x 3 *  Standing hip 3-way, RTB, 20 reps *       Goals:    Maintain 0/10 Rt hip pain.  Decrease/abolish LBP  Pain free lumbar AROM  Increase flexibility in Rt hip ER/hamstrings and IT band  4-4+/5 Rt hip strength  Pt able to vacuum at home and do more work activities around the  celestina.  Independent with HEP

## 2024-02-27 ENCOUNTER — APPOINTMENT (OUTPATIENT)
Dept: PHYSICAL THERAPY | Facility: CLINIC | Age: 79
End: 2024-02-27
Payer: MEDICARE

## 2024-02-28 ENCOUNTER — TREATMENT (OUTPATIENT)
Dept: PHYSICAL THERAPY | Facility: CLINIC | Age: 79
End: 2024-02-28
Payer: COMMERCIAL

## 2024-02-28 DIAGNOSIS — M25.551 PAIN OF RIGHT HIP: Primary | ICD-10-CM

## 2024-02-28 PROCEDURE — 97110 THERAPEUTIC EXERCISES: CPT | Performed by: PHYSICAL THERAPIST

## 2024-02-28 ASSESSMENT — PAIN - FUNCTIONAL ASSESSMENT: PAIN_FUNCTIONAL_ASSESSMENT: 0-10

## 2024-02-28 ASSESSMENT — PAIN SCALES - GENERAL: PAINLEVEL_OUTOF10: 0 - NO PAIN

## 2024-02-28 ASSESSMENT — ENCOUNTER SYMPTOMS
OCCASIONAL FEELINGS OF UNSTEADINESS: 1
LOSS OF SENSATION IN FEET: 0
DEPRESSION: 0

## 2024-02-28 NOTE — PROGRESS NOTES
Physical Therapy    Physical Therapy Treatment    Patient Name: Nilo Benavidez  MRN: [id number]  Today's Date: 2/28/2024  Time Calculation  Start Time: 0230  Stop Time: 0310  Time Calculation (min): 40 min      Assessment:  This 80 yo pleasant male has been seen for 6 PT treatments.  Overall, his Rt hip is feeling better and his lower back and Rt knee still have some pain.  His Rt hip AROM WFL's and pain free.  Some weakness remains with hip abduction.  He walks with a st cane to normalize his gait pattern noting some Trendelenberg and antalgia secondary to weakness and Rt knee pain.  He's having no real difficulty with his instrumental and work activities around the house.  His son does more of the strenuous work around the house.  He's independent with his HEP.    Plan:  Discharge      Current Problem  1. Pain of right hip            Subjective  Pt reports no Rt hip pain.  Reports more Rt knee and lower back pain today.      6 out of 6 PT treatments    Precautions  Precautions  STEADI Fall Risk Score (The score of 4 or more indicates an increased risk of falling): 8    Pain  Pain Assessment  Pain Assessment: 0-10  Pain Score: 0 - No pain    Objective     AROM:  Lumbar and Rt hip AROM WFL's and pain free     Tightness in (B) hamstrings,  passive SLR 60 degrees     Strength:  4/5 Rt hip flexion and 4-/5 Rt hip abduction    Outcome Measures:  Other Measures  Lower Extremity Funtional Score (LEFS): 35/80    Treatments:    Prone and prone propping, 3 minutes each * - NP  Hook lying clam shells, GTB, 30 reps * - NP  Hook lying marching in place with ab bracing, GTB, 25 reps * - NP  Amari bike, seat 12, 6 minutes  Piriformis stretch, 10 sec hold, 10 reps * - NP  SLR's, flexion and abduction, 10 reps x 2 * - NP  Bridging, 2 sec hold, 10 reps x 2 * - NP  Hamstring stretches with strap, 30 sec hold, 3 reps * - NP  Sit with lumbar lordosis  Standing HR/TR's, 30 reps *  Standing slow marching in place, 30 reps *  Standing HSC,  30 reps *  Partial squats, 10 reps x 3 *  Standing hip 3-way, RTB, 20 reps *      Goals:    Maintain 0/10 Rt hip pain.   Met  Decrease/abolish LBP    Partially met  3.  Pain free lumbar AROM   Partially met  4.  Increase flexibility in Rt hip ER/hamstrings and IT band   Partially met  5.  4-4+/5 Rt hip strength   Partially met  6.  Pt able to vacuum at home and do more work activities around the house.   Met  7.  Independent with HEP  Met

## 2024-03-01 ENCOUNTER — LAB (OUTPATIENT)
Dept: LAB | Facility: LAB | Age: 79
End: 2024-03-01
Payer: COMMERCIAL

## 2024-03-01 DIAGNOSIS — D64.9 SEVERE ANEMIA: ICD-10-CM

## 2024-03-01 DIAGNOSIS — D50.9 IRON DEFICIENCY ANEMIA, UNSPECIFIED IRON DEFICIENCY ANEMIA TYPE: ICD-10-CM

## 2024-03-01 LAB
BASOPHILS # BLD AUTO: 0.02 X10*3/UL (ref 0–0.1)
BASOPHILS NFR BLD AUTO: 0.4 %
EOSINOPHIL # BLD AUTO: 0.1 X10*3/UL (ref 0–0.4)
EOSINOPHIL NFR BLD AUTO: 2.2 %
ERYTHROCYTE [DISTWIDTH] IN BLOOD BY AUTOMATED COUNT: 15.2 % (ref 11.5–14.5)
HCT VFR BLD AUTO: 40 % (ref 41–52)
HGB BLD-MCNC: 12.8 G/DL (ref 13.5–17.5)
IMM GRANULOCYTES # BLD AUTO: 0.05 X10*3/UL (ref 0–0.5)
IMM GRANULOCYTES NFR BLD AUTO: 1.1 % (ref 0–0.9)
LYMPHOCYTES # BLD AUTO: 1.04 X10*3/UL (ref 0.8–3)
LYMPHOCYTES NFR BLD AUTO: 22.6 %
MCH RBC QN AUTO: 32.3 PG (ref 26–34)
MCHC RBC AUTO-ENTMCNC: 32 G/DL (ref 32–36)
MCV RBC AUTO: 101 FL (ref 80–100)
MONOCYTES # BLD AUTO: 1.09 X10*3/UL (ref 0.05–0.8)
MONOCYTES NFR BLD AUTO: 23.6 %
NEUTROPHILS # BLD AUTO: 2.31 X10*3/UL (ref 1.6–5.5)
NEUTROPHILS NFR BLD AUTO: 50.1 %
NRBC BLD-RTO: 0 /100 WBCS (ref 0–0)
PLATELET # BLD AUTO: 210 X10*3/UL (ref 150–450)
RBC # BLD AUTO: 3.96 X10*6/UL (ref 4.5–5.9)
WBC # BLD AUTO: 4.6 X10*3/UL (ref 4.4–11.3)

## 2024-03-01 PROCEDURE — 85025 COMPLETE CBC W/AUTO DIFF WBC: CPT

## 2024-03-01 PROCEDURE — 36415 COLL VENOUS BLD VENIPUNCTURE: CPT

## 2024-03-05 ENCOUNTER — APPOINTMENT (OUTPATIENT)
Dept: PHYSICAL THERAPY | Facility: CLINIC | Age: 79
End: 2024-03-05
Payer: MEDICARE

## 2024-03-07 ENCOUNTER — HOSPITAL ENCOUNTER (OUTPATIENT)
Dept: CARDIOLOGY | Facility: HOSPITAL | Age: 79
Discharge: HOME | End: 2024-03-07
Payer: COMMERCIAL

## 2024-03-07 DIAGNOSIS — Z95.810 ICD (IMPLANTABLE CARDIOVERTER-DEFIBRILLATOR), BIVENTRICULAR, IN SITU: ICD-10-CM

## 2024-03-07 PROCEDURE — 93295 DEV INTERROG REMOTE 1/2/MLT: CPT | Performed by: INTERNAL MEDICINE

## 2024-03-07 PROCEDURE — 93296 REM INTERROG EVL PM/IDS: CPT

## 2024-03-19 ENCOUNTER — OFFICE VISIT (OUTPATIENT)
Dept: PRIMARY CARE | Facility: CLINIC | Age: 79
End: 2024-03-19
Payer: COMMERCIAL

## 2024-03-19 VITALS
SYSTOLIC BLOOD PRESSURE: 118 MMHG | HEART RATE: 64 BPM | DIASTOLIC BLOOD PRESSURE: 72 MMHG | WEIGHT: 269 LBS | BODY MASS INDEX: 39.84 KG/M2 | TEMPERATURE: 97.9 F | HEIGHT: 69 IN

## 2024-03-19 DIAGNOSIS — N18.4 STAGE 4 CHRONIC KIDNEY DISEASE (MULTI): ICD-10-CM

## 2024-03-19 DIAGNOSIS — Z00.00 ROUTINE GENERAL MEDICAL EXAMINATION AT HEALTH CARE FACILITY: ICD-10-CM

## 2024-03-19 DIAGNOSIS — I77.89 ACQUIRED ARTERIOVENOUS MALFORMATION (CMS-HCC): ICD-10-CM

## 2024-03-19 DIAGNOSIS — I50.22 CHRONIC SYSTOLIC CONGESTIVE HEART FAILURE (MULTI): ICD-10-CM

## 2024-03-19 DIAGNOSIS — M46.1 SACROILIITIS, NOT ELSEWHERE CLASSIFIED (CMS-HCC): ICD-10-CM

## 2024-03-19 DIAGNOSIS — I49.8 CHRONOTROPIC INCOMPETENCE WITH SINUS NODE DYSFUNCTION: ICD-10-CM

## 2024-03-19 DIAGNOSIS — E66.01 MORBID (SEVERE) OBESITY DUE TO EXCESS CALORIES (MULTI): ICD-10-CM

## 2024-03-19 DIAGNOSIS — D50.9 IRON DEFICIENCY ANEMIA, UNSPECIFIED IRON DEFICIENCY ANEMIA TYPE: ICD-10-CM

## 2024-03-19 DIAGNOSIS — J42 CHRONIC BRONCHITIS, UNSPECIFIED CHRONIC BRONCHITIS TYPE (MULTI): ICD-10-CM

## 2024-03-19 DIAGNOSIS — E11.21 DIABETES MELLITUS WITH KIDNEY DISEASE (MULTI): ICD-10-CM

## 2024-03-19 DIAGNOSIS — Z00.00 MEDICARE ANNUAL WELLNESS VISIT, SUBSEQUENT: Primary | ICD-10-CM

## 2024-03-19 PROCEDURE — 3078F DIAST BP <80 MM HG: CPT | Performed by: INTERNAL MEDICINE

## 2024-03-19 PROCEDURE — 3074F SYST BP LT 130 MM HG: CPT | Performed by: INTERNAL MEDICINE

## 2024-03-19 PROCEDURE — 99397 PER PM REEVAL EST PAT 65+ YR: CPT | Performed by: INTERNAL MEDICINE

## 2024-03-19 PROCEDURE — 1160F RVW MEDS BY RX/DR IN RCRD: CPT | Performed by: INTERNAL MEDICINE

## 2024-03-19 PROCEDURE — 1159F MED LIST DOCD IN RCRD: CPT | Performed by: INTERNAL MEDICINE

## 2024-03-19 PROCEDURE — 1157F ADVNC CARE PLAN IN RCRD: CPT | Performed by: INTERNAL MEDICINE

## 2024-03-19 PROCEDURE — G0439 PPPS, SUBSEQ VISIT: HCPCS | Performed by: INTERNAL MEDICINE

## 2024-03-19 PROCEDURE — 1036F TOBACCO NON-USER: CPT | Performed by: INTERNAL MEDICINE

## 2024-03-19 ASSESSMENT — ENCOUNTER SYMPTOMS
CONSTITUTIONAL NEGATIVE: 1
GASTROINTESTINAL NEGATIVE: 1
MUSCULOSKELETAL NEGATIVE: 1
RESPIRATORY NEGATIVE: 1
NEUROLOGICAL NEGATIVE: 1
CARDIOVASCULAR NEGATIVE: 1

## 2024-03-19 NOTE — PROGRESS NOTES
"Subjective   Reason for Visit: Nilo Benavidez is an 79 y.o. male here for a Medicare Wellness visit.          Reviewed all medications by prescribing practitioner or clinical pharmacist (such as prescriptions, OTCs, herbal therapies and supplements) and documented in the medical record.    HPI patient is here for wellness exam he had echo done which showed that his mitral regurgitation is getting worse he had MitraClip done couple years ago he does have history of iron deficiency anemia and type 2 diabetes he is on glimepiride for diabetes he has chronic kidney disease and CHF he takes torsemide and takes metolazone when he gains weight.  He is not having any PND or orthopnea.    Patient Care Team:  Jade Hunt MD as PCP - General  Jade Hunt MD as PCP - [facility]ributed [facility]  Katy Garcia MD as Consulting Physician (Hematology and Oncology)     Review of Systems   Constitutional: Negative.    HENT: Negative.     Respiratory: Negative.     Cardiovascular: Negative.    Gastrointestinal: Negative.    Genitourinary: Negative.    Musculoskeletal: Negative.    Neurological: Negative.    All other systems reviewed and are negative.      Objective   Vitals:  /72   Pulse 64   Temp 36.6 °C (97.9 °F) (Temporal)   Ht 1.74 m (5' 8.5\")   Wt 122 kg (269 lb)   BMI 40.31 kg/m²       Physical Exam  HENT:      Nose: Nose normal.   Eyes:      Conjunctiva/sclera: Conjunctivae normal.   Cardiovascular:      Rate and Rhythm: Normal rate and regular rhythm.      Heart sounds: Murmur heard.   Pulmonary:      Effort: Pulmonary effort is normal.      Breath sounds: Normal breath sounds.   Abdominal:      Palpations: There is no mass.   Musculoskeletal:         General: No swelling.      Right lower leg: No edema.      Left lower leg: No edema.   Neurological:      General: No focal deficit present.      Mental Status: He is oriented to person, place, and time.   Psychiatric:         Mood and Affect: " Mood normal.         Behavior: Behavior normal.         Thought Content: Thought content normal.         Assessment/Plan   Problem List Items Addressed This Visit       Chronic systolic congestive heart failure (CMS/HCC)    Diabetes mellitus with kidney disease (CMS/[facility])    Iron deficiency anemia    Chronotropic incompetence with sinus node dysfunction (CMS/HCC)    Medicare annual wellness visit, subsequent - Primary    Stage 4 chronic kidney disease (CMS/HCC)    Sacroiliitis, not elsewhere classified (CMS/[facility])    Chronic bronchitis, unspecified chronic bronchitis type (CMS/[facility])    Acquired arteriovenous malformation (CMS/[facility])    Morbid (severe) obesity due to excess calories (CMS/[facility])     Other Visit Diagnoses       Routine general medical examination at health care facility            Patient has stage III/IV kidney disease he is diabetic he has CHF mitral regurg he was chronic iron deficiency anemia he does have sinus node dysfunction BMI is in the 40+ rangePatient should follow 1800 ADA diet and be active for 30 minutes a day 5 days a week.  Diet exercise weight loss recommended to reduce cardiometabolic risk.  Given losing 5 to 10 pounds weight can reduce risk of health related problems.  He does have a history of sacroiliitis and back pain issues which at this point are under control.

## 2024-04-01 ENCOUNTER — LAB (OUTPATIENT)
Dept: LAB | Facility: LAB | Age: 79
End: 2024-04-01
Payer: COMMERCIAL

## 2024-04-01 DIAGNOSIS — D64.9 SEVERE ANEMIA: ICD-10-CM

## 2024-04-01 DIAGNOSIS — D50.9 IRON DEFICIENCY ANEMIA, UNSPECIFIED IRON DEFICIENCY ANEMIA TYPE: ICD-10-CM

## 2024-04-01 LAB
BASOPHILS # BLD AUTO: 0.03 X10*3/UL (ref 0–0.1)
BASOPHILS NFR BLD AUTO: 0.6 %
EOSINOPHIL # BLD AUTO: 0.1 X10*3/UL (ref 0–0.4)
EOSINOPHIL NFR BLD AUTO: 2 %
ERYTHROCYTE [DISTWIDTH] IN BLOOD BY AUTOMATED COUNT: 14.9 % (ref 11.5–14.5)
HCT VFR BLD AUTO: 42.1 % (ref 41–52)
HGB BLD-MCNC: 13.6 G/DL (ref 13.5–17.5)
IMM GRANULOCYTES # BLD AUTO: 0.02 X10*3/UL (ref 0–0.5)
IMM GRANULOCYTES NFR BLD AUTO: 0.4 % (ref 0–0.9)
LYMPHOCYTES # BLD AUTO: 1.19 X10*3/UL (ref 0.8–3)
LYMPHOCYTES NFR BLD AUTO: 24.2 %
MCH RBC QN AUTO: 32.5 PG (ref 26–34)
MCHC RBC AUTO-ENTMCNC: 32.3 G/DL (ref 32–36)
MCV RBC AUTO: 101 FL (ref 80–100)
MONOCYTES # BLD AUTO: 1.17 X10*3/UL (ref 0.05–0.8)
MONOCYTES NFR BLD AUTO: 23.8 %
NEUTROPHILS # BLD AUTO: 2.41 X10*3/UL (ref 1.6–5.5)
NEUTROPHILS NFR BLD AUTO: 49 %
NRBC BLD-RTO: 0 /100 WBCS (ref 0–0)
PLATELET # BLD AUTO: 197 X10*3/UL (ref 150–450)
RBC # BLD AUTO: 4.19 X10*6/UL (ref 4.5–5.9)
WBC # BLD AUTO: 4.9 X10*3/UL (ref 4.4–11.3)

## 2024-04-01 PROCEDURE — 85025 COMPLETE CBC W/AUTO DIFF WBC: CPT

## 2024-04-01 PROCEDURE — 36415 COLL VENOUS BLD VENIPUNCTURE: CPT

## 2024-04-19 DIAGNOSIS — E11.69 TYPE 2 DIABETES MELLITUS WITH OTHER SPECIFIED COMPLICATION, WITHOUT LONG-TERM CURRENT USE OF INSULIN (MULTI): ICD-10-CM

## 2024-04-22 RX ORDER — GLIMEPIRIDE 4 MG/1
4 TABLET ORAL 2 TIMES DAILY
Qty: 180 TABLET | Refills: 1 | Status: SHIPPED | OUTPATIENT
Start: 2024-04-22

## 2024-05-01 ENCOUNTER — LAB (OUTPATIENT)
Dept: LAB | Facility: LAB | Age: 79
End: 2024-05-01
Payer: COMMERCIAL

## 2024-05-01 DIAGNOSIS — K86.2 PANCREATIC CYST (HHS-HCC): ICD-10-CM

## 2024-05-01 DIAGNOSIS — D64.9 SEVERE ANEMIA: ICD-10-CM

## 2024-05-01 DIAGNOSIS — D50.0 ANEMIA, BLOOD LOSS: Primary | ICD-10-CM

## 2024-05-01 LAB
BASOPHILS # BLD AUTO: 0.01 X10*3/UL (ref 0–0.1)
BASOPHILS NFR BLD AUTO: 0.2 %
CREAT SERPL-MCNC: 1.91 MG/DL (ref 0.5–1.3)
EGFRCR SERPLBLD CKD-EPI 2021: 35 ML/MIN/1.73M*2
EOSINOPHIL # BLD AUTO: 0.07 X10*3/UL (ref 0–0.4)
EOSINOPHIL NFR BLD AUTO: 1.3 %
ERYTHROCYTE [DISTWIDTH] IN BLOOD BY AUTOMATED COUNT: 14.7 % (ref 11.5–14.5)
HCT VFR BLD AUTO: 39.8 % (ref 41–52)
HGB BLD-MCNC: 13.3 G/DL (ref 13.5–17.5)
IMM GRANULOCYTES # BLD AUTO: 0.02 X10*3/UL (ref 0–0.5)
IMM GRANULOCYTES NFR BLD AUTO: 0.4 % (ref 0–0.9)
LYMPHOCYTES # BLD AUTO: 1.03 X10*3/UL (ref 0.8–3)
LYMPHOCYTES NFR BLD AUTO: 18.6 %
MCH RBC QN AUTO: 32 PG (ref 26–34)
MCHC RBC AUTO-ENTMCNC: 33.4 G/DL (ref 32–36)
MCV RBC AUTO: 96 FL (ref 80–100)
MONOCYTES # BLD AUTO: 1.03 X10*3/UL (ref 0.05–0.8)
MONOCYTES NFR BLD AUTO: 18.6 %
NEUTROPHILS # BLD AUTO: 3.37 X10*3/UL (ref 1.6–5.5)
NEUTROPHILS NFR BLD AUTO: 60.9 %
NRBC BLD-RTO: 0 /100 WBCS (ref 0–0)
PLATELET # BLD AUTO: 204 X10*3/UL (ref 150–450)
RBC # BLD AUTO: 4.15 X10*6/UL (ref 4.5–5.9)
WBC # BLD AUTO: 5.5 X10*3/UL (ref 4.4–11.3)

## 2024-05-01 PROCEDURE — 36415 COLL VENOUS BLD VENIPUNCTURE: CPT

## 2024-05-01 PROCEDURE — 85025 COMPLETE CBC W/AUTO DIFF WBC: CPT

## 2024-05-01 PROCEDURE — 82565 ASSAY OF CREATININE: CPT

## 2024-05-02 ENCOUNTER — HOSPITAL ENCOUNTER (OUTPATIENT)
Dept: RADIOLOGY | Facility: HOSPITAL | Age: 79
Discharge: HOME | End: 2024-05-02
Payer: COMMERCIAL

## 2024-05-02 DIAGNOSIS — K86.2 PANCREATIC CYST (HHS-HCC): ICD-10-CM

## 2024-05-03 ENCOUNTER — HOSPITAL ENCOUNTER (OUTPATIENT)
Dept: RADIOLOGY | Facility: HOSPITAL | Age: 79
Discharge: HOME | End: 2024-05-03
Payer: COMMERCIAL

## 2024-05-03 PROCEDURE — 74160 CT ABDOMEN W/CONTRAST: CPT | Performed by: RADIOLOGY

## 2024-05-03 PROCEDURE — 2550000001 HC RX 255 CONTRASTS: Performed by: INTERNAL MEDICINE

## 2024-05-03 PROCEDURE — 74177 CT ABD & PELVIS W/CONTRAST: CPT

## 2024-05-03 PROCEDURE — 2500000004 HC RX 250 GENERAL PHARMACY W/ HCPCS (ALT 636 FOR OP/ED): Performed by: INTERNAL MEDICINE

## 2024-05-03 RX ADMIN — SODIUM CHLORIDE 1000 ML: 9 INJECTION, SOLUTION INTRAVENOUS at 12:55

## 2024-05-03 RX ADMIN — IOHEXOL 90 ML: 350 INJECTION, SOLUTION INTRAVENOUS at 14:31

## 2024-05-06 NOTE — PROGRESS NOTES
Consent:  Verbal consent was requested and obtained from patient on this date for a telehealth visit.    Patient ID: Josue Benavidez is a 79 y.o. male.    Subjective    HPI  Mr. Nilo Benavidez is a 77 y/o M presenting for initial consultation at the request of Dr. Hunt, for anemia.      Most recent blood work on 2/22/2023, showed hemoglobin 11.2, which has been stable since October 2022. In September 2022, it did drop to 8.7. Iron studies back in December showed some iron deficiency with iron saturation 15%. HbA1c has been stable at  goal at 5.9%. He underwent an ultrasound of the spleen on 2/10/2023, which was mildly enlarged at 13.6 cm. No splenic lesions seen, however on repeat imaging on CT C/A/P on 2/28/2023, which was completed because he was having new abdominal pain, his spleen  appeared normal size with some mild cardiomegaly. No mediastinal, hilar or axillary lymphadenopathy. The liver was enlarged up to 22.6 cm with a few too small to completely characterize liver hypodensities which are unchanged form prior. Pancreas was  unremarkable. There is a stable 2.0 cm hypodensity which is unchanged since April 2018.      Patient reports that he continues to have left-sided abdominal pain that has slightly improved. He recalls that approximately 2 months ago he had a fall and then strained that area while trying to stand up according to his friend. He states that was when  the symptoms started. He denies any stool changes. Denies any unexplained weight loss. No recent fevers or chills. No other complaints. No couth, chest pain or SOB. Reports no new medications.      Interval Events  8/9/2023: Patient presents today for follow-up for normocytic anemia and splenomegaly. Most recent hemoglobin is 14.1 today with normal platelets and normal WBC.      Since last visit, patient reports intermittent abdominal pain, not always associated with exertion and not related to diet or trauma. Denies any recent infections,  fevers or chills. No dysphagia. No new chest pain, cough or SOB. No new night sweats. He  also reports intermittent lower extremity edema, however notes that he does tend to retain fluid. No other complaints today.      5/7/24: A telephone visit (audio only) between the patient at home and the provider at [facility] [place] was utilized to provide this telehealth service.     Patient presents for follow-up for anemia.     Patient's past medical history, surgical history, family history and social history reviewed.    Objective      There were no vitals taken for this visit.     Review of Systems:   Review of Systems:    Positive per HPI, otherwise negative.     Physical Exam  Gen: appears well in clinic, NAD  HEENT: atraumatic head, normocephalic, EOMI, conjunctiva normal  LUNG: no increased WOB, CTAB  CV: No JVD. RRR  GI: soft, NT, ND  LE: no LE edema  Skin: no obvious rashes or lesions on visible skin  Neuro: interactive, no focal deficits noted  Psych: normal mood and affect  Performance Status:  Symptomatic; fully ambulatory    Labs/Imaging/Pathology: personally reviewed reports and images in Epic electronic medical record system. Pertinent results as it related to the plan represented in below in assessment and plan.   Assessment/Plan    1. Splenomegaly  - Patient was known to have some splenomegaly measuring up to 13 cm on his spleen ultrasound on 2/10/23, however CT C/A/P on 2/28/23 the spleen measured about 10 cm on my review and measurement. It does not appear enlarged on imaging to me.   - Also has no new lymphadenopathy. No B symptoms.   - I do not think he needs further workup for the splenomegaly.       2. Normocytic anemia  - He has had persistent anemia for some time. Had a history of a GI bleed back in April and states he has not had issues since that time.  - He also has a history of diabetes and CKD, however it is well controlled.  - Discussed to complete workup for anemia we will  repeat iron studies, ferritin and folate and repeat CBC and CMP. We will also check SPEP and serum free light chains to rule out a plasma cell dyscrasia and a reticulocyte count to assess bone marrow function.   - We will follow this blood work and discuss if he needs to be seen. Otherwise, he can follow-up as needed if there are any new symptoms.   - Discussed that I do not have a clear explanation for his left-sided abdominal pain, however I do not think that it is related to his splenomegaly. He believes it could be musculoskeletal and he will continue to monitor. Discussed if it was to persist,  he may need repeat imaging.  - Also discussed he has a pancreatic lesion that appears to be stable, however if it enlarges he will need an MRCP.   - RTC with me as needed unless there are any abnormalities on blood work above.     8/9/23:  - Blood work today shows no cytopenias.   - We discussed at this point given that he is having worsening abdominal symptoms, we recommend a repeat abdominal ultrasound to look at his spleen and liver.   - We discussed if no clear pathology and no worsening splenomegaly we will likely just monitor, however given the pain he may need further imaging with a CT scan.  - We will arrange for ultrasound and follow-up over the phone.   - RTC for phone visit to discuss ultrasound results.     5/7/24: Telephone visit  -     RTC . This note has been transcribed using a medical scribe and there is a possibility of unintentional typing misprints.       {[email or web address]}  Katy Garcia MD  Hematology/Oncology  [facility]    Scribe Attestation  By signing my name below, I, Milagro Hermes Guzman attest that this documentation has been prepared under the direction and in the presence of Katy Garcia MD.

## 2024-05-07 ENCOUNTER — APPOINTMENT (OUTPATIENT)
Dept: HEMATOLOGY/ONCOLOGY | Facility: CLINIC | Age: 79
End: 2024-05-07
Payer: COMMERCIAL

## 2024-05-08 NOTE — PROGRESS NOTES
Consent:  Verbal consent was requested and obtained from patient on this date for a telehealth visit.    Patient ID: Josue Benavidez is a 79 y.o. male.    Subjective    HPI  Mr. Nilo Benavidez is a 79 y/o M presenting for initial consultation at the request of Dr. Hunt, for anemia.      Most recent blood work on 2/22/2023, showed hemoglobin 11.2, which has been stable since October 2022. In September 2022, it did drop to 8.7. Iron studies back in December showed some iron deficiency with iron saturation 15%. HbA1c has been stable at  goal at 5.9%. He underwent an ultrasound of the spleen on 2/10/2023, which was mildly enlarged at 13.6 cm. No splenic lesions seen, however on repeat imaging on CT C/A/P on 2/28/2023, which was completed because he was having new abdominal pain, his spleen  appeared normal size with some mild cardiomegaly. No mediastinal, hilar or axillary lymphadenopathy. The liver was enlarged up to 22.6 cm with a few too small to completely characterize liver hypodensities which are unchanged form prior. Pancreas was  unremarkable. There is a stable 2.0 cm hypodensity which is unchanged since April 2018.      Patient reports that he continues to have left-sided abdominal pain that has slightly improved. He recalls that approximately 2 months ago he had a fall and then strained that area while trying to stand up according to his friend. He states that was when  the symptoms started. He denies any stool changes. Denies any unexplained weight loss. No recent fevers or chills. No other complaints. No couth, chest pain or SOB. Reports no new medications.      Interval Events  8/9/2023: Patient presents today for follow-up for normocytic anemia and splenomegaly. Most recent hemoglobin is 14.1 today with normal platelets and normal WBC.      Since last visit, patient reports intermittent abdominal pain, not always associated with exertion and not related to diet or trauma. Denies any recent infections,  fevers or chills. No dysphagia. No new chest pain, cough or SOB. No new night sweats. He  also reports intermittent lower extremity edema, however notes that he does tend to retain fluid. No other complaints today.     5/10/24: A telephone visit (audio only) between the patient at home and the provider at [facility] [place] was utilized to provide this telehealth service.     Patient presents for follow-up for anemia.     Patient's past medical history, surgical history, family history and social history reviewed.      Objective      There were no vitals taken for this visit.     Review of Systems:   Review of Systems:    Positive per HPI, otherwise negative.     Physical Exam  Gen: appears well in clinic, NAD  HEENT: atraumatic head, normocephalic, EOMI, conjunctiva normal  LUNG: no increased WOB, CTAB  CV: No JVD. RRR  GI: soft, NT, ND  LE: no LE edema  Skin: no obvious rashes or lesions on visible skin  Neuro: interactive, no focal deficits noted  Psych: normal mood and affect    Performance Status:  Symptomatic; fully ambulatory    Labs/Imaging/Pathology: personally reviewed reports and images in Epic electronic medical record system. Pertinent results as it related to the plan represented in below in assessment and plan.   Assessment/Plan    1. Splenomegaly  - Patient was known to have some splenomegaly measuring up to 13 cm on his spleen ultrasound on 2/10/23, however CT C/A/P on 2/28/23 the spleen measured about 10 cm on my review and measurement. It does not appear enlarged on imaging to me.   - Also has no new lymphadenopathy. No B symptoms.   - I do not think he needs further workup for the splenomegaly.       2. Normocytic anemia  - He has had persistent anemia for some time. Had a history of a GI bleed back in April and states he has not had issues since that time.  - He also has a history of diabetes and CKD, however it is well controlled.  - Discussed to complete workup for anemia we  will repeat iron studies, ferritin and folate and repeat CBC and CMP. We will also check SPEP and serum free light chains to rule out a plasma cell dyscrasia and a reticulocyte count to assess bone marrow function.   - We will follow this blood work and discuss if he needs to be seen. Otherwise, he can follow-up as needed if there are any new symptoms.   - Discussed that I do not have a clear explanation for his left-sided abdominal pain, however I do not think that it is related to his splenomegaly. He believes it could be musculoskeletal and he will continue to monitor. Discussed if it was to persist,  he may need repeat imaging.  - Also discussed he has a pancreatic lesion that appears to be stable, however if it enlarges he will need an MRCP.   - RTC with me as needed unless there are any abnormalities on blood work above.     8/9/23:  - Blood work today shows no cytopenias.   - We discussed at this point given that he is having worsening abdominal symptoms, we recommend a repeat abdominal ultrasound to look at his spleen and liver.   - We discussed if no clear pathology and no worsening splenomegaly we will likely just monitor, however given the pain he may need further imaging with a CT scan.  - We will arrange for ultrasound and follow-up over the phone.   - RTC for phone visit to discuss ultrasound results.     5/10/24: Telephone visit   -     RTC . This note has been transcribed using a medical scribe and there is a possibility of unintentional typing misprints.       {[email or web address]:[id number]}    Katy Garcia MD  Hematology/Oncology  [facility]    Scribe Attestation  By signing my name below, I Milagro Hermes Guzman attest that this documentation has been prepared under the direction and in the presence of Katy Garcia MD.

## 2024-05-10 ENCOUNTER — APPOINTMENT (OUTPATIENT)
Dept: HEMATOLOGY/ONCOLOGY | Facility: CLINIC | Age: 79
End: 2024-05-10
Payer: COMMERCIAL

## 2024-05-14 NOTE — PROGRESS NOTES
Consent:  Verbal consent was requested and obtained from patient on this date for a telehealth visit.    Patient ID: Josue Benavidez is a 79 y.o. male.    Subjective    HPI  Mr. Nilo Benavidez is a 80 y/o M presenting for initial consultation at the request of Dr. Hunt, for anemia.      Most recent blood work on 2/22/2023, showed hemoglobin 11.2, which has been stable since October 2022. In September 2022, it did drop to 8.7. Iron studies back in December showed some iron deficiency with iron saturation 15%. HbA1c has been stable at  goal at 5.9%. He underwent an ultrasound of the spleen on 2/10/2023, which was mildly enlarged at 13.6 cm. No splenic lesions seen, however on repeat imaging on CT C/A/P on 2/28/2023, which was completed because he was having new abdominal pain, his spleen  appeared normal size with some mild cardiomegaly. No mediastinal, hilar or axillary lymphadenopathy. The liver was enlarged up to 22.6 cm with a few too small to completely characterize liver hypodensities which are unchanged form prior. Pancreas was  unremarkable. There is a stable 2.0 cm hypodensity which is unchanged since April 2018.      Patient reports that he continues to have left-sided abdominal pain that has slightly improved. He recalls that approximately 2 months ago he had a fall and then strained that area while trying to stand up according to his friend. He states that was when  the symptoms started. He denies any stool changes. Denies any unexplained weight loss. No recent fevers or chills. No other complaints. No couth, chest pain or SOB. Reports no new medications.      Interval Events  8/9/2023: Patient presents today for follow-up for normocytic anemia and splenomegaly. Most recent hemoglobin is 14.1 today with normal platelets and normal WBC.      Since last visit, patient reports intermittent abdominal pain, not always associated with exertion and not related to diet or trauma. Denies any recent infections,  fevers or chills. No dysphagia. No new chest pain, cough or SOB. No new night sweats. He  also reports intermittent lower extremity edema, however notes that he does tend to retain fluid. No other complaints today.     Since last visit, he had CT of the pancreas which showed a stable 1.3 cm fluid density lesion involving tail of pancreas.        5/15/24: A telephone visit (audio only) between the patient at home and the provider at [facility] [place] was utilized to provide this telehealth service.     Patient presents for follow-up for pancreatic lesion.     Most recent CT of the pancreas on 5/3/24 shows a stable 1.3 cm fluid  Density lesion involving the tail of the pancrease. No other findings of concern.    Patient reports that he has some issues with his sinuses that have been bothering him along with his allergies. Dr. Hunt has sent in an antibiotic that he plans to start taking. No other major concerns at this time.     Patient's past medical history, surgical history, family history and social history reviewed.    Review of Systems:   Review of Systems:    Positive per HPI, otherwise negative.   Objective      There were no vitals taken for this visit.     Physical Exam  Gen: appears well in clinic, NAD  HEENT: atraumatic head, normocephalic, EOMI, conjunctiva normal  LUNG: no increased WOB, CTAB  CV: No JVD. RRR  GI: soft, NT, ND  LE: no LE edema  Skin: no obvious rashes or lesions on visible skin  Neuro: interactive, no focal deficits noted  Psych: normal mood and affect  Performance Status:  Symptomatic; fully ambulatory    Labs/Imaging/Pathology: personally reviewed reports and images in Epic electronic medical record system. Pertinent results as it related to the plan represented in below in assessment and plan.   Assessment/Plan    1. Splenomegaly  - Patient was known to have some splenomegaly measuring up to 13 cm on his spleen ultrasound on 2/10/23, however CT C/A/P on 2/28/23 the  spleen measured about 10 cm on my review and measurement. It does not appear enlarged on imaging to me.   - Also has no new lymphadenopathy. No B symptoms.   - I do not think he needs further workup for the splenomegaly.       2. Normocytic anemia  - He has had persistent anemia for some time. Had a history of a GI bleed back in April and states he has not had issues since that time.  - He also has a history of diabetes and CKD, however it is well controlled.  - Discussed to complete workup for anemia we will repeat iron studies, ferritin and folate and repeat CBC and CMP. We will also check SPEP and serum free light chains to rule out a plasma cell dyscrasia and a reticulocyte count to assess bone marrow function.   - We will follow this blood work and discuss if he needs to be seen. Otherwise, he can follow-up as needed if there are any new symptoms.   - Discussed that I do not have a clear explanation for his left-sided abdominal pain, however I do not think that it is related to his splenomegaly. He believes it could be musculoskeletal and he will continue to monitor. Discussed if it was to persist,  he may need repeat imaging.  - Also discussed he has a pancreatic lesion that appears to be stable, however if it enlarges he will need an MRCP.   - RTC with me as needed unless there are any abnormalities on blood work above.     8/9/23:  - Blood work today shows no cytopenias.   - We discussed at this point given that he is having worsening abdominal symptoms, we recommend a repeat abdominal ultrasound to look at his spleen and liver.   - We discussed if no clear pathology and no worsening splenomegaly we will likely just monitor, however given the pain he may need further imaging with a CT scan.  - We will arrange for ultrasound and follow-up over the phone.   - RTC for phone visit to discuss ultrasound results.     5/15/24: Telephone visit  - Pancreatic cyst appears stable.  - I will send referral to pancreatic  clinic so they can continue to monitor, discussed with Deborah Mercer  - CBC show Hbg is normal staying above 13.  - Anemia is likely secondary to CKD.  - At this point no further work-up is needed.  - Patient knows if he develops any new cytopenias or other lab abnormalities he can call and I am happy to see him at any time.    RTC as needed. This note has been transcribed using a medical scribe and there is a possibility of unintentional typing misprints.         Katy Garcia MD  Hematology/Oncology  [facility]    Scribe Attestation  By signing my name below, I Milagroerik Guzman Scrpepper attest that this documentation has been prepared under the direction and in the presence of Katy Garcia MD.  .   20 min spent on this encounter

## 2024-05-15 ENCOUNTER — TELEMEDICINE (OUTPATIENT)
Dept: HEMATOLOGY/ONCOLOGY | Facility: CLINIC | Age: 79
End: 2024-05-15
Payer: COMMERCIAL

## 2024-05-15 DIAGNOSIS — J30.9 ALLERGIC RHINITIS, UNSPECIFIED SEASONALITY, UNSPECIFIED TRIGGER: ICD-10-CM

## 2024-05-15 DIAGNOSIS — K86.2 PANCREATIC CYST (HHS-HCC): Primary | ICD-10-CM

## 2024-05-15 PROCEDURE — 1160F RVW MEDS BY RX/DR IN RCRD: CPT | Performed by: INTERNAL MEDICINE

## 2024-05-15 PROCEDURE — 99213 OFFICE O/P EST LOW 20 MIN: CPT | Performed by: INTERNAL MEDICINE

## 2024-05-15 PROCEDURE — 1157F ADVNC CARE PLAN IN RCRD: CPT | Performed by: INTERNAL MEDICINE

## 2024-05-15 PROCEDURE — 1159F MED LIST DOCD IN RCRD: CPT | Performed by: INTERNAL MEDICINE

## 2024-05-15 RX ORDER — DOXYCYCLINE 100 MG/1
100 CAPSULE ORAL 2 TIMES DAILY
Qty: 28 CAPSULE | Refills: 0 | Status: SHIPPED | OUTPATIENT
Start: 2024-05-15 | End: 2024-05-29

## 2024-05-20 ENCOUNTER — OFFICE VISIT (OUTPATIENT)
Dept: SURGICAL ONCOLOGY | Facility: CLINIC | Age: 79
End: 2024-05-20
Payer: COMMERCIAL

## 2024-05-20 VITALS
BODY MASS INDEX: 42.06 KG/M2 | DIASTOLIC BLOOD PRESSURE: 71 MMHG | HEART RATE: 82 BPM | TEMPERATURE: 97.5 F | HEIGHT: 67 IN | WEIGHT: 267.97 LBS | SYSTOLIC BLOOD PRESSURE: 112 MMHG

## 2024-05-20 DIAGNOSIS — K86.2 PANCREATIC CYST (HHS-HCC): Primary | ICD-10-CM

## 2024-05-20 PROCEDURE — 1159F MED LIST DOCD IN RCRD: CPT | Performed by: PHYSICIAN ASSISTANT

## 2024-05-20 PROCEDURE — 1126F AMNT PAIN NOTED NONE PRSNT: CPT | Performed by: PHYSICIAN ASSISTANT

## 2024-05-20 PROCEDURE — 1160F RVW MEDS BY RX/DR IN RCRD: CPT | Performed by: PHYSICIAN ASSISTANT

## 2024-05-20 PROCEDURE — 3074F SYST BP LT 130 MM HG: CPT | Performed by: PHYSICIAN ASSISTANT

## 2024-05-20 PROCEDURE — 99214 OFFICE O/P EST MOD 30 MIN: CPT | Performed by: PHYSICIAN ASSISTANT

## 2024-05-20 PROCEDURE — 3078F DIAST BP <80 MM HG: CPT | Performed by: PHYSICIAN ASSISTANT

## 2024-05-20 PROCEDURE — 1157F ADVNC CARE PLAN IN RCRD: CPT | Performed by: PHYSICIAN ASSISTANT

## 2024-05-20 PROCEDURE — 99204 OFFICE O/P NEW MOD 45 MIN: CPT | Performed by: PHYSICIAN ASSISTANT

## 2024-05-20 PROCEDURE — 1036F TOBACCO NON-USER: CPT | Performed by: PHYSICIAN ASSISTANT

## 2024-05-20 ASSESSMENT — ENCOUNTER SYMPTOMS
DEPRESSION: 0
LOSS OF SENSATION IN FEET: 0
OCCASIONAL FEELINGS OF UNSTEADINESS: 0

## 2024-05-20 ASSESSMENT — PAIN SCALES - GENERAL: PAINLEVEL: 0-NO PAIN

## 2024-05-20 NOTE — PROGRESS NOTES
"Subjective   Mr. Benavidez is a 79-year-old male who is referred by Dr. Katy Garcia for a pancreatic tail cyst.     He had a pancreas protocol CT on 5/09/24 for surveillance of a pancreatic tail cyst, identified on imaging dating back to 2018. This showed a 1.3 cm x 1.3 cm cyst in the tail of the pancreas without enhancement of main pancreatic duct dilatation.      He was referred here to establish in my Pancreatic Cyst Surveillance Clinic.     He denies a personal history of acute pancreatitis. He denies chronic abdominal pain, early satiety, poor appetite, jaundice or unintentional weight loss.     Medical and surgical history: Moderate to severe mitral valve regurgitation, HTN, HLD, DM, CKD, ischemic cardiomyopathy s/p pacemaker, paroxysmal SVT, BRODIE, small bowel AVMs.   Family history: No family history of pancreatitis or pancreatic cancer.   Social history: Former smoker. Occasional alcohol use. No illicits.        Objective     /71   Pulse 82   Temp 36.4 °C (97.5 °F)   Ht 1.697 m (5' 6.81\")   Wt 122 kg (267 lb 15.5 oz)   BMI 42.21 kg/m²      Physical Exam  General: in no acute distress, comfortable  Eyes: no pallor or scleral icterus  Ears, nose, throat: no oropharyngeal edema  Cardiovascular: normal rate, regular rhythm  Respiratory: clear breath sounds, symmetric, no wheezes  Gastrointestinal: abdomen soft, non-tender, no masses  Musculoskeletal: normal gate, no deformities  Integumentary: no concerning lesions, no jaundice  Lymphatic: no abnormally palpable lymph nodes  Neurologic: no gross deficits  Psychiatric: cognition intact, mood appropriate    Assessment/Plan   Mr. Benavidez is a 79-year-old male who is referred by Dr. Katy Garcia for a pancreatic tail cyst.     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     I discussed that branch duct IPMNs represent potentially pre-malignant lesions and are managed based " on the presence or absence of high risk stigmata or concerning features including cyst size, cyst growth over time, enhancing mural nodule or main duct dilatation. Management decisions are based on these features, as well as, personal medical history, family history, presence or absence of symptoms and patient preference.    There are no high risk stigmata or worrisome features. I discussed annual surveillance vs discontinuing surveillance (given his age); he elects to continue surveillance. Will plan for pancreas protocol CT in 1 year, likely at [place]. He received IV hydration prior.       Deborah Mercer PA-C

## 2024-05-22 ENCOUNTER — OFFICE VISIT (OUTPATIENT)
Dept: PRIMARY CARE | Facility: CLINIC | Age: 79
End: 2024-05-22
Payer: COMMERCIAL

## 2024-05-22 ENCOUNTER — HOSPITAL ENCOUNTER (OUTPATIENT)
Dept: RADIOLOGY | Facility: HOSPITAL | Age: 79
Discharge: HOME | End: 2024-05-22
Payer: COMMERCIAL

## 2024-05-22 VITALS
DIASTOLIC BLOOD PRESSURE: 68 MMHG | HEART RATE: 64 BPM | SYSTOLIC BLOOD PRESSURE: 110 MMHG | BODY MASS INDEX: 42.06 KG/M2 | TEMPERATURE: 97.9 F | WEIGHT: 267 LBS

## 2024-05-22 DIAGNOSIS — I50.9 CHF (NYHA CLASS II, ACC/AHA STAGE C) (MULTI): ICD-10-CM

## 2024-05-22 DIAGNOSIS — E11.21 DIABETES MELLITUS WITH KIDNEY DISEASE (MULTI): ICD-10-CM

## 2024-05-22 DIAGNOSIS — J20.8 ACUTE BRONCHITIS DUE TO OTHER SPECIFIED ORGANISMS: Primary | ICD-10-CM

## 2024-05-22 DIAGNOSIS — J20.8 ACUTE BRONCHITIS DUE TO OTHER SPECIFIED ORGANISMS: ICD-10-CM

## 2024-05-22 PROCEDURE — 3074F SYST BP LT 130 MM HG: CPT | Performed by: INTERNAL MEDICINE

## 2024-05-22 PROCEDURE — 99213 OFFICE O/P EST LOW 20 MIN: CPT | Performed by: INTERNAL MEDICINE

## 2024-05-22 PROCEDURE — 71046 X-RAY EXAM CHEST 2 VIEWS: CPT

## 2024-05-22 PROCEDURE — 1036F TOBACCO NON-USER: CPT | Performed by: INTERNAL MEDICINE

## 2024-05-22 PROCEDURE — 1160F RVW MEDS BY RX/DR IN RCRD: CPT | Performed by: INTERNAL MEDICINE

## 2024-05-22 PROCEDURE — 1159F MED LIST DOCD IN RCRD: CPT | Performed by: INTERNAL MEDICINE

## 2024-05-22 PROCEDURE — 1157F ADVNC CARE PLAN IN RCRD: CPT | Performed by: INTERNAL MEDICINE

## 2024-05-22 PROCEDURE — 71046 X-RAY EXAM CHEST 2 VIEWS: CPT | Performed by: RADIOLOGY

## 2024-05-22 PROCEDURE — 3078F DIAST BP <80 MM HG: CPT | Performed by: INTERNAL MEDICINE

## 2024-05-22 RX ORDER — BENZONATATE 200 MG/1
200 CAPSULE ORAL 3 TIMES DAILY PRN
Qty: 42 CAPSULE | Refills: 0 | Status: SHIPPED | OUTPATIENT
Start: 2024-05-22 | End: 2024-06-21

## 2024-05-22 RX ORDER — PREDNISONE 20 MG/1
40 TABLET ORAL DAILY
Qty: 12 TABLET | Refills: 0 | Status: SHIPPED | OUTPATIENT
Start: 2024-05-22 | End: 2024-05-28

## 2024-05-22 ASSESSMENT — ENCOUNTER SYMPTOMS
MUSCULOSKELETAL NEGATIVE: 1
ALLERGIC/IMMUNOLOGIC NEGATIVE: 1
GASTROINTESTINAL NEGATIVE: 1
EYES NEGATIVE: 1
PSYCHIATRIC NEGATIVE: 1
HEMATOLOGIC/LYMPHATIC NEGATIVE: 1
NEUROLOGICAL NEGATIVE: 1
PALPITATIONS: 0
COUGH: 1
ENDOCRINE COMMENTS: DM

## 2024-05-22 ASSESSMENT — PATIENT HEALTH QUESTIONNAIRE - PHQ9
1. LITTLE INTEREST OR PLEASURE IN DOING THINGS: NOT AT ALL
SUM OF ALL RESPONSES TO PHQ9 QUESTIONS 1 & 2: 0
2. FEELING DOWN, DEPRESSED OR HOPELESS: NOT AT ALL

## 2024-05-22 NOTE — PROGRESS NOTES
Subjective   Patient ID: Josue Benavidez is a 79 y.o. male who presents for Cough (Pt here for cough x 3 weeks  phlegm clear).    Cough  Pertinent negatives include no chest pain.   Patient has been coughing for the last several weeks he had some greenish phlegm earlier no fever but today had coughing and wheezing he does have history of CHF diabetes history of CHF chronic kidney disease.  He was given doxycycline which did not really helped his cough.  The cough is clear phlegm no fever patient does have wheezing.  All 3 members in the household has had respiratory illness for last few weeks.  Patient should monitor sugars closely since prednisone will increase his blood sugars he has to really cut down on his carbs while on prednisone.    Review of Systems   HENT: Negative.     Eyes: Negative.    Respiratory:  Positive for cough.    Cardiovascular:  Negative for chest pain and palpitations.   Gastrointestinal: Negative.    Endocrine:        DM   Genitourinary: Negative.    Musculoskeletal: Negative.    Allergic/Immunologic: Negative.    Neurological: Negative.    Hematological: Negative.    Psychiatric/Behavioral: Negative.     All other systems reviewed and are negative.      Objective   /68   Pulse 64   Temp 36.6 °C (97.9 °F) (Temporal)   Wt 121 kg (267 lb)   BMI 42.06 kg/m²     Physical Exam  Vitals reviewed.   Constitutional:       Appearance: Normal appearance.   HENT:      Head: Normocephalic and atraumatic.   Eyes:      Pupils: Pupils are equal, round, and reactive to light.   Cardiovascular:      Rate and Rhythm: Normal rate and regular rhythm.   Pulmonary:      Effort: Pulmonary effort is normal.      Breath sounds: Rhonchi present. No rales.   Musculoskeletal:      Right lower leg: No edema.      Left lower leg: No edema.   Neurological:      General: No focal deficit present.      Mental Status: He is alert and oriented to person, place, and time.   Psychiatric:         Mood and Affect: Mood  normal.         Behavior: Behavior normal.         Thought Content: Thought content normal.         Assessment/Plan   Problem List Items Addressed This Visit             ICD-10-CM    CHF (NYHA class II, ACC/AHA stage C) (Multi) I50.9    Diabetes mellitus with kidney disease (Multi) E11.21    Acute bronchitis due to other specified organisms - Primary J20.8    Relevant Medications    predniSONE (Deltasone) 20 mg tablet    benzonatate (Tessalon) 200 mg capsule    Other Relevant Orders    XR chest 2 views   Patient will have chest x-ray to rule out pneumonia he has finished his doxycycline he is allergic to penicillin but is not allergic to cephalosporins.  He will be given prednisone continue with albuterol plus benzonatate and follow-up with us in 10 to 14 days.  Monitor blood sugars closely since prednisone is going to cause his blood sugars to go up he may need short term insulin or additional medicines for his blood sugar control.

## 2024-05-29 ENCOUNTER — HOSPITAL ENCOUNTER (EMERGENCY)
Facility: HOSPITAL | Age: 79
Discharge: HOME | End: 2024-05-30
Payer: MEDICARE

## 2024-05-29 ENCOUNTER — APPOINTMENT (OUTPATIENT)
Dept: CARDIOLOGY | Facility: HOSPITAL | Age: 79
End: 2024-05-29
Payer: MEDICARE

## 2024-05-29 ENCOUNTER — APPOINTMENT (OUTPATIENT)
Dept: RADIOLOGY | Facility: HOSPITAL | Age: 79
End: 2024-05-29
Payer: MEDICARE

## 2024-05-29 VITALS
HEIGHT: 68 IN | TEMPERATURE: 97.9 F | HEART RATE: 80 BPM | OXYGEN SATURATION: 94 % | BODY MASS INDEX: 39.4 KG/M2 | SYSTOLIC BLOOD PRESSURE: 152 MMHG | DIASTOLIC BLOOD PRESSURE: 70 MMHG | WEIGHT: 260 LBS | RESPIRATION RATE: 20 BRPM

## 2024-05-29 DIAGNOSIS — R05.1 ACUTE COUGH: Primary | ICD-10-CM

## 2024-05-29 DIAGNOSIS — J18.9 COMMUNITY ACQUIRED PNEUMONIA OF RIGHT LOWER LOBE OF LUNG: ICD-10-CM

## 2024-05-29 LAB
ALBUMIN SERPL BCP-MCNC: 4 G/DL (ref 3.4–5)
ALP SERPL-CCNC: 59 U/L (ref 33–136)
ALT SERPL W P-5'-P-CCNC: 32 U/L (ref 10–52)
ANION GAP SERPL CALC-SCNC: 12 MMOL/L (ref 10–20)
APTT PPP: 32 SECONDS (ref 27–38)
AST SERPL W P-5'-P-CCNC: 30 U/L (ref 9–39)
ATRIAL RATE: 78 BPM
BASOPHILS # BLD AUTO: 0.02 X10*3/UL (ref 0–0.1)
BASOPHILS NFR BLD AUTO: 0.1 %
BILIRUB SERPL-MCNC: 1 MG/DL (ref 0–1.2)
BNP SERPL-MCNC: 104 PG/ML (ref 0–99)
BUN SERPL-MCNC: 45 MG/DL (ref 6–23)
CALCIUM SERPL-MCNC: 9.2 MG/DL (ref 8.6–10.3)
CARDIAC TROPONIN I PNL SERPL HS: 15 NG/L (ref 0–20)
CHLORIDE SERPL-SCNC: 94 MMOL/L (ref 98–107)
CO2 SERPL-SCNC: 31 MMOL/L (ref 21–32)
CREAT SERPL-MCNC: 1.64 MG/DL (ref 0.5–1.3)
EGFRCR SERPLBLD CKD-EPI 2021: 42 ML/MIN/1.73M*2
EOSINOPHIL # BLD AUTO: 0.03 X10*3/UL (ref 0–0.4)
EOSINOPHIL NFR BLD AUTO: 0.2 %
ERYTHROCYTE [DISTWIDTH] IN BLOOD BY AUTOMATED COUNT: 15.2 % (ref 11.5–14.5)
GLUCOSE SERPL-MCNC: 143 MG/DL (ref 74–99)
HCT VFR BLD AUTO: 41.5 % (ref 41–52)
HGB BLD-MCNC: 13.7 G/DL (ref 13.5–17.5)
IMM GRANULOCYTES # BLD AUTO: 0.11 X10*3/UL (ref 0–0.5)
IMM GRANULOCYTES NFR BLD AUTO: 0.7 % (ref 0–0.9)
INR PPP: 1 (ref 0.9–1.1)
LYMPHOCYTES # BLD AUTO: 0.83 X10*3/UL (ref 0.8–3)
LYMPHOCYTES NFR BLD AUTO: 5.4 %
MCH RBC QN AUTO: 32.1 PG (ref 26–34)
MCHC RBC AUTO-ENTMCNC: 33 G/DL (ref 32–36)
MCV RBC AUTO: 97 FL (ref 80–100)
MONOCYTES # BLD AUTO: 2.23 X10*3/UL (ref 0.05–0.8)
MONOCYTES NFR BLD AUTO: 14.6 %
NEUTROPHILS # BLD AUTO: 12.02 X10*3/UL (ref 1.6–5.5)
NEUTROPHILS NFR BLD AUTO: 79 %
NRBC BLD-RTO: 0 /100 WBCS (ref 0–0)
P AXIS: 118 DEGREES
PLATELET # BLD AUTO: 201 X10*3/UL (ref 150–450)
POTASSIUM SERPL-SCNC: 3.7 MMOL/L (ref 3.5–5.3)
PROT SERPL-MCNC: 7.4 G/DL (ref 6.4–8.2)
PROTHROMBIN TIME: 11.8 SECONDS (ref 9.8–12.8)
Q ONSET: 192 MS
QRS COUNT: 13 BEATS
QRS DURATION: 170 MS
QT INTERVAL: 422 MS
QTC CALCULATION(BAZETT): 481 MS
QTC FREDERICIA: 460 MS
R AXIS: 115 DEGREES
RBC # BLD AUTO: 4.27 X10*6/UL (ref 4.5–5.9)
SARS-COV-2 RNA RESP QL NAA+PROBE: NOT DETECTED
SODIUM SERPL-SCNC: 133 MMOL/L (ref 136–145)
T AXIS: -36 DEGREES
T OFFSET: 403 MS
VENTRICULAR RATE: 78 BPM
WBC # BLD AUTO: 15.2 X10*3/UL (ref 4.4–11.3)

## 2024-05-29 PROCEDURE — 71250 CT THORAX DX C-: CPT

## 2024-05-29 PROCEDURE — 87635 SARS-COV-2 COVID-19 AMP PRB: CPT

## 2024-05-29 PROCEDURE — 99285 EMERGENCY DEPT VISIT HI MDM: CPT | Mod: 25

## 2024-05-29 PROCEDURE — 96361 HYDRATE IV INFUSION ADD-ON: CPT

## 2024-05-29 PROCEDURE — 84484 ASSAY OF TROPONIN QUANT: CPT

## 2024-05-29 PROCEDURE — 85025 COMPLETE CBC W/AUTO DIFF WBC: CPT

## 2024-05-29 PROCEDURE — 85610 PROTHROMBIN TIME: CPT

## 2024-05-29 PROCEDURE — 2500000002 HC RX 250 W HCPCS SELF ADMINISTERED DRUGS (ALT 637 FOR MEDICARE OP, ALT 636 FOR OP/ED)

## 2024-05-29 PROCEDURE — 2500000004 HC RX 250 GENERAL PHARMACY W/ HCPCS (ALT 636 FOR OP/ED)

## 2024-05-29 PROCEDURE — 94640 AIRWAY INHALATION TREATMENT: CPT

## 2024-05-29 PROCEDURE — 36415 COLL VENOUS BLD VENIPUNCTURE: CPT

## 2024-05-29 PROCEDURE — 93005 ELECTROCARDIOGRAM TRACING: CPT

## 2024-05-29 PROCEDURE — 96374 THER/PROPH/DIAG INJ IV PUSH: CPT

## 2024-05-29 PROCEDURE — 80053 COMPREHEN METABOLIC PANEL: CPT

## 2024-05-29 PROCEDURE — 83880 ASSAY OF NATRIURETIC PEPTIDE: CPT

## 2024-05-29 PROCEDURE — 71250 CT THORAX DX C-: CPT | Performed by: RADIOLOGY

## 2024-05-29 RX ORDER — IPRATROPIUM BROMIDE AND ALBUTEROL SULFATE 2.5; .5 MG/3ML; MG/3ML
3 SOLUTION RESPIRATORY (INHALATION)
Qty: 360 ML | Refills: 0 | Status: SHIPPED | OUTPATIENT
Start: 2024-05-29 | End: 2024-05-29

## 2024-05-29 RX ORDER — OXYMETAZOLINE HCL 0.05 %
2 SPRAY, NON-AEROSOL (ML) NASAL EVERY 12 HOURS PRN
Qty: 30 ML | Refills: 0 | Status: SHIPPED | OUTPATIENT
Start: 2024-05-29 | End: 2024-05-29

## 2024-05-29 RX ORDER — OXYMETAZOLINE HCL 0.05 %
2 SPRAY, NON-AEROSOL (ML) NASAL EVERY 12 HOURS PRN
Qty: 30 ML | Refills: 0 | Status: SHIPPED | OUTPATIENT
Start: 2024-05-29 | End: 2024-05-31

## 2024-05-29 RX ORDER — LEVOFLOXACIN 250 MG/1
750 TABLET ORAL DAILY
Qty: 15 TABLET | Refills: 0 | Status: SHIPPED | OUTPATIENT
Start: 2024-05-29 | End: 2024-05-29

## 2024-05-29 RX ORDER — IPRATROPIUM BROMIDE AND ALBUTEROL SULFATE 2.5; .5 MG/3ML; MG/3ML
3 SOLUTION RESPIRATORY (INHALATION)
Qty: 360 ML | Refills: 0 | Status: SHIPPED | OUTPATIENT
Start: 2024-05-29 | End: 2024-06-28

## 2024-05-29 RX ORDER — LEVOFLOXACIN 250 MG/1
750 TABLET ORAL DAILY
Qty: 15 TABLET | Refills: 0 | Status: SHIPPED | OUTPATIENT
Start: 2024-05-29 | End: 2024-06-03

## 2024-05-29 RX ORDER — IPRATROPIUM BROMIDE AND ALBUTEROL SULFATE 2.5; .5 MG/3ML; MG/3ML
3 SOLUTION RESPIRATORY (INHALATION) EVERY 20 MIN
Status: COMPLETED | OUTPATIENT
Start: 2024-05-29 | End: 2024-05-29

## 2024-05-29 RX ADMIN — IPRATROPIUM BROMIDE AND ALBUTEROL SULFATE 3 ML: 2.5; .5 SOLUTION RESPIRATORY (INHALATION) at 15:30

## 2024-05-29 RX ADMIN — SODIUM CHLORIDE 500 ML: 9 INJECTION, SOLUTION INTRAVENOUS at 16:19

## 2024-05-29 RX ADMIN — IPRATROPIUM BROMIDE AND ALBUTEROL SULFATE 3 ML: 2.5; .5 SOLUTION RESPIRATORY (INHALATION) at 15:56

## 2024-05-29 RX ADMIN — IPRATROPIUM BROMIDE AND ALBUTEROL SULFATE 3 ML: 2.5; .5 SOLUTION RESPIRATORY (INHALATION) at 15:50

## 2024-05-29 RX ADMIN — METHYLPREDNISOLONE SODIUM SUCCINATE 125 MG: 125 INJECTION, POWDER, FOR SOLUTION INTRAMUSCULAR; INTRAVENOUS at 16:19

## 2024-05-29 ASSESSMENT — PAIN SCALES - GENERAL: PAINLEVEL_OUTOF10: 0 - NO PAIN

## 2024-05-29 ASSESSMENT — LIFESTYLE VARIABLES
TOTAL SCORE: 0
HAVE YOU EVER FELT YOU SHOULD CUT DOWN ON YOUR DRINKING: NO
EVER HAD A DRINK FIRST THING IN THE MORNING TO STEADY YOUR NERVES TO GET RID OF A HANGOVER: NO
EVER FELT BAD OR GUILTY ABOUT YOUR DRINKING: NO
HAVE PEOPLE ANNOYED YOU BY CRITICIZING YOUR DRINKING: NO

## 2024-05-29 ASSESSMENT — COLUMBIA-SUICIDE SEVERITY RATING SCALE - C-SSRS
6. HAVE YOU EVER DONE ANYTHING, STARTED TO DO ANYTHING, OR PREPARED TO DO ANYTHING TO END YOUR LIFE?: NO
2. HAVE YOU ACTUALLY HAD ANY THOUGHTS OF KILLING YOURSELF?: NO
1. IN THE PAST MONTH, HAVE YOU WISHED YOU WERE DEAD OR WISHED YOU COULD GO TO SLEEP AND NOT WAKE UP?: NO

## 2024-05-29 ASSESSMENT — PAIN - FUNCTIONAL ASSESSMENT: PAIN_FUNCTIONAL_ASSESSMENT: 0-10

## 2024-05-29 NOTE — ED PROVIDER NOTES
HPI   Chief Complaint   Patient presents with    Cough     Cough x 2-3 weeks.  On antibiotics and not helping.       History provided by: Patient    Limitations to history: None    CC: Cough    HPI: 79-year-old male with a history of ventricular tachycardia, CAD, osteoarthritis, CHF, hypertension, COPD presents the emergency department to be evaluated for cough.  Patient has had this persistent cough for about 3 weeks now he.  He says it is primarily dry but every once a while will be productive with acutely or mucus.  He originally followed up with his primary care provider and was started on doxycycline and prednisone after being diagnosed with a bronchitis.  He finished the steroids earlier this week and states that he is just not getting any better.  States the cough is keeping him up at night.  He denies having chest pain or feeling short of breath.  He does have a history of CAD with 6 stents, his last catheterization was done 2 years ago and did not require any further intervention.  He has a history of CHF and is on spironolactone and torsemide, denies any swelling in his legs.  Denies history of DVTs or PEs and denies recent plane flights or recent surgeries, denies history of cancer.  Denies use of anticoagulants or antiplatelets.  Denies pleuritic pain or hemoptysis.  Denies fever and chills.  Denies body aches.  He does report congestion and rhinorrhea, is not on any decongestions.  He also has been taking Mucinex and Tessalon Perles and his symptoms are not getting much better.  Denies weakness and fatigue.  Denies all GI  complaints including nausea vomiting, diarrhea constipation, blood in the urine or stool.  Denies urgency frequency and dysuria.  Denies all other systemic symptoms.    ROS: Negative unless mentioned in HPI    Social Hx: Former smoker.  Occasional alcohol use.  Denies drug use.    Medical Hx: Allergy to caffeine, codeine, lisinopril, penicillin, shellfish containing  products.    Physical exam:    Constitutional: Patient is obese and well-developed.  He does frequently have a harsh cough.  Sitting comfortably in the room and in no distress.  Oriented to person, place, time, and situation.    HEENT: Head is normocephalic, atraumatic. Patient's airway is patent.  Tympanic membranes are clear bilaterally.  Nasal mucosa clear.  Mouth with normal mucosa.  Throat is not erythematous and there are no oropharyngeal exudates, uvula is midline.  No obvious facial deformities.    Eyes: Clear bilaterally.  Pupils are equal round and reactive to light and accommodation.  Extraocular movements intact.      Cardiac: Regular rate, regular rhythm.  Heart sounds S1, S2.  No murmurs, rubs, or gallops.  PMI nondisplaced.  No JVD.    Respiratory: 95% on room air.  Regular respiratory rate and effort.   breath sounds are equal in all fields bilaterally, he does have some rhonchi noted on exam especially in the bases. Patient is speaking in full sentences and is in no apparent respiratory distress. No use of accessory muscles.      Gastrointestinal: Abdomen is soft, nondistended, and nontender.  There are no obvious deformities.  No rebound tenderness or guarding.  Bowel sounds are normal active.    Genitourinary: No CVA or flank tenderness.    Musculoskeletal: No reproducible tenderness.  No obvious skin or bony deformities.  Patient has equal range of motion in all extremities and no strength deficiencies.  No muscle or joint tenderness. No back or neck tenderness.  Capillary refill less than 3 seconds.  Strong peripheral pulses.  No sensory deficits.    Neurological: Patient is alert and oriented.  No focal deficits.  5/5 strength in all extremities.  Cranial nerves II through XII intact. GCS15.     Skin: Skin is normal color for race and is warm, dry, and intact.  No evidence of trauma.  No lesions, rashes, bruising, jaundice, or masses.    Psych: Appropriate mood and affect.  No apparent risk to  self or others.    Heme/lymph: No adenopathy, lymphadenopathy, or splenomegaly    Physical exam is otherwise negative unless stated above or in history of present illness.                          Diane Coma Scale Score: 15                     Patient History   Past Medical History:   Diagnosis Date    Other ventricular tachycardia (Multi)     Ventricular tachycardia (paroxysmal)    Personal history of diseases of the blood and blood-forming organs and certain disorders involving the immune mechanism 2021    History of bleeding disorder    Personal history of other diseases of the circulatory system 2021    History of cardiac disorder    Personal history of other diseases of the musculoskeletal system and connective tissue 2021    History of arthritis    Personal history of other specified conditions 2021    History of chest pain     Past Surgical History:   Procedure Laterality Date    OTHER SURGICAL HISTORY  2021    Pacemaker insertion    OTHER SURGICAL HISTORY  2022    Colonoscopy    OTHER SURGICAL HISTORY  2022    Cataract surgery    OTHER SURGICAL HISTORY  2022    Cardioverter defibrillator insertion    OTHER SURGICAL HISTORY  2022    Median nerve neuroplasty    OTHER SURGICAL HISTORY  2022    Cardiac catheterization with stent placement    OTHER SURGICAL HISTORY  2022    Esophagogastroduodenoscopy    OTHER SURGICAL HISTORY  2022    Percutaneous transluminal coronary angioplasty     Family History   Problem Relation Name Age of Onset    Diabetes Mother      Valvular heart disease Mother      Hypertension Father       Social History     Tobacco Use    Smoking status: Former     Current packs/day: 0.00     Average packs/day: 0.5 packs/day for 43.0 years (21.5 ttl pk-yrs)     Types: Cigarettes     Start date:      Quit date:      Years since quittin.4    Smokeless tobacco: Never   Substance Use Topics    Alcohol use: Yes      Comment: social, 1x monthly    Drug use: Never       Physical Exam   ED Triage Vitals   Temperature Heart Rate Respirations BP   05/29/24 1512 05/29/24 1508 05/29/24 1508 05/29/24 1508   36.6 °C (97.9 °F) 87 20 131/59      Pulse Ox Temp Source Heart Rate Source Patient Position   05/29/24 1512 05/29/24 1512 05/29/24 1508 05/29/24 1508   95 % Temporal Monitor Sitting      BP Location FiO2 (%)     05/29/24 1508 --     Right arm        Physical Exam    ED Course & MDM   Diagnoses as of 05/29/24 1711   Acute cough   Community acquired pneumonia of right lower lobe of lung     Patient updated on plan for lab testing, IV insertion, radiology imaging, and medications to be administered while in the ER (if indicated). Patient updated on expected wait times for testing and results. Patient provided my name and told to ask any staff member for questions or concerns if they should arise. Electronic medical record reviewed.     MDM    Upon initial assessment, patient was healthy non-toxic appearing and in no apparent distress.     Patient presented to the emergency department with the chief complaint persistent cough with runny nose and congestion. 95% on room air.  Regular respiratory rate and effort.   breath sounds are equal in all fields bilaterally, he does have some rhonchi noted on exam especially in the bases. Patient is speaking in full sentences and is in no apparent respiratory distress. No use of accessory muscles.  No muffled heart sounds, JVD, murmur.  No peripheral edema or erythema.  On arrival to the emergency department, vital signs were within normal limits    Will give the patient a breathing treatment and Solu-Medrol given his persistent and harsh cough with a history of COPD.  Will give the patient half liter of IV normal saline, with withhold additional fluids until acute heart failure is ruled out.  Will get basic blood work, EKG and troponin, BNP, COVID PCR.  Low suspicion for PE at this time based on  his history and physical exam, patient's Wells criteria is 0.  Will also order a dry CT scan of the chest for further evaluation for developing pneumonia.    Patient's EKG is performed at 1558 interpreted by me.  Ventricular paced rhythm 78 bpm.  No ST elevation or depression.  No prolonged QT.  It overall looks very similar to his previous.    Patient states that he is feeling much better after the breathing treatments and his cough has improved.  Updated him on his results.  His troponin is 15 making ACS unlikely.  COVID PCR is negative.  BNP is 104.  CBC does show a white count 15.2 with a left shift neutrophil count.  This could represent bacterial infection but also could be from his recent steroid use.  CMP reveals hyponatremia 133.  Patient's kidney function is similar to baseline.  Patient CT shows bronchiolitis versus atypical pneumonia versus superimposed pneumonia on the right lower side.  Given that the patient has had this cough for 3 weeks and now has a white count we will cover him with Levaquin.  After the patient admission and discussed the benefits of this however after long discussion with the patient and shared decision-making he feels well and would prefer to go home.  We did get a walking pulse ox that he does not visually disconnect.  His pulse ox did not drop below 92% so he did not require supplemental oxygen.  I gave him very strict return precautions.  He does not have a nebulizer machine or breathing treatments at home, he does have an inhaler.  He will be discharged with the Levaquin and DuoNeb breathing treatments.  Will also give him a  nebulizer machine here.  Reluctant to give the patient additional steroids given that he just finished his 5-day course 3 days ago, prolonged steroids could compromise his immune system.  He will follow-up promptly with his PCP.  Will also give him Afrin for his congestion.  All questions and concerns addressed.  Reasons to return to ER discussed.   Patient verbalized understanding and agreement with the treatment plan and they remained hemodynamically stable in the ER.    This note was dictated using a speech recognition program.  While an attempt was made at proof-reading to minimize errors, minor errors in transcription may be present    Medical Decision Making      Procedure  Procedures     Luis Downs PA-C  05/29/24 4671

## 2024-06-04 ENCOUNTER — APPOINTMENT (OUTPATIENT)
Dept: CARDIOLOGY | Facility: HOSPITAL | Age: 79
End: 2024-06-04
Payer: COMMERCIAL

## 2024-06-04 ENCOUNTER — HOSPITAL ENCOUNTER (OUTPATIENT)
Dept: CARDIOLOGY | Facility: HOSPITAL | Age: 79
Discharge: HOME | End: 2024-06-04
Payer: MEDICARE

## 2024-06-04 ENCOUNTER — OFFICE VISIT (OUTPATIENT)
Dept: CARDIOLOGY | Facility: CLINIC | Age: 79
End: 2024-06-04
Payer: MEDICARE

## 2024-06-04 VITALS
HEIGHT: 68 IN | DIASTOLIC BLOOD PRESSURE: 60 MMHG | SYSTOLIC BLOOD PRESSURE: 98 MMHG | HEART RATE: 66 BPM | BODY MASS INDEX: 39.53 KG/M2

## 2024-06-04 DIAGNOSIS — Z95.810 PRESENCE OF AUTOMATIC CARDIOVERTER/DEFIBRILLATOR (AICD): ICD-10-CM

## 2024-06-04 DIAGNOSIS — Z71.89 ENCOUNTER FOR MEDICATION REVIEW AND COUNSELING: ICD-10-CM

## 2024-06-04 DIAGNOSIS — Z71.89 ENCOUNTER TO DISCUSS TREATMENT OPTIONS: ICD-10-CM

## 2024-06-04 DIAGNOSIS — Z95.810 ICD (IMPLANTABLE CARDIOVERTER-DEFIBRILLATOR), BIVENTRICULAR, IN SITU: Primary | ICD-10-CM

## 2024-06-04 DIAGNOSIS — I44.7 LBBB (LEFT BUNDLE BRANCH BLOCK): ICD-10-CM

## 2024-06-04 DIAGNOSIS — I10 BENIGN ESSENTIAL HYPERTENSION: ICD-10-CM

## 2024-06-04 DIAGNOSIS — G47.30 SLEEP APNEA, UNSPECIFIED TYPE: ICD-10-CM

## 2024-06-04 DIAGNOSIS — I25.5 ISCHEMIC CARDIOMYOPATHY: ICD-10-CM

## 2024-06-04 DIAGNOSIS — I50.22 CHRONIC SYSTOLIC CONGESTIVE HEART FAILURE (MULTI): ICD-10-CM

## 2024-06-04 DIAGNOSIS — E78.2 MIXED HYPERLIPIDEMIA: ICD-10-CM

## 2024-06-04 DIAGNOSIS — I47.10 PAROXYSMAL SVT (SUPRAVENTRICULAR TACHYCARDIA) (CMS-HCC): ICD-10-CM

## 2024-06-04 PROCEDURE — 93284 PRGRMG EVAL IMPLANTABLE DFB: CPT | Performed by: INTERNAL MEDICINE

## 2024-06-04 PROCEDURE — 93284 PRGRMG EVAL IMPLANTABLE DFB: CPT

## 2024-06-04 PROCEDURE — 99214 OFFICE O/P EST MOD 30 MIN: CPT | Performed by: INTERNAL MEDICINE

## 2024-06-04 PROCEDURE — 1159F MED LIST DOCD IN RCRD: CPT | Performed by: INTERNAL MEDICINE

## 2024-06-04 PROCEDURE — 1157F ADVNC CARE PLAN IN RCRD: CPT | Performed by: INTERNAL MEDICINE

## 2024-06-04 PROCEDURE — 3078F DIAST BP <80 MM HG: CPT | Performed by: INTERNAL MEDICINE

## 2024-06-04 PROCEDURE — 3074F SYST BP LT 130 MM HG: CPT | Performed by: INTERNAL MEDICINE

## 2024-06-04 PROCEDURE — 93000 ELECTROCARDIOGRAM COMPLETE: CPT | Mod: DISTINCT PROCEDURAL SERVICE | Performed by: INTERNAL MEDICINE

## 2024-06-04 PROCEDURE — 1036F TOBACCO NON-USER: CPT | Performed by: INTERNAL MEDICINE

## 2024-06-04 ASSESSMENT — ENCOUNTER SYMPTOMS
WHEEZING: 1
SYNCOPE: 0
COUGH: 1
SHORTNESS OF BREATH: 1
NEAR-SYNCOPE: 0
SPUTUM PRODUCTION: 1
IRREGULAR HEARTBEAT: 0

## 2024-06-04 NOTE — PATIENT INSTRUCTIONS
Continue same medications and treatments.   Patient educated on proper medication use.   Patient educated on risk factor modification.   Please bring any lab results from other providers / physicians to your next appointment.     Please bring all medicines, vitamins, and herbal supplements with you when you come to the office.     Prescriptions will not be filled unless you are compliant with your follow up appointments or have a follow up appointment scheduled as per instruction of your physician. Refills should be requested at the time of your visit.    FOLLOW UP IN 6 AND 12 MONTHS WITH Dr. Kristi Hsieh MD, FACC, FACP, FHRS WITH A DEVICE CHECK    REMOTE CHECKS IN 3 AND 9 MONTHS    Skyla HURT LPN, am scribing for and in the presence of Dr. Kristi Hsieh MD, FACC, FACP, FHRS

## 2024-06-04 NOTE — PROGRESS NOTES
"Chief Complaint:   Follow-up (6 month follow up with [facility] )     History Of Present Illness:    Josue Benavidez Jr. is a 79 y.o. male presenting with follow-up.  He is accompanied by his friend.    He feels okay from a heart rhythm standpoint.  He denies any palpitation, near-syncope, or syncope.    He had pneumonia recently.  He is using nebulizers and completed 5-day course of antibiotics.  He has follow-up with Dr. Rice in the near future.  Last Recorded Vitals:  Vitals:    06/04/24 1501   BP: 98/60   BP Location: Left arm   Patient Position: Sitting   Pulse: 66   Height: 1.727 m (5' 8\")       Past Medical History:  See list  Past Surgical History:  See list      Social History:  He reports that he quit smoking about 21 years ago. His smoking use included cigarettes. He started smoking about 64 years ago. He has a 21.5 pack-year smoking history. He has never used smokeless tobacco. He reports current alcohol use. He reports that he does not use drugs.    Family History:  Family History   Problem Relation Name Age of Onset    Diabetes Mother      Valvular heart disease Mother      Hypertension Father          Allergies:  Caffeine, Codeine, Lisinopril, Penicillins, and Shellfish containing products    Outpatient Medications:  Current Outpatient Medications   Medication Instructions    acetaminophen (TYLENOL) 500 mg, oral, Every 6 hours PRN    albuterol 90 mcg/actuation inhaler 2 puffs, inhalation, 3 times daily RT    allopurinol (ZYLOPRIM) 200 mg, oral, Daily    ascorbic acid (VITAMIN C) 500 mg, oral, Daily    benzonatate (TESSALON) 200 mg, oral, 3 times daily PRN, Do not crush or chew.    blood sugar diagnostic (Blood Glucose Test) strip Test twice daily.    blood sugar diagnostic (True Metrix Glucose Test Strip) strip Use 1 strip twice daily.    carvedilol (COREG) 25 mg, oral, 2 times daily    fluticasone (Flonase) 50 mcg/actuation nasal spray 1 spray, Each Nostril, Daily    gabapentin (NEURONTIN) 300 mg, oral, " Daily PRN    glimepiride (AMARYL) 4 mg, oral, 2 times daily    ipratropium-albuteroL (Duo-Neb) 0.5-2.5 mg/3 mL nebulizer solution 3 mL, nebulization, Every 6 hours RT    lancets misc miscellaneous, Use to test once daily as directed.    losartan (COZAAR) 25 mg, oral, Daily    magnesium oxide (MAG-OX) 400 mg, oral, Daily    metOLazone (ZAROXOLYN) 2.5 mg, oral, Daily PRN    montelukast (SINGULAIR) 10 mg, oral, Nightly    nitroglycerin (NITROSTAT) 0.4 mg, sublingual, As needed    NON FORMULARY Vitamin D 50 MCG (2000 UT) Oral Tablet; Take PO as directed.    oxymetazoline (Afrin) 0.05 % nasal spray 2 sprays, Each Nostril, Every 12 hours PRN, Do not use for more than 3 days.    pantoprazole (ProtoNix) 40 mg EC tablet TAKE 1 TABLET BY MOUTH ONCE  DAILY    simvastatin (ZOCOR) 20 mg, oral, Nightly    spironolactone (ALDACTONE) 50 mg, oral, Daily    torsemide (Demadex) 100 mg tablet oral, 2 times daily     Review of Systems   Constitutional: Positive for malaise/fatigue.   Cardiovascular:  Positive for leg swelling. Negative for irregular heartbeat, near-syncope and syncope.   Respiratory:  Positive for cough, shortness of breath, sputum production and wheezing.    All other systems reviewed and are negative.        Physical Exam:  Constitutional:       Appearance: Healthy appearance. Not in distress.   Neck:      Vascular: No JVR. JVD normal.   Pulmonary:      Effort: Pulmonary effort is normal.      Breath sounds: Normal breath sounds. No wheezing. No rhonchi. No rales.   Chest:      Chest wall: Not tender to palpatation.   Cardiovascular:      PMI at left midclavicular line. Normal rate. Regular rhythm. Normal S1. Normal S2.       Murmurs: There is no murmur.      No gallop.  No click. No rub.   Pulses:     Intact distal pulses.   Edema:     Peripheral edema present.     Pretibial: 1+ edema of the left pretibial area.     Ankle: 1+ edema of the left ankle.  Abdominal:      General: Bowel sounds are normal.      Palpations:  Abdomen is soft.      Tenderness: There is no abdominal tenderness.   Musculoskeletal: Normal range of motion.         General: No tenderness. Skin:     General: Skin is warm and dry.   Neurological:      General: No focal deficit present.      Mental Status: Alert and oriented to person, place and time.          Last Labs:  CBC -  Lab Results   Component Value Date    WBC 15.2 (H) 05/29/2024    HGB 13.7 05/29/2024    HCT 41.5 05/29/2024    MCV 97 05/29/2024     05/29/2024       CMP -  Lab Results   Component Value Date    CALCIUM 9.2 05/29/2024    PHOS 3.3 06/07/2023    PROT 7.4 05/29/2024    ALBUMIN 4.0 05/29/2024    AST 30 05/29/2024    ALT 32 05/29/2024    ALKPHOS 59 05/29/2024    BILITOT 1.0 05/29/2024       LIPID PANEL -   Lab Results   Component Value Date    CHOL 112 12/12/2022    TRIG 136 12/12/2022    HDL 31.6 (A) 12/12/2022    CHHDL 3.5 12/12/2022    LDLF 53 12/12/2022    VLDL 27 12/12/2022    NHDL 91 11/30/2020       RENAL FUNCTION PANEL -   Lab Results   Component Value Date    GLUCOSE 143 (H) 05/29/2024     (L) 05/29/2024    K 3.7 05/29/2024    CL 94 (L) 05/29/2024    CO2 31 05/29/2024    ANIONGAP 12 05/29/2024    BUN 45 (H) 05/29/2024    CREATININE 1.64 (H) 05/29/2024    GFRMALE 39 (A) 08/09/2023    CALCIUM 9.2 05/29/2024    PHOS 3.3 06/07/2023    ALBUMIN 4.0 05/29/2024        Lab Results   Component Value Date     (H) 05/29/2024    HGBA1C 6.0 (H) 12/08/2023       Last Cardiology Tests:  ECG:  ECG 12 lead 05/29/2024    Today.  Normal sinus rhythm.  Appropriate ventricular pacing.  Right axis deviation.  Paced QT interval 450 ms    Device check today Medtronic DTPA 2 D1 BiV ICD.  Estimated longevity device over 1 year.  72% atrial pacing.  99% RV pacing.  100% BiV pacing.  0 A-fib.  0 VT.    Lab review: I have personally reviewed the laboratory result(s) see above    Assessment/Plan   Diagnoses and all orders for this visit:  ICD (implantable cardioverter-defibrillator),  biventricular, in situ  Paroxysmal SVT (supraventricular tachycardia) ([facility])  -     ECG 12 lead (Clinic Performed)  Chronic systolic congestive heart failure (Multi)  Sleep apnea, unspecified type  Benign essential hypertension  LBBB (left bundle branch block)  Encounter for medication review and counseling  Encounter to discuss treatment options  Mixed hyperlipidemia      Chronic systolic heart failure. Refractory heart failure and LBBB, s/p upgrade to biventricular device in 2008 and generator change in 2016. Stable.   Sinus node dysfunction Chronotropic incompetence.  Previously adjusted sensor by walk test.  No indication to adjust sensor presently.  Medtronic [id number] biventricular device. On field alert.  Previously adjusted vectors.  Discussed mechanical complication of device. Reviewed device check with patient and friend.  Ordered device checks.  Will continue to alternate remote checks with device clinic paired with EP office visit.  History of new RV lead due to RV fracture. Chronic. Stable with prior lead capped.  Recurrent ventricular tachycardia. Chronic. Stable. Reviewed medications. Continue meds. Discussed refills.  Ischemic cardiomyopathy. Chronic. Stable. LVEF 30% by echo 10/18. Lexiscan LVEF 25%. Presence of scar and substrate for VT. Medications reviewed.  Asymptomatic on medications.  Discussed refills.  New [place] Heart Association 2C heart failure. Chronic. Stable. Chronic systolic and diastolic heart failure since 2003. Reviewed medications. Continue same medications.  Discussed refills.  PSVT. Chronic. Stable. No recent episodes. Reviewed medications. Continue meds.  Refills discussed.  Coronary artery disease. Stable, chronic. Remote percutaneous coronary intervention and stents. Remote myocardial infarction. Asymptomatic. Reviewed medications. Continue medications. Discussed refills.  Hypertension, benign, chronic, controlled. Stable. Reviewed medications. Continue medications.  Refills  discussed.  Valvular heart disease. Chronic. Stable.  Has follow-up with Dr. Ricci in the near future to reevaluate valvular heart disease and whether other referral indicated for valve replacement.  Pancreatic?  Cyst.  Undergoing surveillance scans.  Incidental adrenal gland mass noted on CT scan.  Recommend follow-up with PCP.  Upper respiratory infection.  Resolving.  Has follow-up with pulmonary.  I did discuss that he may have increased heart rates and arrhythmias with acute respiratory illness.  History of mild hypokalemia.   CKD stage III. Chronic. Stable. Reviewed medications.  Obstructive sleep apnea, chronic. Stable. On CPAP. Discussed again the association of sleep apnea and arrhythmias. Reenforced compliance with CPAP.   Diabetes mellitus. Chronic. Stable.  Chronic back, right lower leg pain and knee swelling, stable. Severe osteoarthritis. Ambulates with cane. Follows with pain clinic.  Colonic polyps, Chronic. Stable. Reviewed medications. Tolerates aspirin.   Hyperlipidemia, chronic. Stable. Reviewed medications. On statin.  Overweight.    AHA recommendations for exercise, diet, and behavioral modification reviewed with pt.     Counseling over 50% visit performed. The patient , friend, and I discussed the mechanism of arrhythmia, ECG, field alert device, device check,  previous lead fracture,  indications for and types of medications, discussion if and what medication refills needed, treatment options, risks, benefits, and imponderables. American Heart Association lifestyle changes and behavioral modification discussed. All questions answered in detail. . Patient appreciative of care.       Kristi Hsieh MD

## 2024-06-07 ENCOUNTER — HOSPITAL ENCOUNTER (OUTPATIENT)
Dept: CARDIOLOGY | Facility: CLINIC | Age: 79
Discharge: HOME | End: 2024-06-07
Payer: MEDICARE

## 2024-06-07 VITALS
DIASTOLIC BLOOD PRESSURE: 60 MMHG | SYSTOLIC BLOOD PRESSURE: 100 MMHG | WEIGHT: 260 LBS | BODY MASS INDEX: 39.4 KG/M2 | HEIGHT: 68 IN

## 2024-06-07 DIAGNOSIS — Z98.890 S/P MITRAL VALVE CLIP IMPLANTATION: ICD-10-CM

## 2024-06-07 DIAGNOSIS — I34.0 NONRHEUMATIC MITRAL VALVE REGURGITATION: ICD-10-CM

## 2024-06-07 DIAGNOSIS — Z95.818 S/P MITRAL VALVE CLIP IMPLANTATION: ICD-10-CM

## 2024-06-07 LAB
AORTIC VALVE MEAN GRADIENT: 10 MMHG
AORTIC VALVE PEAK VELOCITY: 2.07 M/S
AV PEAK GRADIENT: 17.1 MMHG
AVA (PEAK VEL): 1.79 CM2
AVA (VTI): 1.62 CM2
EJECTION FRACTION APICAL 4 CHAMBER: 30.6
LEFT VENTRICLE INTERNAL DIMENSION DIASTOLE: 6.5 CM (ref 3.5–6)
LEFT VENTRICULAR OUTFLOW TRACT DIAMETER: 2.1 CM
LV EJECTION FRACTION BIPLANE: 31 %
MITRAL VALVE E/A RATIO: 1.5
MITRAL VALVE E/E' RATIO: 33.8
RIGHT VENTRICLE PEAK SYSTOLIC PRESSURE: 35.5 MMHG

## 2024-06-07 PROCEDURE — 93306 TTE W/DOPPLER COMPLETE: CPT

## 2024-06-07 PROCEDURE — 2500000004 HC RX 250 GENERAL PHARMACY W/ HCPCS (ALT 636 FOR OP/ED): Performed by: INTERNAL MEDICINE

## 2024-06-07 PROCEDURE — 93306 TTE W/DOPPLER COMPLETE: CPT | Performed by: INTERNAL MEDICINE

## 2024-06-07 RX ADMIN — HUMAN ALBUMIN MICROSPHERES AND PERFLUTREN 0.5 ML: 10; .22 INJECTION, SOLUTION INTRAVENOUS at 14:00

## 2024-06-14 ENCOUNTER — APPOINTMENT (OUTPATIENT)
Dept: CARDIOLOGY | Facility: CLINIC | Age: 79
End: 2024-06-14
Payer: MEDICARE

## 2024-06-21 ENCOUNTER — APPOINTMENT (OUTPATIENT)
Dept: CARDIOLOGY | Facility: CLINIC | Age: 79
End: 2024-06-21
Payer: MEDICARE

## 2024-06-21 ENCOUNTER — LAB (OUTPATIENT)
Dept: LAB | Facility: LAB | Age: 79
End: 2024-06-21
Payer: MEDICARE

## 2024-06-21 VITALS
HEART RATE: 80 BPM | DIASTOLIC BLOOD PRESSURE: 58 MMHG | WEIGHT: 265.9 LBS | BODY MASS INDEX: 40.3 KG/M2 | HEIGHT: 68 IN | SYSTOLIC BLOOD PRESSURE: 98 MMHG

## 2024-06-21 DIAGNOSIS — I25.2 PAST MYOCARDIAL INFARCTION: ICD-10-CM

## 2024-06-21 DIAGNOSIS — I34.0 NONRHEUMATIC MITRAL VALVE REGURGITATION: ICD-10-CM

## 2024-06-21 DIAGNOSIS — E78.2 MIXED HYPERLIPIDEMIA: ICD-10-CM

## 2024-06-21 DIAGNOSIS — Z95.818 S/P MITRAL VALVE CLIP IMPLANTATION: ICD-10-CM

## 2024-06-21 DIAGNOSIS — Z95.810 ICD (IMPLANTABLE CARDIOVERTER-DEFIBRILLATOR), BIVENTRICULAR, IN SITU: ICD-10-CM

## 2024-06-21 DIAGNOSIS — I47.10 PAROXYSMAL SVT (SUPRAVENTRICULAR TACHYCARDIA) (CMS-HCC): ICD-10-CM

## 2024-06-21 DIAGNOSIS — I10 ESSENTIAL HYPERTENSION, BENIGN: ICD-10-CM

## 2024-06-21 DIAGNOSIS — D50.0 ANEMIA, BLOOD LOSS: ICD-10-CM

## 2024-06-21 DIAGNOSIS — N18.30 CHRONIC KIDNEY DISEASE, STAGE 3 UNSPECIFIED (MULTI): ICD-10-CM

## 2024-06-21 DIAGNOSIS — I65.23 BILATERAL CAROTID ARTERY STENOSIS: ICD-10-CM

## 2024-06-21 DIAGNOSIS — E55.9 VITAMIN D DEFICIENCY, UNSPECIFIED: Primary | ICD-10-CM

## 2024-06-21 DIAGNOSIS — E11.21 DIABETES MELLITUS WITH KIDNEY DISEASE (MULTI): ICD-10-CM

## 2024-06-21 DIAGNOSIS — I50.32 CHRONIC DIASTOLIC HEART FAILURE (MULTI): ICD-10-CM

## 2024-06-21 DIAGNOSIS — R21 SKIN RASH: ICD-10-CM

## 2024-06-21 DIAGNOSIS — Z87.891 FORMER CIGARETTE SMOKER: ICD-10-CM

## 2024-06-21 DIAGNOSIS — Z98.890 S/P MITRAL VALVE CLIP IMPLANTATION: ICD-10-CM

## 2024-06-21 LAB
25(OH)D3 SERPL-MCNC: 69 NG/ML (ref 30–100)
ALBUMIN SERPL BCP-MCNC: 3.7 G/DL (ref 3.4–5)
ANION GAP SERPL CALC-SCNC: 11 MMOL/L (ref 10–20)
BUN SERPL-MCNC: 24 MG/DL (ref 6–23)
CALCIUM SERPL-MCNC: 8.6 MG/DL (ref 8.6–10.3)
CHLORIDE SERPL-SCNC: 104 MMOL/L (ref 98–107)
CO2 SERPL-SCNC: 26 MMOL/L (ref 21–32)
CREAT SERPL-MCNC: 1.4 MG/DL (ref 0.5–1.3)
EGFRCR SERPLBLD CKD-EPI 2021: 51 ML/MIN/1.73M*2
ERYTHROCYTE [DISTWIDTH] IN BLOOD BY AUTOMATED COUNT: 15 % (ref 11.5–14.5)
GLUCOSE SERPL-MCNC: 129 MG/DL (ref 74–99)
HCT VFR BLD AUTO: 41.3 % (ref 41–52)
HGB BLD-MCNC: 13 G/DL (ref 13.5–17.5)
MCH RBC QN AUTO: 31.3 PG (ref 26–34)
MCHC RBC AUTO-ENTMCNC: 31.5 G/DL (ref 32–36)
MCV RBC AUTO: 100 FL (ref 80–100)
NRBC BLD-RTO: 0 /100 WBCS (ref 0–0)
PHOSPHATE SERPL-MCNC: 2.3 MG/DL (ref 2.5–4.9)
PLATELET # BLD AUTO: 174 X10*3/UL (ref 150–450)
POTASSIUM SERPL-SCNC: 4.1 MMOL/L (ref 3.5–5.3)
RBC # BLD AUTO: 4.15 X10*6/UL (ref 4.5–5.9)
SODIUM SERPL-SCNC: 137 MMOL/L (ref 136–145)
WBC # BLD AUTO: 4.5 X10*3/UL (ref 4.4–11.3)

## 2024-06-21 PROCEDURE — 36415 COLL VENOUS BLD VENIPUNCTURE: CPT

## 2024-06-21 PROCEDURE — 83970 ASSAY OF PARATHORMONE: CPT

## 2024-06-21 PROCEDURE — 1036F TOBACCO NON-USER: CPT | Performed by: INTERNAL MEDICINE

## 2024-06-21 PROCEDURE — 3074F SYST BP LT 130 MM HG: CPT | Performed by: INTERNAL MEDICINE

## 2024-06-21 PROCEDURE — 85027 COMPLETE CBC AUTOMATED: CPT

## 2024-06-21 PROCEDURE — 99214 OFFICE O/P EST MOD 30 MIN: CPT | Performed by: INTERNAL MEDICINE

## 2024-06-21 PROCEDURE — 80069 RENAL FUNCTION PANEL: CPT

## 2024-06-21 PROCEDURE — 82306 VITAMIN D 25 HYDROXY: CPT

## 2024-06-21 PROCEDURE — 1159F MED LIST DOCD IN RCRD: CPT | Performed by: INTERNAL MEDICINE

## 2024-06-21 PROCEDURE — 3078F DIAST BP <80 MM HG: CPT | Performed by: INTERNAL MEDICINE

## 2024-06-21 PROCEDURE — 1157F ADVNC CARE PLAN IN RCRD: CPT | Performed by: INTERNAL MEDICINE

## 2024-06-21 RX ORDER — SPIRONOLACTONE 25 MG/1
25 TABLET ORAL DAILY
Qty: 180 TABLET | Refills: 3 | OUTPATIENT
Start: 2024-06-21

## 2024-06-21 NOTE — PROGRESS NOTES
Referred by Dr. Medina ref. provider found provider found for   Chief Complaint   Patient presents with    Follow-up     6 month follow up        History of Present Illness  Nilo Benavidez Jr. is a 79 y.o. year old male patient with history of heart failure and moderate mitral regurgitation.  Ejection fraction by echo was about 30% with mitral regurg but moderate degree.  He is feeling better and apparently he is less short of breath and is not retaining as much fluid.  His blood pressure over is about 98 systolic.  I reviewed his medication and asked him to cut back his spironolactone to once a day and recheck his blood pressure.  Will recheck his echo for mitral regurgitation in the future.  Follow-up with me as scheduled he does have follow-up with his nephrologist to check his kidney functions    Past Medical History  Past Medical History:   Diagnosis Date    Other ventricular tachycardia (Multi)     Ventricular tachycardia (paroxysmal)    Personal history of diseases of the blood and blood-forming organs and certain disorders involving the immune mechanism 2021    History of bleeding disorder    Personal history of other diseases of the circulatory system 2021    History of cardiac disorder    Personal history of other diseases of the musculoskeletal system and connective tissue 2021    History of arthritis    Personal history of other specified conditions 2021    History of chest pain       Social History  Social History     Tobacco Use    Smoking status: Former     Current packs/day: 0.00     Average packs/day: 0.5 packs/day for 43.0 years (21.5 ttl pk-yrs)     Types: Cigarettes     Start date:      Quit date:      Years since quittin.4    Smokeless tobacco: Never   Substance Use Topics    Alcohol use: Yes     Comment: social, 1x monthly    Drug use: Never       Family History     Family History   Problem Relation Name Age of Onset    Diabetes Mother      Valvular heart  disease Mother      Hypertension Father         Review of Systems  As per HPI, all other systems reviewed and negative.    Allergies:  Allergies   Allergen Reactions    Caffeine Other     Hypotension    Codeine Unknown     Codeine Derivatives    Lisinopril Unknown    Penicillins Unknown    Shellfish Containing Products Unknown        Outpatient Medications:  Current Outpatient Medications   Medication Instructions    acetaminophen (TYLENOL) 500 mg, oral, Every 6 hours PRN    albuterol 90 mcg/actuation inhaler 2 puffs, inhalation, 3 times daily RT    allopurinol (ZYLOPRIM) 200 mg, oral, Daily    ascorbic acid (VITAMIN C) 500 mg, oral, Daily    benzonatate (TESSALON) 200 mg, oral, 3 times daily PRN, Do not crush or chew.    blood sugar diagnostic (Blood Glucose Test) strip Test twice daily.    blood sugar diagnostic (True Metrix Glucose Test Strip) strip Use 1 strip twice daily.    carvedilol (COREG) 25 mg, oral, 2 times daily    gabapentin (NEURONTIN) 300 mg, oral, Daily PRN    glimepiride (AMARYL) 4 mg, oral, 2 times daily    ipratropium-albuteroL (Duo-Neb) 0.5-2.5 mg/3 mL nebulizer solution 3 mL, nebulization, Every 6 hours RT    lancets misc miscellaneous, Use to test once daily as directed.    losartan (COZAAR) 25 mg, oral, Daily    magnesium oxide (MAG-OX) 400 mg, oral, Daily    metOLazone (ZAROXOLYN) 2.5 mg, oral, Daily PRN    montelukast (SINGULAIR) 10 mg, oral, Nightly    nitroglycerin (NITROSTAT) 0.4 mg, sublingual, As needed    NON FORMULARY Vitamin D 50 MCG (2000 UT) Oral Tablet; Take PO as directed.    oxymetazoline (Afrin) 0.05 % nasal spray 2 sprays, Each Nostril, Every 12 hours PRN, Do not use for more than 3 days.    pantoprazole (ProtoNix) 40 mg EC tablet TAKE 1 TABLET BY MOUTH ONCE  DAILY    simvastatin (ZOCOR) 20 mg, oral, Nightly    spironolactone (ALDACTONE) 50 mg, oral, Daily    torsemide (Demadex) 100 mg tablet oral, 2 times daily         Vitals:  Vitals:    06/21/24 1435   BP: 98/58   Pulse:  80       Physical Exam:  Physical Exam  Cardiovascular:      Rate and Rhythm: Normal rate.      Pulses: Normal pulses.      Heart sounds: Normal heart sounds.   Pulmonary:      Effort: Pulmonary effort is normal.   Neurological:      General: No focal deficit present.      Mental Status: He is alert.             Assessment/Plan   Problem List Items Addressed This Visit       Mitral regurgitation    Bilateral carotid artery stenosis    Chronic diastolic heart failure (Multi)    Diabetes mellitus with kidney disease (Multi)    ICD (implantable cardioverter-defibrillator), biventricular, in situ    Mixed hyperlipidemia    Paroxysmal SVT (supraventricular tachycardia) (CMS-HCC)    Past myocardial infarction    S/P mitral valve clip implantation    Skin rash    Essential hypertension, benign    Former cigarette smoker    BMI 40.0-44.9, adult (Multi)           Bhanu Ricci MD [facility]  Interventional Cardiology   of [facility]     Thank you for allowing me to participate in the care of this patient. Please do not hesitate to contact me with any further questions or concerns.

## 2024-06-22 LAB — PTH-INTACT SERPL-MCNC: 108.6 PG/ML (ref 18.5–88)

## 2024-06-24 ENCOUNTER — SPECIALTY PHARMACY (OUTPATIENT)
Dept: PHARMACY | Facility: CLINIC | Age: 79
End: 2024-06-24

## 2024-06-24 DIAGNOSIS — I10 BENIGN ESSENTIAL HYPERTENSION: ICD-10-CM

## 2024-06-24 PROCEDURE — RXMED WILLOW AMBULATORY MEDICATION CHARGE

## 2024-06-24 NOTE — TELEPHONE ENCOUNTER
Received request for prescription refill for patient.  Patient follows with Dr. Bhanu Ricci MD, [facility]     Request is for metolazone  Is patient currently on medication- yes PRN     Last OV- 6/21/24  Next OV- 12/20/24    Pended for signing and sent to provider.

## 2024-06-25 ENCOUNTER — PHARMACY VISIT (OUTPATIENT)
Dept: PHARMACY | Facility: CLINIC | Age: 79
End: 2024-06-25
Payer: COMMERCIAL

## 2024-06-25 RX ORDER — METOLAZONE 2.5 MG/1
2.5 TABLET ORAL DAILY PRN
Qty: 90 TABLET | Refills: 0 | Status: SHIPPED | OUTPATIENT
Start: 2024-06-25 | End: 2024-09-23

## 2024-07-02 DIAGNOSIS — E11.69 TYPE 2 DIABETES MELLITUS WITH OTHER SPECIFIED COMPLICATION, WITHOUT LONG-TERM CURRENT USE OF INSULIN (MULTI): ICD-10-CM

## 2024-07-02 DIAGNOSIS — R07.9 CHEST PAIN, UNSPECIFIED TYPE: ICD-10-CM

## 2024-07-02 RX ORDER — NITROGLYCERIN 0.4 MG/1
TABLET SUBLINGUAL
Qty: 25 TABLET | Refills: 5 | Status: SHIPPED | OUTPATIENT
Start: 2024-07-02

## 2024-07-02 RX ORDER — GLIMEPIRIDE 4 MG/1
4 TABLET ORAL 2 TIMES DAILY
Qty: 200 TABLET | Refills: 1 | Status: SHIPPED | OUTPATIENT
Start: 2024-07-02

## 2024-07-02 NOTE — TELEPHONE ENCOUNTER
Received request for prescription refill for patient.  Patient follows with Dr. Bhanu Ricci MD, [facility]     Request is for nitroglycerin  Is patient currently on medication- PRN    Last OV- 6/21/24  Next OV- 12/20/24    Pended for signing and sent to provider.

## 2024-07-10 ENCOUNTER — APPOINTMENT (OUTPATIENT)
Dept: ORTHOPEDIC SURGERY | Facility: CLINIC | Age: 79
End: 2024-07-10
Payer: MEDICARE

## 2024-07-10 DIAGNOSIS — M70.61 TROCHANTERIC BURSITIS OF RIGHT HIP: Primary | ICD-10-CM

## 2024-07-10 PROCEDURE — 1036F TOBACCO NON-USER: CPT | Performed by: ORTHOPAEDIC SURGERY

## 2024-07-10 PROCEDURE — 1157F ADVNC CARE PLAN IN RCRD: CPT | Performed by: ORTHOPAEDIC SURGERY

## 2024-07-10 PROCEDURE — 99213 OFFICE O/P EST LOW 20 MIN: CPT | Performed by: ORTHOPAEDIC SURGERY

## 2024-07-10 PROCEDURE — 1159F MED LIST DOCD IN RCRD: CPT | Performed by: ORTHOPAEDIC SURGERY

## 2024-07-10 NOTE — PROGRESS NOTES
History of present illness: History of right hip trochanteric bursa with an injection 6 months out still doing well    Physical exam:    General: No acute distress or breathing difficulty or discomfort, pleasant and cooperative with the examination.    Extremities: Right hip injection sites clean and dry    Hip flexion is beyond 100 abduction to 30    Negative Monique sign    No Trendelenburg gait    Straight leg raise sign is negative he can logroll without pain discomfort neurovascular intact he can plantarflex dorsiflex toes foot and ankle    Diagnostic studies:      Impression: Resolving trochanteric bursitis with an injection 6 months ago    Plan: Follow-up as needed for repeat eval injection

## 2024-07-19 ENCOUNTER — APPOINTMENT (OUTPATIENT)
Dept: PRIMARY CARE | Facility: CLINIC | Age: 79
End: 2024-07-19
Payer: MEDICARE

## 2024-07-19 VITALS
TEMPERATURE: 97.9 F | HEART RATE: 64 BPM | DIASTOLIC BLOOD PRESSURE: 64 MMHG | WEIGHT: 269 LBS | BODY MASS INDEX: 40.77 KG/M2 | HEIGHT: 68 IN | SYSTOLIC BLOOD PRESSURE: 102 MMHG

## 2024-07-19 DIAGNOSIS — D50.9 IRON DEFICIENCY ANEMIA, UNSPECIFIED IRON DEFICIENCY ANEMIA TYPE: ICD-10-CM

## 2024-07-19 DIAGNOSIS — E11.69 TYPE 2 DIABETES MELLITUS WITH OTHER SPECIFIED COMPLICATION, WITHOUT LONG-TERM CURRENT USE OF INSULIN (MULTI): Primary | ICD-10-CM

## 2024-07-19 DIAGNOSIS — Z95.810 ICD (IMPLANTABLE CARDIOVERTER-DEFIBRILLATOR), BIVENTRICULAR, IN SITU: ICD-10-CM

## 2024-07-19 DIAGNOSIS — Z95.818 S/P MITRAL VALVE CLIP IMPLANTATION: ICD-10-CM

## 2024-07-19 DIAGNOSIS — Z86.79 HISTORY OF ISCHEMIC CARDIOMYOPATHY: ICD-10-CM

## 2024-07-19 DIAGNOSIS — I50.22 CHRONIC SYSTOLIC CONGESTIVE HEART FAILURE (MULTI): ICD-10-CM

## 2024-07-19 DIAGNOSIS — I25.10 ASHD (ARTERIOSCLEROTIC HEART DISEASE): ICD-10-CM

## 2024-07-19 DIAGNOSIS — I34.0 MITRAL VALVE INSUFFICIENCY, UNSPECIFIED ETIOLOGY: ICD-10-CM

## 2024-07-19 DIAGNOSIS — I10 BENIGN ESSENTIAL HYPERTENSION: ICD-10-CM

## 2024-07-19 DIAGNOSIS — Z98.890 S/P MITRAL VALVE CLIP IMPLANTATION: ICD-10-CM

## 2024-07-19 DIAGNOSIS — N25.81 SECONDARY HYPERPARATHYROIDISM (MULTI): ICD-10-CM

## 2024-07-19 PROCEDURE — 99214 OFFICE O/P EST MOD 30 MIN: CPT | Performed by: INTERNAL MEDICINE

## 2024-07-19 PROCEDURE — 1157F ADVNC CARE PLAN IN RCRD: CPT | Performed by: INTERNAL MEDICINE

## 2024-07-19 PROCEDURE — 3078F DIAST BP <80 MM HG: CPT | Performed by: INTERNAL MEDICINE

## 2024-07-19 PROCEDURE — 1036F TOBACCO NON-USER: CPT | Performed by: INTERNAL MEDICINE

## 2024-07-19 PROCEDURE — 3074F SYST BP LT 130 MM HG: CPT | Performed by: INTERNAL MEDICINE

## 2024-07-19 PROCEDURE — 1159F MED LIST DOCD IN RCRD: CPT | Performed by: INTERNAL MEDICINE

## 2024-07-19 ASSESSMENT — ENCOUNTER SYMPTOMS
RESPIRATORY NEGATIVE: 1
GASTROINTESTINAL NEGATIVE: 1
NEUROLOGICAL NEGATIVE: 1
MUSCULOSKELETAL NEGATIVE: 1
PSYCHIATRIC NEGATIVE: 1
CARDIOVASCULAR NEGATIVE: 1
CONSTITUTIONAL NEGATIVE: 1

## 2024-07-19 ASSESSMENT — PATIENT HEALTH QUESTIONNAIRE - PHQ9
2. FEELING DOWN, DEPRESSED OR HOPELESS: NOT AT ALL
SUM OF ALL RESPONSES TO PHQ9 QUESTIONS 1 & 2: 0
1. LITTLE INTEREST OR PLEASURE IN DOING THINGS: NOT AT ALL

## 2024-07-19 NOTE — PROGRESS NOTES
"Subjective   Patient ID: Josue Benavidez Jr. is a 79 y.o. male who presents for Follow-up (Pt here for office visit   no labs for this visit still using nebulizer once a day  still has some cough/ drainage).    HPI patient is doing better.  Cough is improved.  He is down to nebulizers 1/3-day.  Occasionally takes decongestant spray and Mucinex.    He has been started on Jardiance for CHF by Dr. Ricci.  Creatinine function stable.    Review of Systems   Constitutional: Negative.    HENT:  Positive for congestion.    Respiratory: Negative.     Cardiovascular: Negative.    Gastrointestinal: Negative.    Genitourinary: Negative.    Musculoskeletal: Negative.    Skin:         Psoriasis of ear   Neurological: Negative.    Psychiatric/Behavioral: Negative.     All other systems reviewed and are negative.      Objective   /64   Pulse 64   Temp 36.6 °C (97.9 °F) (Temporal)   Ht 1.727 m (5' 8\")   Wt 122 kg (269 lb)   BMI 40.90 kg/m²     Physical Exam  Vitals reviewed.   Constitutional:       Appearance: Normal appearance.   HENT:      Head: Normocephalic.   Eyes:      Pupils: Pupils are equal, round, and reactive to light.   Cardiovascular:      Rate and Rhythm: Normal rate.   Pulmonary:      Effort: Pulmonary effort is normal.      Breath sounds: Normal breath sounds.   Abdominal:      Palpations: Abdomen is soft. There is no mass.   Musculoskeletal:      Right lower leg: No edema.   Neurological:      General: No focal deficit present.      Mental Status: He is alert.   Psychiatric:         Mood and Affect: Mood normal.         Behavior: Behavior normal.         Thought Content: Thought content normal.       Assessment/Plan   Problem List Items Addressed This Visit             ICD-10-CM    ASHD (arteriosclerotic heart disease) I25.10    Mitral regurgitation I34.0    Benign essential hypertension I10    Chronic systolic congestive heart failure (Multi) I50.22    History of ischemic cardiomyopathy Z86.79    ICD " (implantable cardioverter-defibrillator), biventricular, in situ Z95.810    Iron deficiency anemia D50.9    S/P mitral valve clip implantation Z98.890, Z95.818    Secondary hyperparathyroidism (Multi) N25.81     Other Visit Diagnoses         Codes    Type 2 diabetes mellitus with other specified complication, without long-term current use of insulin (Multi)    -  Primary E11.69            Patient should follow 1800 ADA diet and be active for 30 minutes a day 5 days a week.  Diet exercise weight loss recommended to reduce cardiometabolic risk.  Given losing 5 to 10 pounds weight can reduce risk of health related problems.  Continue torsemide spironolactone and now he is on Jardiance also for CHF he has moderate mitral regurg repeat echo in December he had MitraClip procedure done couple years ago.  Patient hemoglobin stable at 13 he does have a history of iron deficiency anemia.  Continue with losartan for hypertension and CHF he uses metolazone as needed when he gets weight gain.  He will continue glimepiride for diabetes.   He has secondary hyperparathyroidism secondary to renal disease/CKD 3

## 2024-07-23 ENCOUNTER — SPECIALTY PHARMACY (OUTPATIENT)
Dept: PHARMACY | Facility: CLINIC | Age: 79
End: 2024-07-23

## 2024-07-27 NOTE — PROGRESS NOTES
Therapy                            Cancellation/No-show Note      Date:  2022  Patient Name:  Jahaira Syed  :  1945   MRN:  [id number]  Referring Practitioner: FRANK Sinclair CNP  Diagnosis: Lumbar spondylosis and R knee arthritis    Visit Information:  PT Visit Information  Onset Date: 20  PT Insurance Information: Medicare/Medical [place]  Total # of Visits Approved:  (BMN)  Total # of Visits to Date: 4  Plan of Care/Certification Expiration Date: 22  No Show: 0  Canceled Appointment: 1  Progress Note Counter:  (next PN due 22)    ( 2022 pt cancelled  body sore)    For today's appointment patient:  [x]  Cancelled  []  Rescheduled appointment  []  No-show   []  Called pt to remind of next appointment     Reason given by patient:  []  Patient ill  []  Conflicting appointment  []  No transportation    []  Conflict with work  []  No reason given  [x]  Other:   Low back and legs are sore    [x] Pt has future appointments scheduled, no follow up needed  [] Pt requests to be on hold.     Reason:   If > 2 weeks please discuss with therapist.  [] Therapist to call pt for follow up     Comments:       Signature: Electronically signed by Paul Gibbs PTA on 22 at 12:12 PM EST
no

## 2024-08-02 DIAGNOSIS — I10 PRIMARY HYPERTENSION: ICD-10-CM

## 2024-08-05 RX ORDER — LOSARTAN POTASSIUM 25 MG/1
25 TABLET ORAL DAILY
Qty: 100 TABLET | Refills: 3 | Status: SHIPPED | OUTPATIENT
Start: 2024-08-05

## 2024-08-22 DIAGNOSIS — I10 BENIGN ESSENTIAL HYPERTENSION: ICD-10-CM

## 2024-08-26 DIAGNOSIS — I50.32 CHRONIC DIASTOLIC HEART FAILURE (MULTI): ICD-10-CM

## 2024-08-26 DIAGNOSIS — I10 BENIGN ESSENTIAL HYPERTENSION: ICD-10-CM

## 2024-08-26 RX ORDER — TORSEMIDE 100 MG/1
TABLET ORAL 2 TIMES DAILY
Qty: 100 TABLET | Refills: 2 | Status: SHIPPED | OUTPATIENT
Start: 2024-08-26

## 2024-08-27 RX ORDER — METOLAZONE 2.5 MG/1
2.5 TABLET ORAL AS NEEDED
Qty: 90 TABLET | Refills: 3 | Status: SHIPPED | OUTPATIENT
Start: 2024-08-27 | End: 2025-08-27

## 2024-08-27 NOTE — TELEPHONE ENCOUNTER
Received request for prescription refills for patient.   Patient follows with Dr. Ricci     Request is for Metolazone 2.5mg every day PRN  Is patient currently on medication yes    Last OV 6/21/24  Next OV 12/20/24    Pended for signing and sent to provider

## 2024-09-02 PROCEDURE — RXMED WILLOW AMBULATORY MEDICATION CHARGE

## 2024-09-10 ENCOUNTER — HOSPITAL ENCOUNTER (OUTPATIENT)
Dept: CARDIOLOGY | Facility: HOSPITAL | Age: 79
Discharge: HOME | End: 2024-09-10
Payer: MEDICARE

## 2024-09-10 DIAGNOSIS — Z95.810 ICD (IMPLANTABLE CARDIOVERTER-DEFIBRILLATOR), BIVENTRICULAR, IN SITU: ICD-10-CM

## 2024-09-10 PROCEDURE — 93295 DEV INTERROG REMOTE 1/2/MLT: CPT | Performed by: INTERNAL MEDICINE

## 2024-09-10 PROCEDURE — 93296 REM INTERROG EVL PM/IDS: CPT

## 2024-09-12 ENCOUNTER — SPECIALTY PHARMACY (OUTPATIENT)
Dept: PHARMACY | Facility: CLINIC | Age: 79
End: 2024-09-12

## 2024-09-16 ENCOUNTER — PHARMACY VISIT (OUTPATIENT)
Dept: PHARMACY | Facility: CLINIC | Age: 79
End: 2024-09-16
Payer: COMMERCIAL

## 2024-10-15 ENCOUNTER — LAB (OUTPATIENT)
Dept: LAB | Facility: LAB | Age: 79
End: 2024-10-15
Payer: MEDICARE

## 2024-10-15 DIAGNOSIS — E11.69 TYPE 2 DIABETES MELLITUS WITH OTHER SPECIFIED COMPLICATION, WITHOUT LONG-TERM CURRENT USE OF INSULIN: ICD-10-CM

## 2024-10-15 DIAGNOSIS — D50.9 IRON DEFICIENCY ANEMIA, UNSPECIFIED IRON DEFICIENCY ANEMIA TYPE: ICD-10-CM

## 2024-10-15 LAB
ALBUMIN SERPL BCP-MCNC: 4.2 G/DL (ref 3.4–5)
ALP SERPL-CCNC: 73 U/L (ref 33–136)
ALT SERPL W P-5'-P-CCNC: 18 U/L (ref 10–52)
ANION GAP SERPL CALC-SCNC: 15 MMOL/L (ref 10–20)
AST SERPL W P-5'-P-CCNC: 22 U/L (ref 9–39)
BILIRUB SERPL-MCNC: 0.5 MG/DL (ref 0–1.2)
BUN SERPL-MCNC: 52 MG/DL (ref 6–23)
CALCIUM SERPL-MCNC: 9.5 MG/DL (ref 8.6–10.3)
CHLORIDE SERPL-SCNC: 91 MMOL/L (ref 98–107)
CO2 SERPL-SCNC: 31 MMOL/L (ref 21–32)
CREAT SERPL-MCNC: 1.77 MG/DL (ref 0.5–1.3)
EGFRCR SERPLBLD CKD-EPI 2021: 39 ML/MIN/1.73M*2
ERYTHROCYTE [DISTWIDTH] IN BLOOD BY AUTOMATED COUNT: 14.8 % (ref 11.5–14.5)
EST. AVERAGE GLUCOSE BLD GHB EST-MCNC: 143 MG/DL
GLUCOSE SERPL-MCNC: 194 MG/DL (ref 74–99)
HBA1C MFR BLD: 6.6 %
HCT VFR BLD AUTO: 45.8 % (ref 41–52)
HGB BLD-MCNC: 15 G/DL (ref 13.5–17.5)
MCH RBC QN AUTO: 31.8 PG (ref 26–34)
MCHC RBC AUTO-ENTMCNC: 32.8 G/DL (ref 32–36)
MCV RBC AUTO: 97 FL (ref 80–100)
NRBC BLD-RTO: 0 /100 WBCS (ref 0–0)
PLATELET # BLD AUTO: 207 X10*3/UL (ref 150–450)
POTASSIUM SERPL-SCNC: 4 MMOL/L (ref 3.5–5.3)
PROT SERPL-MCNC: 6.9 G/DL (ref 6.4–8.2)
RBC # BLD AUTO: 4.72 X10*6/UL (ref 4.5–5.9)
SODIUM SERPL-SCNC: 133 MMOL/L (ref 136–145)
WBC # BLD AUTO: 9.3 X10*3/UL (ref 4.4–11.3)

## 2024-10-15 PROCEDURE — 83036 HEMOGLOBIN GLYCOSYLATED A1C: CPT

## 2024-10-15 PROCEDURE — 36415 COLL VENOUS BLD VENIPUNCTURE: CPT

## 2024-10-15 PROCEDURE — 85027 COMPLETE CBC AUTOMATED: CPT

## 2024-10-15 PROCEDURE — 80053 COMPREHEN METABOLIC PANEL: CPT

## 2024-10-25 ENCOUNTER — APPOINTMENT (OUTPATIENT)
Dept: PRIMARY CARE | Facility: CLINIC | Age: 79
End: 2024-10-25
Payer: MEDICARE

## 2024-10-25 VITALS
SYSTOLIC BLOOD PRESSURE: 130 MMHG | TEMPERATURE: 97.3 F | WEIGHT: 265 LBS | HEART RATE: 74 BPM | BODY MASS INDEX: 40.29 KG/M2 | DIASTOLIC BLOOD PRESSURE: 76 MMHG

## 2024-10-25 DIAGNOSIS — M47.819 SPINAL ARTHRITIS: ICD-10-CM

## 2024-10-25 DIAGNOSIS — Z98.890 S/P MITRAL VALVE CLIP IMPLANTATION: ICD-10-CM

## 2024-10-25 DIAGNOSIS — E78.2 MIXED HYPERLIPIDEMIA: ICD-10-CM

## 2024-10-25 DIAGNOSIS — R09.82 POST-NASAL DRIP: Primary | ICD-10-CM

## 2024-10-25 DIAGNOSIS — I10 BENIGN ESSENTIAL HYPERTENSION: ICD-10-CM

## 2024-10-25 DIAGNOSIS — I10 ESSENTIAL HYPERTENSION, BENIGN: ICD-10-CM

## 2024-10-25 DIAGNOSIS — Z95.818 S/P MITRAL VALVE CLIP IMPLANTATION: ICD-10-CM

## 2024-10-25 DIAGNOSIS — Z86.79 HISTORY OF ISCHEMIC CARDIOMYOPATHY: ICD-10-CM

## 2024-10-25 DIAGNOSIS — M54.16 LUMBAR RADICULOPATHY: ICD-10-CM

## 2024-10-25 DIAGNOSIS — I25.10 ASHD (ARTERIOSCLEROTIC HEART DISEASE): ICD-10-CM

## 2024-10-25 PROCEDURE — 1036F TOBACCO NON-USER: CPT | Performed by: INTERNAL MEDICINE

## 2024-10-25 PROCEDURE — 99214 OFFICE O/P EST MOD 30 MIN: CPT | Performed by: INTERNAL MEDICINE

## 2024-10-25 PROCEDURE — 1158F ADVNC CARE PLAN TLK DOCD: CPT | Performed by: INTERNAL MEDICINE

## 2024-10-25 PROCEDURE — 1157F ADVNC CARE PLAN IN RCRD: CPT | Performed by: INTERNAL MEDICINE

## 2024-10-25 PROCEDURE — 1159F MED LIST DOCD IN RCRD: CPT | Performed by: INTERNAL MEDICINE

## 2024-10-25 PROCEDURE — 3075F SYST BP GE 130 - 139MM HG: CPT | Performed by: INTERNAL MEDICINE

## 2024-10-25 PROCEDURE — G2211 COMPLEX E/M VISIT ADD ON: HCPCS | Performed by: INTERNAL MEDICINE

## 2024-10-25 PROCEDURE — 1160F RVW MEDS BY RX/DR IN RCRD: CPT | Performed by: INTERNAL MEDICINE

## 2024-10-25 PROCEDURE — 3078F DIAST BP <80 MM HG: CPT | Performed by: INTERNAL MEDICINE

## 2024-10-25 PROCEDURE — 1123F ACP DISCUSS/DSCN MKR DOCD: CPT | Performed by: INTERNAL MEDICINE

## 2024-10-25 RX ORDER — FLUTICASONE PROPIONATE 50 MCG
1 SPRAY, SUSPENSION (ML) NASAL DAILY
Qty: 16 G | Refills: 2 | Status: SHIPPED | OUTPATIENT
Start: 2024-10-25 | End: 2024-10-25

## 2024-10-25 RX ORDER — FLUTICASONE PROPIONATE 50 MCG
1 SPRAY, SUSPENSION (ML) NASAL DAILY
Qty: 32 G | Refills: 2 | Status: SHIPPED | OUTPATIENT
Start: 2024-10-25 | End: 2025-10-25

## 2024-10-25 RX ORDER — METHOCARBAMOL 500 MG/1
TABLET, FILM COATED ORAL
COMMUNITY
Start: 2024-09-19

## 2024-10-25 RX ORDER — GABAPENTIN 300 MG/1
300 CAPSULE ORAL NIGHTLY
Qty: 90 CAPSULE | Refills: 0 | Status: SHIPPED | OUTPATIENT
Start: 2024-10-25

## 2024-10-25 RX ORDER — FERROUS SULFATE 325(65) MG
325 TABLET ORAL
COMMUNITY

## 2024-10-25 RX ORDER — ACETAMINOPHEN 500 MG
2000 TABLET ORAL 4 TIMES DAILY
COMMUNITY

## 2024-10-25 RX ORDER — FLUTICASONE PROPIONATE 50 MCG
1 SPRAY, SUSPENSION (ML) NASAL DAILY
Qty: 16 G | Refills: 5 | Status: SHIPPED | OUTPATIENT
Start: 2024-10-25 | End: 2024-10-25

## 2024-10-25 RX ORDER — TRAMADOL HYDROCHLORIDE 50 MG/1
TABLET ORAL
COMMUNITY
Start: 2024-10-09

## 2024-10-25 RX ORDER — GABAPENTIN 100 MG/1
100 CAPSULE ORAL
COMMUNITY
Start: 2024-08-31 | End: 2024-10-25 | Stop reason: SDUPTHER

## 2024-10-25 RX ORDER — IPRATROPIUM BROMIDE 21 UG/1
SPRAY, METERED NASAL
COMMUNITY
Start: 2024-06-27

## 2024-10-25 RX ORDER — FLUOCINONIDE 0.5 MG/G
CREAM TOPICAL 2 TIMES DAILY
COMMUNITY

## 2024-10-25 RX ORDER — GABAPENTIN 100 MG/1
100 CAPSULE ORAL DAILY
Qty: 90 CAPSULE | Refills: 0 | Status: SHIPPED | OUTPATIENT
Start: 2024-10-25

## 2024-10-25 ASSESSMENT — ENCOUNTER SYMPTOMS
RESPIRATORY NEGATIVE: 1
PALPITATIONS: 0
CONSTITUTIONAL NEGATIVE: 1
ENDOCRINE COMMENTS: DM

## 2024-10-25 ASSESSMENT — PATIENT HEALTH QUESTIONNAIRE - PHQ9
2. FEELING DOWN, DEPRESSED OR HOPELESS: NOT AT ALL
1. LITTLE INTEREST OR PLEASURE IN DOING THINGS: NOT AT ALL
SUM OF ALL RESPONSES TO PHQ9 QUESTIONS 1 & 2: 0

## 2024-10-25 NOTE — PROGRESS NOTES
Subjective   Patient ID: Josue Benaviedz Jr. is a 79 y.o. male who presents for Follow-up (Labs-cough, sneezing, and speak about another referral to a different pain management).    HPI patient here for office visit blood work reviewed hemoglobin A1c is stable creatinine and GFR is pretty much at the baseline patient sees Dr. Ron.  Patient also complains of cough and sneezing and was using Afrin and we advised him not to use that he is sent in a prescription for Flonase he also wants a referral to a different pain management doctor    Review of Systems   Constitutional: Negative.    HENT: Negative.     Respiratory: Negative.     Cardiovascular:  Negative for chest pain, palpitations and leg swelling.   Endocrine:        DM   Genitourinary: Negative.    Musculoskeletal:         See HPI   All other systems reviewed and are negative.      Objective   /76   Pulse 74   Temp 36.3 °C (97.3 °F) (Temporal)   Wt 120 kg (265 lb)   BMI 40.29 kg/m²     Physical Exam  Vitals reviewed.   Constitutional:       Appearance: He is normal weight.   HENT:      Head: Normocephalic.   Neck:      Vascular: No carotid bruit.   Cardiovascular:      Rate and Rhythm: Normal rate and regular rhythm.      Heart sounds: Murmur heard.   Pulmonary:      Effort: Pulmonary effort is normal.      Breath sounds: Normal breath sounds.   Musculoskeletal:      Right lower leg: No edema.      Left lower leg: No edema.   Neurological:      General: No focal deficit present.      Mental Status: He is alert and oriented to person, place, and time.         Assessment/Plan   Problem List Items Addressed This Visit             ICD-10-CM    ASHD (arteriosclerotic heart disease) I25.10    Benign essential hypertension I10    History of ischemic cardiomyopathy Z86.79    Mixed hyperlipidemia E78.2    S/P mitral valve clip implantation Z98.890, Z95.818    Essential hypertension, benign I10     Other Visit Diagnoses         Codes    Post-nasal drip    -   Primary R09.82    Relevant Medications    fluticasone (Flonase) 50 mcg/actuation nasal spray    Spinal arthritis     M47.819    Relevant Medications    gabapentin (Neurontin) 300 mg capsule    gabapentin (Neurontin) 100 mg capsule    Other Relevant Orders    Referral to Pain Medicine    Lumbar radiculopathy     M54.16    Relevant Orders    Referral to Pain Medicine          Gabapentin for spinal arthritis lumbar radiculopathy renewed patient is on diuretic spironolactone and torsemide for CHF.  He also has history of anemia most recent CBC was normal.  Regarding his sneezing and postnasal drip we have given him Flonase prescription.  New pain management referral will be made.  Blood sugars and blood pressure is under good control we will repeat labs on him in 3 to 4 months.  He has systolic CHF ischemic cardiomyopathy history of CAD he follows up with Dr. Ricci medications reviewed.

## 2024-11-04 DIAGNOSIS — M10.9 GOUT, UNSPECIFIED CAUSE, UNSPECIFIED CHRONICITY, UNSPECIFIED SITE: ICD-10-CM

## 2024-11-04 DIAGNOSIS — K21.9 GASTROESOPHAGEAL REFLUX DISEASE, UNSPECIFIED WHETHER ESOPHAGITIS PRESENT: ICD-10-CM

## 2024-11-04 DIAGNOSIS — E78.2 MIXED HYPERLIPIDEMIA: ICD-10-CM

## 2024-11-04 DIAGNOSIS — I10 ESSENTIAL HYPERTENSION, BENIGN: ICD-10-CM

## 2024-11-05 RX ORDER — PANTOPRAZOLE SODIUM 40 MG/1
TABLET, DELAYED RELEASE ORAL
Qty: 100 TABLET | Refills: 1 | Status: SHIPPED | OUTPATIENT
Start: 2024-11-05

## 2024-11-05 RX ORDER — SIMVASTATIN 20 MG/1
20 TABLET, FILM COATED ORAL NIGHTLY
Qty: 90 TABLET | Refills: 3 | Status: SHIPPED | OUTPATIENT
Start: 2024-11-05

## 2024-11-05 RX ORDER — ALLOPURINOL 100 MG/1
200 TABLET ORAL DAILY
Qty: 200 TABLET | Refills: 1 | Status: SHIPPED | OUTPATIENT
Start: 2024-11-05

## 2024-11-05 RX ORDER — CARVEDILOL 25 MG/1
25 TABLET ORAL 2 TIMES DAILY
Qty: 180 TABLET | Refills: 3 | Status: SHIPPED | OUTPATIENT
Start: 2024-11-05

## 2024-11-05 NOTE — TELEPHONE ENCOUNTER
Received request for prescription refills for patient.   Patient follows with Dr. Ricci    Request is for coreg and zocor  Is patient currently on medication yes    Last OV 6/21/2024  Next OV 12/31/2024    Pended for signing and sent to provider

## 2024-11-05 NOTE — PREPROCEDURE INSTRUCTIONS
LOCAL PROCEDURE, NO ANESTHESIA, NO RESTRICTIONS                    NPO Instructions: NO RESTRICTIONS        Additional Instructions:

## 2024-11-13 NOTE — H&P
"History Of Present Illness  Nilo Benavidez Jr. \"Josue\" is a 79 y.o. male presenting with severe back pain related to lumbar spondylosis.  Conservative treatment failed to give the patient adequate pain relief.  No history of fall or trauma.  No fever, chills or rigors.     Past Medical History  Past Medical History:   Diagnosis Date    Anemia     Arthritis     BPH (benign prostatic hyperplasia)     Cardiomyopathy     Cataract     CHF (congestive heart failure)     CKD (chronic kidney disease)     Colon polyp     COPD (chronic obstructive pulmonary disease) (Multi)     COVID-19     VACCINATED    Does mobilize using cane     GERD (gastroesophageal reflux disease)     GI (gastrointestinal bleed)     UPPER    Gout     Hearing aid worn     Heart failure     History of blood transfusion     Hyperlipidemia     Hyperparathyroidism (Multi)     Hypertension     Hypokalemia     Insomnia     Joint pain     LBBB (left bundle branch block)     Mitral regurgitation     Myocardial infarction (Multi)     Other ventricular tachycardia     Ventricular tachycardia (paroxysmal)    Personal history of diseases of the blood and blood-forming organs and certain disorders involving the immune mechanism 01/06/2021    History of bleeding disorder    Personal history of other diseases of the circulatory system 01/06/2021    History of cardiac disorder    Personal history of other diseases of the musculoskeletal system and connective tissue 01/06/2021    History of arthritis    Personal history of other specified conditions 01/06/2021    History of chest pain    PSVT (paroxysmal supraventricular tachycardia) (CMS-HCC)     Sacroiliitis (CMS-HCC)     Sleep apnea     USES CPAP    Splenomegaly     Type 2 diabetes mellitus     Uses walker     ONLY USES FOR LONG DISTANCES    Wears dentures        Surgical History  Past Surgical History:   Procedure Laterality Date    OTHER SURGICAL HISTORY  01/06/2021    Pacemaker insertion    OTHER SURGICAL HISTORY "  01/05/2022    Colonoscopy    OTHER SURGICAL HISTORY  01/05/2022    Cataract surgery    OTHER SURGICAL HISTORY  01/05/2022    Cardioverter defibrillator insertion    OTHER SURGICAL HISTORY  01/05/2022    Median nerve neuroplasty    OTHER SURGICAL HISTORY  01/05/2022    Cardiac catheterization with stent placement    OTHER SURGICAL HISTORY  01/05/2022    Esophagogastroduodenoscopy    OTHER SURGICAL HISTORY  01/05/2022    Percutaneous transluminal coronary angioplasty    OTHER SURGICAL HISTORY      MITRAL VALVE CLIP IMPLNATATION        Social History  He reports that he quit smoking about 21 years ago. His smoking use included cigarettes. He started smoking about 64 years ago. He has a 21.5 pack-year smoking history. He has never used smokeless tobacco. He reports current alcohol use. He reports that he does not use drugs.    Family History  Family History   Problem Relation Name Age of Onset    Diabetes Mother      Valvular heart disease Mother      Hypertension Father          Allergies  Caffeine, Codeine, Lisinopril, Penicillins, and Shellfish containing products    Review of Systems  12 system symptoms have been inquired about and what was positive was placed in history of present illness     Physical Exam  Patient is alert and oriented  Bilateral breath sounds  Heart rhythmic S1-S2 no S3  Abdominal exam unremarkable for signs of peritonitis  Neurological examination within normal limits  Last Recorded Vitals  Weight 120 kg (264 lb).    Relevant Results               Assessment/Plan   Assessment & Plan      Patient with bilateral L4-5 L5-S1 lumbar spondylosis not responding to conservative treatment.  I discussed with the patient risks and benefits of lumbar facet steroid injection at the level of L4-5 and L5-S1.        I spent 20 minutes in the professional and overall care of this patient.      Naz Gold MD

## 2024-11-14 ENCOUNTER — HOSPITAL ENCOUNTER (OUTPATIENT)
Facility: HOSPITAL | Age: 79
Setting detail: OUTPATIENT SURGERY
Discharge: HOME | End: 2024-11-14
Attending: ANESTHESIOLOGY | Admitting: ANESTHESIOLOGY
Payer: MEDICARE

## 2024-11-14 ENCOUNTER — APPOINTMENT (OUTPATIENT)
Dept: RADIOLOGY | Facility: HOSPITAL | Age: 79
End: 2024-11-14
Payer: MEDICARE

## 2024-11-14 VITALS
OXYGEN SATURATION: 97 % | HEIGHT: 68 IN | DIASTOLIC BLOOD PRESSURE: 58 MMHG | TEMPERATURE: 97.3 F | WEIGHT: 263.01 LBS | SYSTOLIC BLOOD PRESSURE: 121 MMHG | RESPIRATION RATE: 18 BRPM | BODY MASS INDEX: 39.86 KG/M2 | HEART RATE: 64 BPM

## 2024-11-14 DIAGNOSIS — M47.816 SPONDYLOSIS WITHOUT MYELOPATHY OR RADICULOPATHY, LUMBAR REGION: ICD-10-CM

## 2024-11-14 PROCEDURE — 2550000001 HC RX 255 CONTRASTS: Performed by: ANESTHESIOLOGY

## 2024-11-14 PROCEDURE — 2500000004 HC RX 250 GENERAL PHARMACY W/ HCPCS (ALT 636 FOR OP/ED): Performed by: ANESTHESIOLOGY

## 2024-11-14 PROCEDURE — 7100000010 HC PHASE TWO TIME - EACH INCREMENTAL 1 MINUTE: Performed by: ANESTHESIOLOGY

## 2024-11-14 PROCEDURE — 3600000002 HC OR TIME - INITIAL BASE CHARGE - PROCEDURE LEVEL TWO: Performed by: ANESTHESIOLOGY

## 2024-11-14 PROCEDURE — 7100000009 HC PHASE TWO TIME - INITIAL BASE CHARGE: Performed by: ANESTHESIOLOGY

## 2024-11-14 PROCEDURE — 3600000007 HC OR TIME - EACH INCREMENTAL 1 MINUTE - PROCEDURE LEVEL TWO: Performed by: ANESTHESIOLOGY

## 2024-11-14 RX ORDER — DEXAMETHASONE SODIUM PHOSPHATE 10 MG/ML
INJECTION INTRAMUSCULAR; INTRAVENOUS AS NEEDED
Status: DISCONTINUED | OUTPATIENT
Start: 2024-11-14 | End: 2024-11-14 | Stop reason: HOSPADM

## 2024-11-14 RX ORDER — ROPIVACAINE HYDROCHLORIDE 5 MG/ML
INJECTION, SOLUTION EPIDURAL; INFILTRATION; PERINEURAL AS NEEDED
Status: DISCONTINUED | OUTPATIENT
Start: 2024-11-14 | End: 2024-11-14 | Stop reason: HOSPADM

## 2024-11-14 RX ORDER — LIDOCAINE HYDROCHLORIDE 10 MG/ML
INJECTION, SOLUTION INFILTRATION; PERINEURAL AS NEEDED
Status: DISCONTINUED | OUTPATIENT
Start: 2024-11-14 | End: 2024-11-14 | Stop reason: HOSPADM

## 2024-11-14 ASSESSMENT — PAIN SCALES - GENERAL
PAINLEVEL_OUTOF10: 0 - NO PAIN
PAINLEVEL_OUTOF10: 0 - NO PAIN

## 2024-11-14 ASSESSMENT — COLUMBIA-SUICIDE SEVERITY RATING SCALE - C-SSRS
1. IN THE PAST MONTH, HAVE YOU WISHED YOU WERE DEAD OR WISHED YOU COULD GO TO SLEEP AND NOT WAKE UP?: NO
6. HAVE YOU EVER DONE ANYTHING, STARTED TO DO ANYTHING, OR PREPARED TO DO ANYTHING TO END YOUR LIFE?: NO
2. HAVE YOU ACTUALLY HAD ANY THOUGHTS OF KILLING YOURSELF?: NO

## 2024-11-14 ASSESSMENT — PAIN - FUNCTIONAL ASSESSMENT
PAIN_FUNCTIONAL_ASSESSMENT: 0-10
PAIN_FUNCTIONAL_ASSESSMENT: 0-10

## 2024-11-14 NOTE — OP NOTE
"BILATERAL LUMBAR FACET INJECTION AT L4-5 AND L5-S1 WITH FLUOROSCOPY (B), Operative Images Operative Note     Date: 2024  OR Location: [facility]    Name: Nilo Benavidez Jr. \"Josue\", : 1945, Age: 79 y.o., MRN: [id number], Sex: male    Diagnosis  Pre-op Diagnosis      * Spondylosis without myelopathy or radiculopathy, lumbar region [M47.816] Post-op Diagnosis     * Spondylosis without myelopathy or radiculopathy, lumbar region [M47.816]     Procedures  BILATERAL LUMBAR FACET INJECTION AT L4-5 AND L5-S1 WITH FLUOROSCOPY  76932 - NM NJX DX/THER AGT PVRT FACET JT LMBR/SAC 1 LEVEL    BILATERAL LUMBAR FACET INJECTION AT L4-5 AND L5-S1 WITH FLUOROSCOPY  96532 - NM NJX DX/THER AGT PVRT FACET JT LMBR/SAC 2ND LEVEL    BILATERAL LUMBAR FACET INJECTION AT L4-5 AND L5-S1 WITH FLUOROSCOPY  15268 - NM NJX DX/THER AGT PVRT FACET JT LMBR/SAC 3+ LEVEL    BILATERAL LUMBAR FACET INJECTION AT L4-5 AND L5-S1 WITH FLUOROSCOPY  50742 - CHG FLUOR NEEDLE/CATH SPINE/PARASPINAL DX/THER ADDON    Operative Images      Surgeons      * Naz Gold - Primary    Resident/Fellow/Other Assistant:  Surgeons and Role:  * No surgeons found with a matching role *    Staff:   Shani: Andrei  Circulator: Micaela  Scrub Person: Micaela    Anesthesia Staff: No anesthesia staff entered.    Procedure Summary  Anesthesia: Anesthesia type not filed in the log.  ASA: ASA status not filed in the log.  Estimated Blood Loss: Less than 2 cc mL  Intra-op Medications:   Administrations occurring from 0815 to 0900 on 24:   Medication Name Total Dose   iohexol (OMNIPaque) 300 mg iodine/mL solution 4 mL   ropivacaine (Naropin) 5 mg/mL (0.5 %) injection 7 mL   dexAMETHasone (Decadron) injection 10 mg   lidocaine (Xylocaine) 10 mg/mL (1 %) injection 5 mL              Anesthesia Record               Intraprocedure I/O Totals       None           Specimen: No specimens collected              Drains and/or Catheters: * None in log *    Tourniquet Times:     " "    Implants:     Findings: Facet joints with Nahun dog appearance under x-ray    Indications: Nilo Benavidez Jr. \"Josue\" is an 79 y.o. male who is having surgery for Spondylosis without myelopathy or radiculopathy, lumbar region [M47.816].  Conservative treatment failed to give the patient adequate pain relief.  I discussed with the patient risks and benefit of the procedure patient agreed to proceed.    The patient was seen in the preoperative area. The risks, benefits, complications, treatment options, non-operative alternatives, expected recovery and outcomes were discussed with the patient. The possibilities of reaction to medication, pulmonary aspiration, injury to surrounding structures, bleeding, recurrent infection, the need for additional procedures, failure to diagnose a condition, and creating a complication requiring transfusion or operation were discussed with the patient. The patient concurred with the proposed plan, giving informed consent.  The site of surgery was properly noted/marked if necessary per policy. The patient has been actively warmed in preoperative area. Preoperative antibiotics are not indicated. Venous thrombosis prophylaxis are not indicated.    Procedure Details:     The patient was identified and H&P were updated.  The patient was taken to the operating room placed on prone position.  All pressure points were protected.  Basic ASA monitors were applied.  Timeout was obtained.  The lower back was prepped using DuraPrep and draped in regular fashion.  Under fluoroscopy the left L5-S1 and L4-5 facet joints were identified in the oblique view.  The site of entry was identified.  The skin and subcutaneous tissue of the site of entry were infiltrated using 1% lidocaine.  Under fluoroscopic guidance and simultaneously to reduce the amount of radiation exposure, 2 number 22-gauge 5 inch Quincke needles were advanced at until they reached at the proper facet joints.  Aspiration was " negative for blood or CSF.  Injection of Omnipaque shows no intravascular spread.  Injection of 1.5 cc of 0.5 ropivacaine mixed it was 2.5 mg of dexamethasone preservative free was injected at each level.  The procedure was repeated on the right side.  Band-Aids were applied to the for site of entry.    Complications:  None; patient tolerated the procedure well.    Disposition:  Phase 2 recovery  Condition: stable                 Additional Details: Number of injections were 4    Attending Attestation: I performed the procedure.    Naz Gold  Phone Number: [phone]

## 2024-11-27 ENCOUNTER — SPECIALTY PHARMACY (OUTPATIENT)
Dept: PHARMACY | Facility: CLINIC | Age: 79
End: 2024-11-27

## 2024-12-06 ENCOUNTER — HOSPITAL ENCOUNTER (OUTPATIENT)
Dept: CARDIOLOGY | Facility: HOSPITAL | Age: 79
Discharge: HOME | End: 2024-12-06
Payer: MEDICARE

## 2024-12-06 ENCOUNTER — APPOINTMENT (OUTPATIENT)
Dept: CARDIOLOGY | Facility: CLINIC | Age: 79
End: 2024-12-06
Payer: MEDICARE

## 2024-12-06 VITALS
WEIGHT: 260 LBS | DIASTOLIC BLOOD PRESSURE: 58 MMHG | BODY MASS INDEX: 39.4 KG/M2 | SYSTOLIC BLOOD PRESSURE: 118 MMHG | HEIGHT: 68 IN | HEART RATE: 72 BPM

## 2024-12-06 DIAGNOSIS — R07.9 CHEST PAIN, UNSPECIFIED TYPE: ICD-10-CM

## 2024-12-06 DIAGNOSIS — Z95.810 ICD (IMPLANTABLE CARDIOVERTER-DEFIBRILLATOR), BIVENTRICULAR, IN SITU: Primary | ICD-10-CM

## 2024-12-06 DIAGNOSIS — E78.2 MIXED HYPERLIPIDEMIA: ICD-10-CM

## 2024-12-06 DIAGNOSIS — Z95.810 ICD (IMPLANTABLE CARDIOVERTER-DEFIBRILLATOR), BIVENTRICULAR, IN SITU: ICD-10-CM

## 2024-12-06 DIAGNOSIS — I44.7 LBBB (LEFT BUNDLE BRANCH BLOCK): ICD-10-CM

## 2024-12-06 DIAGNOSIS — I50.22 CHRONIC SYSTOLIC CONGESTIVE HEART FAILURE: ICD-10-CM

## 2024-12-06 DIAGNOSIS — I47.10 PAROXYSMAL SVT (SUPRAVENTRICULAR TACHYCARDIA) (CMS-HCC): ICD-10-CM

## 2024-12-06 DIAGNOSIS — G47.30 SLEEP APNEA, UNSPECIFIED TYPE: ICD-10-CM

## 2024-12-06 DIAGNOSIS — I10 ESSENTIAL HYPERTENSION, BENIGN: ICD-10-CM

## 2024-12-06 DIAGNOSIS — Z87.891 FORMER CIGARETTE SMOKER: ICD-10-CM

## 2024-12-06 PROCEDURE — 3074F SYST BP LT 130 MM HG: CPT | Performed by: INTERNAL MEDICINE

## 2024-12-06 PROCEDURE — 1123F ACP DISCUSS/DSCN MKR DOCD: CPT | Performed by: INTERNAL MEDICINE

## 2024-12-06 PROCEDURE — 1159F MED LIST DOCD IN RCRD: CPT | Performed by: INTERNAL MEDICINE

## 2024-12-06 PROCEDURE — 1036F TOBACCO NON-USER: CPT | Performed by: INTERNAL MEDICINE

## 2024-12-06 PROCEDURE — 1157F ADVNC CARE PLAN IN RCRD: CPT | Performed by: INTERNAL MEDICINE

## 2024-12-06 PROCEDURE — 93000 ELECTROCARDIOGRAM COMPLETE: CPT | Mod: DISTINCT PROCEDURAL SERVICE | Performed by: INTERNAL MEDICINE

## 2024-12-06 PROCEDURE — 99214 OFFICE O/P EST MOD 30 MIN: CPT | Performed by: INTERNAL MEDICINE

## 2024-12-06 PROCEDURE — 93284 PRGRMG EVAL IMPLANTABLE DFB: CPT

## 2024-12-06 PROCEDURE — 3078F DIAST BP <80 MM HG: CPT | Performed by: INTERNAL MEDICINE

## 2024-12-06 RX ORDER — NITROGLYCERIN 0.4 MG/1
TABLET SUBLINGUAL
Qty: 25 TABLET | Refills: 3 | Status: SHIPPED | OUTPATIENT
Start: 2024-12-06

## 2024-12-06 ASSESSMENT — ENCOUNTER SYMPTOMS
CARDIOVASCULAR NEGATIVE: 1
CONSTITUTIONAL NEGATIVE: 1
RESPIRATORY NEGATIVE: 1
MUSCULOSKELETAL NEGATIVE: 1
NEUROLOGICAL NEGATIVE: 1

## 2024-12-06 NOTE — PROGRESS NOTES
"Chief Complaint:   Follow-up (6 month follow up with [facility])     History Of Present Illness:    Josue Benavidez Jr. is a 79 y.o. male presenting with follow up.    He is accompanied by a friend.    Patient denies any arrhythmia symptoms of palpitation, lightheadedness, near syncope, or syncope.       Last Recorded Vitals:  Vitals:    12/06/24 1425   BP: 118/58   BP Location: Left arm   Patient Position: Sitting   Pulse: 72   Weight: 118 kg (260 lb)   Height: 1.727 m (5' 8\")       Past Medical History:  He has a past medical history of Anemia, Arthritis, BPH (benign prostatic hyperplasia), Cardiomyopathy, Cataract, CHF (congestive heart failure), CKD (chronic kidney disease), Colon polyp, COPD (chronic obstructive pulmonary disease) (Multi), COVID-19, Does mobilize using cane, GERD (gastroesophageal reflux disease), GI (gastrointestinal bleed), Gout, Hearing aid worn, Heart failure, History of blood transfusion, Hyperlipidemia, Hyperparathyroidism (Multi), Hypertension, Hypokalemia, Insomnia, Joint pain, LBBB (left bundle branch block), Mitral regurgitation, Myocardial infarction (Multi), Other ventricular tachycardia, Personal history of diseases of the blood and blood-forming organs and certain disorders involving the immune mechanism (01/06/2021), Personal history of other diseases of the circulatory system (01/06/2021), Personal history of other diseases of the musculoskeletal system and connective tissue (01/06/2021), Personal history of other specified conditions (01/06/2021), PSVT (paroxysmal supraventricular tachycardia) (CMS-HCC), Sacroiliitis (CMS-HCC), Sleep apnea, Splenomegaly, Type 2 diabetes mellitus, Uses walker, and Wears dentures.    Past Surgical History:  See list    Social History:  He reports that he quit smoking about 21 years ago. His smoking use included cigarettes. He started smoking about 64 years ago. He has a 21.5 pack-year smoking history. He has never used smokeless tobacco. He reports " current alcohol use. He reports that he does not use drugs.    Family History:  Family History   Problem Relation Name Age of Onset    Diabetes Mother      Valvular heart disease Mother      Hypertension Father          Allergies:  Caffeine, Codeine, Lisinopril, Penicillins, and Shellfish containing products    Outpatient Medications:  Current Outpatient Medications   Medication Instructions    acetaminophen (TYLENOL) 500 mg, Every 6 hours PRN    albuterol 90 mcg/actuation inhaler 2 puffs, Every 4 hours PRN    allopurinol (ZYLOPRIM) 200 mg, oral, Daily    ascorbic acid (VITAMIN C) 500 mg, Daily    blood sugar diagnostic (Blood Glucose Test) strip Test twice daily.    blood sugar diagnostic (True Metrix Glucose Test Strip) strip Use 1 strip twice daily.    carvedilol (COREG) 25 mg, oral, 2 times daily    cholecalciferol (VITAMIN D3) 2,000 Units, 4 times daily    ferrous sulfate (325 mg ferrous sulfate) 325 mg, Daily with breakfast    fluocinonide (Lidex) 0.05 % cream As needed    fluticasone (Flonase) 50 mcg/actuation nasal spray 1 spray, Each Nostril, Daily, Shake gently. Before first use, prime pump. After use, clean tip and replace cap.    gabapentin (NEURONTIN) 300 mg, oral, Nightly    gabapentin (NEURONTIN) 100 mg, oral, Daily    glimepiride (AMARYL) 4 mg, oral, 2 times daily    ipratropium-albuteroL (Duo-Neb) 0.5-2.5 mg/3 mL nebulizer solution 3 mL, nebulization, Every 6 hours RT    Jardiance 10 mg, oral, Daily    lancets misc Use to test once daily as directed.    losartan (COZAAR) 25 mg, oral, Daily    magnesium oxide (MAG-OX) 400 mg, Daily    methocarbamol (Robaxin) 500 mg tablet     metOLazone (ZAROXOLYN) 2.5 mg, oral, As needed    montelukast (SINGULAIR) 10 mg, Nightly    nitroglycerin (Nitrostat) 0.4 mg SL tablet PLACE 1 TABLET UNDER THE TONGUE EVERY 5 MINUTES FOR UP TO 3 DOSES AS NEEDED FOR CHEST PAIN. CALL 911 IF PAIN PERSISTS.    NON FORMULARY Vitamin D 50 MCG (2000 UT) Oral Tablet; Take PO as  directed.    pantoprazole (ProtoNix) 40 mg EC tablet TAKE 1 TABLET BY MOUTH ONCE  DAILY    simvastatin (ZOCOR) 20 mg, oral, Nightly    spironolactone (ALDACTONE) 25 mg, oral, Daily    torsemide (Demadex) 100 mg tablet oral, 2 times daily    traMADol (Ultram) 50 mg tablet      Review of Systems   Constitutional: Negative.   HENT: Negative.     Cardiovascular: Negative.    Respiratory: Negative.     Musculoskeletal: Negative.    Neurological: Negative.    All other systems reviewed and are negative.      Physical Exam:  Physical Exam  Vitals and nursing note reviewed.   Constitutional:       Appearance: Normal appearance.   HENT:      Head: Normocephalic and atraumatic.   Eyes:      Extraocular Movements: Extraocular movements intact.      Pupils: Pupils are equal, round, and reactive to light.   Cardiovascular:      Rate and Rhythm: Normal rate and regular rhythm.      Pulses: Normal pulses.      Comments: Left sided device intact  Pulmonary:      Effort: Pulmonary effort is normal.      Breath sounds: Normal breath sounds.   Musculoskeletal:         General: Normal range of motion.      Cervical back: Normal range of motion.      Right lower le+ Edema present.      Left lower leg: Edema present.   Skin:     General: Skin is warm and dry.   Neurological:      General: No focal deficit present.      Mental Status: He is alert and oriented to person, place, and time.            Last Labs:  CBC -  Lab Results   Component Value Date    WBC 9.3 10/15/2024    HGB 15.0 10/15/2024    HCT 45.8 10/15/2024    MCV 97 10/15/2024     10/15/2024       CMP -  Lab Results   Component Value Date    CALCIUM 9.5 10/15/2024    PHOS 2.3 (L) 2024    PROT 6.9 10/15/2024    ALBUMIN 4.2 10/15/2024    AST 22 10/15/2024    ALT 18 10/15/2024    ALKPHOS 73 10/15/2024    BILITOT 0.5 10/15/2024       LIPID PANEL -   Lab Results   Component Value Date    CHOL 112 2022    TRIG 136 2022    HDL 31.6 (A) 2022    CHHDL  3.5 12/12/2022    LDLF 53 12/12/2022    VLDL 27 12/12/2022    NHDL 91 11/30/2020       RENAL FUNCTION PANEL -   Lab Results   Component Value Date    GLUCOSE 194 (H) 10/15/2024     (L) 10/15/2024    K 4.0 10/15/2024    CL 91 (L) 10/15/2024    CO2 31 10/15/2024    ANIONGAP 15 10/15/2024    BUN 52 (H) 10/15/2024    CREATININE 1.77 (H) 10/15/2024    GFRMALE 39 (A) 08/09/2023    CALCIUM 9.5 10/15/2024    PHOS 2.3 (L) 06/21/2024    ALBUMIN 4.2 10/15/2024        Lab Results   Component Value Date     (H) 05/29/2024    HGBA1C 6.6 (H) 10/15/2024       Last Cardiology Tests:  ECG:  ECG 12 lead (Clinic Performed) 06/04/2024    Today. Appropriate pacing. Normal axis. Paced  ms.  PVCs    Device check today.  Medtronic DTPA 2 D1 ICD.  Estimate longevity device over 1 year.  94% BiV pacing.  0 A-fib.  1 NSVT  Echo:  Transthoracic Echo (TTE) Complete 06/07/2024        Lab review: I have personally reviewed the laboratory result(s) see above    Assessment/Plan   Diagnoses and all orders for this visit:  ICD (implantable cardioverter-defibrillator), biventricular, in situ  Paroxysmal SVT (supraventricular tachycardia) (CMS-[facility])  Chronic systolic congestive heart failure  Sleep apnea, unspecified type  Essential hypertension, benign  LBBB (left bundle branch block)  Mixed hyperlipidemia  BMI 39.0-39.9,adult  Former cigarette smoker        Anne Marie Fitzgerald LPN      Chronic systolic heart failure. Refractory heart failure and LBBB, s/p upgrade to biventricular device in 2008 and generator change in 2016. Stable.   Sinus node dysfunction Chronotropic incompetence.  Previously adjusted sensor by walk test.   Medtronic [id number] biventricular device. On field alert.  Previously adjusted vectors.  Discussed mechanical complication of device. Reviewed device check with patient and friend.  Ordered device checks.  Will continue to alternate remote checks with device clinic paired with EP office visit.  Discussed  intensifying frequency of follow-up when device approaches PAWAN.  Also discussed when MI occurs.  Process for generator change.  History of new RV lead due to RV fracture. Chronic. Stable with prior lead capped.  Recurrent ventricular tachycardia. Chronic. Stable. Reviewed medications. Continue meds.  Refills discussed.  Ischemic cardiomyopathy. Chronic. Stable. LVEF 30% by echo 10/18. Lexiscan LVEF 25%. Presence of scar and substrate for VT. Medications reviewed.  Asymptomatic on medications.  Refills discussed.  New [place] Heart Association 2C heart failure. Chronic. Stable. Chronic systolic and diastolic heart failure since 2003. Reviewed medications. Continue same medications.  Refills discussed  PSVT. Chronic. Stable. No recent episodes. Reviewed medications. Continue meds.  Refills discussed.  Coronary artery disease. Stable, chronic. Remote percutaneous coronary intervention and stents. Remote myocardial infarction. Asymptomatic. Reviewed medications. Continue medications.  Refills discussed.  Hypertension, benign, chronic, controlled. Stable. Reviewed medications. Continue medications.  Discussed refills.  Valvular heart disease. Chronic. Stable.  Follows with cardiology.  Pancreatic?  Cyst.  Undergoing surveillance scans.  Incidental adrenal gland mass noted on CT scan.  Follows with PCP.  History of mild hypokalemia.   CKD stage III. Chronic. Stable. Reviewed medications.  Obstructive sleep apnea, chronic. Stable. On CPAP. Discussed again the association of sleep apnea and arrhythmias.    Diabetes mellitus. Chronic. Stable.  Chronic back, right lower leg pain and knee swelling, stable. Severe osteoarthritis. Ambulates with cane. Follows with pain clinic.  Status post injections.  Colonic polyps, Chronic. Stable. Reviewed medications. Tolerates aspirin.   Hyperlipidemia, chronic. Stable. Reviewed medications. On statin.  Overweight.    AHA recommendations for exercise, diet, and behavioral modification  reviewed with pt.     Counseling over 50% visit performed. The patient, friend, and I discussed the mechanism of arrhythmia, ECG, field alert device, device status, what occurs when device is at [facility], eventual generator change which the patient opts to have with me at [place] In the future when it is time for generator change, indications for and types of medications, discussion if and what medication refills needed, treatment options, risks, benefits, and imponderables. American Heart Association lifestyle changes and behavioral modification discussed. All questions answered in detail. Patient appreciative of care.

## 2024-12-06 NOTE — PATIENT INSTRUCTIONS
Remote checks at 3 and 9 months  Follow up office visit in 6 months and in clinic device check  Follow up office visit in 1 year and in clinic device check  Continue same medications/treatment.  Patient educated on proper medication use.  Patient educated on risk factor modification.  Please bring any lab results from other providers / physicians to your next appointment.    Please bring all medicines, vitamins and herbal supplements with you when you come to the office.    Prescriptions will not be filled unless you are compliant with your follow up appointments or have a follow up  appointment scheduled as per instruction of your physician.  Refills should be requested at the time of  Your visit.    Anne Marie HURT LPN, renan scribing for and in the presence of Dr. Kristi Hsieh MD, FACC, FACP, FHRS

## 2024-12-10 ENCOUNTER — HOSPITAL ENCOUNTER (OUTPATIENT)
Dept: CARDIOLOGY | Facility: CLINIC | Age: 79
Discharge: HOME | End: 2024-12-10
Payer: MEDICARE

## 2024-12-10 DIAGNOSIS — I50.20 SYSTOLIC HEART FAILURE DUE TO VALVULAR DISEASE, UNSPECIFIED HEART FAILURE CHRONICITY: ICD-10-CM

## 2024-12-10 DIAGNOSIS — I34.0 NONRHEUMATIC MITRAL VALVE REGURGITATION: ICD-10-CM

## 2024-12-10 DIAGNOSIS — Z95.810 ICD (IMPLANTABLE CARDIOVERTER-DEFIBRILLATOR), BIVENTRICULAR, IN SITU: Primary | ICD-10-CM

## 2024-12-10 DIAGNOSIS — I50.32 CHRONIC DIASTOLIC HEART FAILURE: ICD-10-CM

## 2024-12-10 DIAGNOSIS — I38 SYSTOLIC HEART FAILURE DUE TO VALVULAR DISEASE, UNSPECIFIED HEART FAILURE CHRONICITY: ICD-10-CM

## 2024-12-10 LAB
AORTIC VALVE MEAN GRADIENT: 12 MMHG
AORTIC VALVE PEAK VELOCITY: 2.09 M/S
AV PEAK GRADIENT: 17 MMHG
AVA (PEAK VEL): 1.77 CM2
AVA (VTI): 1.55 CM2
EJECTION FRACTION APICAL 4 CHAMBER: 35.3
EJECTION FRACTION: 30 %
LEFT VENTRICLE INTERNAL DIMENSION DIASTOLE: 6.4 CM (ref 3.5–6)
LEFT VENTRICULAR OUTFLOW TRACT DIAMETER: 2 CM
LV EJECTION FRACTION BIPLANE: 36 %
MITRAL VALVE E/A RATIO: 1.05
MITRAL VALVE E/E' RATIO: 26.3
RIGHT VENTRICLE PEAK SYSTOLIC PRESSURE: 49.7 MMHG

## 2024-12-10 PROCEDURE — C8929 TTE W OR WO FOL WCON,DOPPLER: HCPCS

## 2024-12-10 PROCEDURE — 2500000004 HC RX 250 GENERAL PHARMACY W/ HCPCS (ALT 636 FOR OP/ED): Performed by: INTERNAL MEDICINE

## 2024-12-10 PROCEDURE — 93306 TTE W/DOPPLER COMPLETE: CPT | Performed by: INTERNAL MEDICINE

## 2024-12-13 DIAGNOSIS — E11.69 TYPE 2 DIABETES MELLITUS WITH OTHER SPECIFIED COMPLICATION, WITHOUT LONG-TERM CURRENT USE OF INSULIN: ICD-10-CM

## 2024-12-16 RX ORDER — GLIMEPIRIDE 4 MG/1
4 TABLET ORAL 2 TIMES DAILY
Qty: 200 TABLET | Refills: 1 | Status: SHIPPED | OUTPATIENT
Start: 2024-12-16

## 2024-12-17 PROCEDURE — RXMED WILLOW AMBULATORY MEDICATION CHARGE

## 2024-12-19 ENCOUNTER — PHARMACY VISIT (OUTPATIENT)
Dept: PHARMACY | Facility: CLINIC | Age: 79
End: 2024-12-19
Payer: COMMERCIAL

## 2024-12-19 DIAGNOSIS — E13.69 OTHER SPECIFIED DIABETES MELLITUS WITH OTHER SPECIFIED COMPLICATION, UNSPECIFIED WHETHER LONG TERM INSULIN USE (MULTI): ICD-10-CM

## 2024-12-19 RX ORDER — CALCIUM CITRATE/VITAMIN D3 200MG-6.25
TABLET ORAL
Qty: 200 EACH | Refills: 1 | Status: SHIPPED | OUTPATIENT
Start: 2024-12-19

## 2024-12-20 ENCOUNTER — APPOINTMENT (OUTPATIENT)
Dept: CARDIOLOGY | Facility: CLINIC | Age: 79
End: 2024-12-20
Payer: MEDICARE

## 2024-12-23 ENCOUNTER — APPOINTMENT (OUTPATIENT)
Dept: OTOLARYNGOLOGY | Facility: CLINIC | Age: 79
End: 2024-12-23
Payer: MEDICARE

## 2024-12-23 DIAGNOSIS — H60.8X3 CHRONIC ECZEMATOID OTITIS EXTERNA OF BOTH EARS: Primary | ICD-10-CM

## 2024-12-23 PROCEDURE — 99203 OFFICE O/P NEW LOW 30 MIN: CPT | Performed by: OTOLARYNGOLOGY

## 2024-12-23 PROCEDURE — 1157F ADVNC CARE PLAN IN RCRD: CPT | Performed by: OTOLARYNGOLOGY

## 2024-12-23 PROCEDURE — 1123F ACP DISCUSS/DSCN MKR DOCD: CPT | Performed by: OTOLARYNGOLOGY

## 2024-12-23 PROCEDURE — 1160F RVW MEDS BY RX/DR IN RCRD: CPT | Performed by: OTOLARYNGOLOGY

## 2024-12-23 PROCEDURE — 1159F MED LIST DOCD IN RCRD: CPT | Performed by: OTOLARYNGOLOGY

## 2024-12-23 RX ORDER — MOMETASONE FUROATE 1 MG/G
CREAM TOPICAL 2 TIMES DAILY
Qty: 15 G | Refills: 2 | Status: SHIPPED | OUTPATIENT
Start: 2024-12-23 | End: 2024-12-30

## 2024-12-23 ASSESSMENT — ENCOUNTER SYMPTOMS
CONSTITUTIONAL NEGATIVE: 1
CARDIOVASCULAR NEGATIVE: 1
NEUROLOGICAL NEGATIVE: 1
RESPIRATORY NEGATIVE: 1

## 2024-12-23 NOTE — PROGRESS NOTES
"Subjective   Patient ID: Nilo Benavidez Jr. \"Josue\" is a 79 y.o. male who presents for FLUID IN EARS and Hearing Loss.    HPI  Patient is a 79-year-old male who presents today for evaluation of his ears and hearing.  This patient has had a history of significant loss and was noted to have some evident recent drainage from the ears.  They seem to smolder a little bit.  He denies any mendy pain.  He does wear bilateral aids.  Remaining ENT inquiry is otherwise grossly clear.  No specific alleviating/aggravating factors.    Review of Systems   Constitutional: Negative.    HENT: Negative.     Respiratory: Negative.     Cardiovascular: Negative.    Neurological: Negative.        Physical Exam    General appearance: No acute distress. Normal facies. Symmetric facial movement. No gross lesions of the face are noted.  Ear exam is noted for bilateral chronic otitis externa eczema type variant.  Ears are debrided appropriately.  Medial canal drum and middle ear space all of very good bilaterally.  Nasal exam is clear anteriorly. The septum is relatively straight and the nasal mucosa is grossly unremarkable without evidence of any worrisome infection or polyp or mass. The oral cavity and oropharynx are unremarkable. There is no evidence of any gross lesion or mucosal irregularity throughout the structures. The neck is negative for mass or lymphadenopathy. The trachea and parotid region are clear free of obvious mass or tumor. Facial structures are grossly otherwise unremarkable as well.      Assessment/Plan     79-year-old male with evidence of chronic otitis externa eczema type variant as noted.  Start Elocon cream with appropriate instruction prescription provided.  Update me accordingly as needed.  All questions were answered in this regard accordingly.  "

## 2024-12-31 ENCOUNTER — APPOINTMENT (OUTPATIENT)
Dept: CARDIOLOGY | Facility: CLINIC | Age: 79
End: 2024-12-31
Payer: MEDICARE

## 2024-12-31 VITALS
HEIGHT: 68 IN | SYSTOLIC BLOOD PRESSURE: 104 MMHG | BODY MASS INDEX: 40.47 KG/M2 | WEIGHT: 267 LBS | DIASTOLIC BLOOD PRESSURE: 52 MMHG | HEART RATE: 60 BPM

## 2024-12-31 DIAGNOSIS — I10 BENIGN ESSENTIAL HYPERTENSION: ICD-10-CM

## 2024-12-31 DIAGNOSIS — I34.0 MITRAL VALVE INSUFFICIENCY, UNSPECIFIED ETIOLOGY: ICD-10-CM

## 2024-12-31 DIAGNOSIS — Z98.890 S/P MITRAL VALVE CLIP IMPLANTATION: ICD-10-CM

## 2024-12-31 DIAGNOSIS — I10 ESSENTIAL HYPERTENSION, BENIGN: ICD-10-CM

## 2024-12-31 DIAGNOSIS — I50.22 CHRONIC SYSTOLIC CONGESTIVE HEART FAILURE: ICD-10-CM

## 2024-12-31 DIAGNOSIS — Z87.891 FORMER CIGARETTE SMOKER: ICD-10-CM

## 2024-12-31 DIAGNOSIS — E11.21 DIABETES MELLITUS WITH KIDNEY DISEASE (MULTI): ICD-10-CM

## 2024-12-31 DIAGNOSIS — I65.23 BILATERAL CAROTID ARTERY STENOSIS: ICD-10-CM

## 2024-12-31 DIAGNOSIS — I25.10 ASHD (ARTERIOSCLEROTIC HEART DISEASE): ICD-10-CM

## 2024-12-31 DIAGNOSIS — Z95.818 S/P MITRAL VALVE CLIP IMPLANTATION: ICD-10-CM

## 2024-12-31 DIAGNOSIS — N18.32 STAGE 3B CHRONIC KIDNEY DISEASE (MULTI): ICD-10-CM

## 2024-12-31 DIAGNOSIS — D50.9 IRON DEFICIENCY ANEMIA, UNSPECIFIED IRON DEFICIENCY ANEMIA TYPE: ICD-10-CM

## 2024-12-31 DIAGNOSIS — E78.2 MIXED HYPERLIPIDEMIA: ICD-10-CM

## 2024-12-31 DIAGNOSIS — I25.2 PAST MYOCARDIAL INFARCTION: ICD-10-CM

## 2024-12-31 DIAGNOSIS — Z95.810 ICD (IMPLANTABLE CARDIOVERTER-DEFIBRILLATOR), BIVENTRICULAR, IN SITU: ICD-10-CM

## 2024-12-31 DIAGNOSIS — J42 CHRONIC BRONCHITIS, UNSPECIFIED CHRONIC BRONCHITIS TYPE (MULTI): ICD-10-CM

## 2024-12-31 DIAGNOSIS — I50.32 CHRONIC DIASTOLIC HEART FAILURE: ICD-10-CM

## 2024-12-31 DIAGNOSIS — Z86.79 HISTORY OF ISCHEMIC CARDIOMYOPATHY: ICD-10-CM

## 2024-12-31 PROBLEM — I50.9 HEART FAILURE DUE TO VALVULAR DISEASE: Status: RESOLVED | Noted: 2023-02-26 | Resolved: 2024-12-31

## 2024-12-31 PROBLEM — I50.9 CHF (NYHA CLASS II, ACC/AHA STAGE C) (MULTI): Status: RESOLVED | Noted: 2023-02-26 | Resolved: 2024-12-31

## 2024-12-31 PROBLEM — I38 HEART FAILURE DUE TO VALVULAR DISEASE: Status: RESOLVED | Noted: 2023-02-26 | Resolved: 2024-12-31

## 2024-12-31 PROBLEM — E66.01 MORBID (SEVERE) OBESITY DUE TO EXCESS CALORIES (MULTI): Status: RESOLVED | Noted: 2024-03-19 | Resolved: 2024-12-31

## 2024-12-31 PROCEDURE — 1159F MED LIST DOCD IN RCRD: CPT | Performed by: INTERNAL MEDICINE

## 2024-12-31 PROCEDURE — 3074F SYST BP LT 130 MM HG: CPT | Performed by: INTERNAL MEDICINE

## 2024-12-31 PROCEDURE — 1123F ACP DISCUSS/DSCN MKR DOCD: CPT | Performed by: INTERNAL MEDICINE

## 2024-12-31 PROCEDURE — 1036F TOBACCO NON-USER: CPT | Performed by: INTERNAL MEDICINE

## 2024-12-31 PROCEDURE — 3078F DIAST BP <80 MM HG: CPT | Performed by: INTERNAL MEDICINE

## 2024-12-31 PROCEDURE — 1157F ADVNC CARE PLAN IN RCRD: CPT | Performed by: INTERNAL MEDICINE

## 2024-12-31 PROCEDURE — 99214 OFFICE O/P EST MOD 30 MIN: CPT | Performed by: INTERNAL MEDICINE

## 2024-12-31 NOTE — PATIENT INSTRUCTIONS
Patient to follow up in 6 months with Dr. Bhanu Ricci MD [facility]     No changes today.   Continue to weigh self daily and keep log for measure.   Any gain over 3 pounds in 24 hours is concerning for fluid overload- call us.     Continue same medications and treatments.   Patient educated on proper medication use.   Patient educated on risk factor modification.   Please bring any lab results from other providers / physicians to your next appointment.     Please bring all medicines, vitamins, and herbal supplements with you when you come to the office.     Prescriptions will not be filled unless you are compliant with your follow up appointments or have a follow up appointment scheduled as per instruction of your physician. Refills should be requested at the time of your visit.    ITyler RN am scribing for and in the presence of Dr. Bhanu Ricci MD [facility]

## 2024-12-31 NOTE — PROGRESS NOTES
Referred by Dr. Medina ref. provider found provider found for   Chief Complaint   Patient presents with    Follow-up     Pt is here today following up after  6 months         History of Present Illness  Nilo Benavidez Jr. is a 79 y.o. year old male patient history of cardiomyopathy ejection fraction 40%.  Echo showed ejection fraction of 30% change compared to before.  Has a previous ICD implantation.  Did not have any issue with chest pain palpitations syncope or presyncope.  Weight has been stable.  Taking his diuretics.  Discussed with the patient we will continue medication will call for any problems follow-up as scheduled    Past Medical History  Past Medical History:   Diagnosis Date    Anemia     Arthritis     BPH (benign prostatic hyperplasia)     Cardiomyopathy     Cataract     CHF (congestive heart failure)     CKD (chronic kidney disease)     Colon polyp     COPD (chronic obstructive pulmonary disease) (Multi)     COVID-19     VACCINATED    Does mobilize using cane     GERD (gastroesophageal reflux disease)     GI (gastrointestinal bleed)     UPPER    Gout     Hearing aid worn     Heart failure     History of blood transfusion     Hyperlipidemia     Hyperparathyroidism (Multi)     Hypertension     Hypokalemia     Insomnia     Joint pain     LBBB (left bundle branch block)     Mitral regurgitation     Myocardial infarction (Multi)     Other ventricular tachycardia     Ventricular tachycardia (paroxysmal)    Personal history of diseases of the blood and blood-forming organs and certain disorders involving the immune mechanism 01/06/2021    History of bleeding disorder    Personal history of other diseases of the circulatory system 01/06/2021    History of cardiac disorder    Personal history of other diseases of the musculoskeletal system and connective tissue 01/06/2021    History of arthritis    Personal history of other specified conditions 01/06/2021    History of chest pain    PSVT (paroxysmal  supraventricular tachycardia) (CMS-HCC)     Sacroiliitis (CMS-HCC)     Sleep apnea     USES CPAP    Splenomegaly     Type 2 diabetes mellitus     Uses walker     ONLY USES FOR LONG DISTANCES    Wears dentures        Social History  Social History     Tobacco Use    Smoking status: Former     Current packs/day: 0.00     Average packs/day: 0.5 packs/day for 43.0 years (21.5 ttl pk-yrs)     Types: Cigarettes     Start date:      Quit date:      Years since quittin.0    Smokeless tobacco: Never   Vaping Use    Vaping status: Never Used   Substance Use Topics    Alcohol use: Yes     Comment: OCCASIONAL    Drug use: Never       Family History     Family History   Problem Relation Name Age of Onset    Diabetes Mother      Valvular heart disease Mother      Hypertension Father         Review of Systems  As per HPI, all other systems reviewed and negative.    Allergies:  Allergies   Allergen Reactions    Caffeine Other     Hypotension    Codeine GI Upset and Nausea/vomiting     Codeine Derivatives    Lisinopril Cough     SEVERE    Penicillins GI Upset    Shellfish Containing Products GI Upset and Nausea/vomiting     LOBSTER, CRABS, SCALLOPS        Outpatient Medications:  Current Outpatient Medications   Medication Instructions    acetaminophen (TYLENOL) 500 mg, Every 6 hours PRN    albuterol 90 mcg/actuation inhaler 2 puffs, Every 4 hours PRN    allopurinol (ZYLOPRIM) 200 mg, oral, Daily    ascorbic acid (VITAMIN C) 500 mg, Daily    blood sugar diagnostic (Blood Glucose Test) strip Test twice daily.    blood sugar diagnostic (True Metrix Glucose Test Strip) strip USE ONE STRIP TWICE DAILY    carvedilol (COREG) 25 mg, oral, 2 times daily    cholecalciferol (VITAMIN D3) 2,000 Units, 4 times daily    ferrous sulfate (325 mg ferrous sulfate) 325 mg, Daily with breakfast    fluocinonide (Lidex) 0.05 % cream As needed    fluticasone (Flonase) 50 mcg/actuation nasal spray 1 spray, Each Nostril, Daily, Shake gently.  Before first use, prime pump. After use, clean tip and replace cap.    gabapentin (NEURONTIN) 300 mg, oral, Nightly    gabapentin (NEURONTIN) 100 mg, oral, Daily    glimepiride (AMARYL) 4 mg, oral, 2 times daily    ipratropium-albuteroL (Duo-Neb) 0.5-2.5 mg/3 mL nebulizer solution 3 mL, nebulization, Every 6 hours RT    Jardiance 10 mg, oral, Daily    lancets misc Use to test once daily as directed.    losartan (COZAAR) 25 mg, oral, Daily    magnesium oxide (MAG-OX) 400 mg, Daily    methocarbamol (Robaxin) 500 mg tablet     metOLazone (ZAROXOLYN) 2.5 mg, oral, As needed    mometasone (Elocon) 0.1 % cream Topical, 2 times daily    montelukast (SINGULAIR) 10 mg, Nightly    nitroglycerin (Nitrostat) 0.4 mg SL tablet PLACE 1 TABLET UNDER THE TONGUE EVERY 5 MINUTES FOR UP TO 3 DOSES AS NEEDED FOR CHEST PAIN. CALL 911 IF PAIN PERSISTS.    NON FORMULARY Vitamin D 50 MCG (2000 UT) Oral Tablet; Take PO as directed.    pantoprazole (ProtoNix) 40 mg EC tablet TAKE 1 TABLET BY MOUTH ONCE  DAILY    simvastatin (ZOCOR) 20 mg, oral, Nightly    spironolactone (ALDACTONE) 25 mg, oral, Daily    torsemide (Demadex) 100 mg tablet oral, 2 times daily    traMADol (Ultram) 50 mg tablet          Vitals:  Vitals:    12/31/24 1522   BP: 104/52   Pulse: 60       Physical Exam:  Physical Exam  Constitutional:       Appearance: Normal appearance.      Comments: Walking cane and wheelchair for assistance    HENT:      Head: Normocephalic and atraumatic.   Eyes:      Extraocular Movements: Extraocular movements intact.      Pupils: Pupils are equal, round, and reactive to light.   Cardiovascular:      Rate and Rhythm: Normal rate and regular rhythm.      Pulses: Normal pulses.      Heart sounds: Normal heart sounds.   Pulmonary:      Effort: Pulmonary effort is normal.      Breath sounds: Normal breath sounds.   Abdominal:      General: Abdomen is flat.      Palpations: Abdomen is soft.   Musculoskeletal:      Right lower leg: No edema.      Left  lower leg: No edema.   Skin:     General: Skin is warm and dry.   Neurological:      General: No focal deficit present.      Mental Status: He is alert and oriented to person, place, and time.             Assessment/Plan   Diagnoses and all orders for this visit:  ASHD (arteriosclerotic heart disease)  Benign essential hypertension  Bilateral carotid artery stenosis  Chronic diastolic heart failure  Chronic systolic congestive heart failure  Essential hypertension, benign  ICD (implantable cardioverter-defibrillator), biventricular, in situ  Mitral valve insufficiency, unspecified etiology  S/P mitral valve clip implantation  Past myocardial infarction  Mixed hyperlipidemia  BMI 40.0-44.9, adult (Multi)  Diabetes mellitus with kidney disease (Multi)  Stage 3b chronic kidney disease (Multi)  Iron deficiency anemia, unspecified iron deficiency anemia type  Former cigarette smoker  Chronic bronchitis, unspecified chronic bronchitis type (Multi)  History of ischemic cardiomyopathy          Bhanu Ricci MD [facility]  Interventional Cardiology   of [facility]     Thank you for allowing me to participate in the care of this patient. Please do not hesitate to contact me with any further questions or concerns.

## 2025-01-04 DIAGNOSIS — M47.819 SPINAL ARTHRITIS: ICD-10-CM

## 2025-01-06 RX ORDER — GABAPENTIN 300 MG/1
300 CAPSULE ORAL
Qty: 90 CAPSULE | Refills: 1 | Status: SHIPPED | OUTPATIENT
Start: 2025-01-06

## 2025-01-06 RX ORDER — GABAPENTIN 100 MG/1
100 CAPSULE ORAL DAILY
Qty: 90 CAPSULE | Refills: 1 | Status: SHIPPED | OUTPATIENT
Start: 2025-01-06

## 2025-01-20 ENCOUNTER — SPECIALTY PHARMACY (OUTPATIENT)
Dept: PHARMACY | Facility: CLINIC | Age: 80
End: 2025-01-20

## 2025-01-20 PROCEDURE — RXMED WILLOW AMBULATORY MEDICATION CHARGE

## 2025-01-22 ENCOUNTER — PHARMACY VISIT (OUTPATIENT)
Dept: PHARMACY | Facility: CLINIC | Age: 80
End: 2025-01-22
Payer: COMMERCIAL

## 2025-02-07 ENCOUNTER — HOSPITAL ENCOUNTER (OUTPATIENT)
Dept: RADIOLOGY | Facility: HOSPITAL | Age: 80
Discharge: HOME | End: 2025-02-07
Payer: MEDICARE

## 2025-02-07 ENCOUNTER — OFFICE VISIT (OUTPATIENT)
Dept: PAIN MEDICINE | Facility: CLINIC | Age: 80
End: 2025-02-07
Payer: MEDICARE

## 2025-02-07 DIAGNOSIS — M48.062 LUMBAR STENOSIS WITH NEUROGENIC CLAUDICATION: ICD-10-CM

## 2025-02-07 DIAGNOSIS — M48.062 LUMBAR STENOSIS WITH NEUROGENIC CLAUDICATION: Primary | ICD-10-CM

## 2025-02-07 PROCEDURE — G2211 COMPLEX E/M VISIT ADD ON: HCPCS | Performed by: PAIN MEDICINE

## 2025-02-07 PROCEDURE — 99214 OFFICE O/P EST MOD 30 MIN: CPT | Performed by: PAIN MEDICINE

## 2025-02-07 PROCEDURE — 99204 OFFICE O/P NEW MOD 45 MIN: CPT | Performed by: PAIN MEDICINE

## 2025-02-07 PROCEDURE — 1157F ADVNC CARE PLAN IN RCRD: CPT | Performed by: PAIN MEDICINE

## 2025-02-07 PROCEDURE — 72100 X-RAY EXAM L-S SPINE 2/3 VWS: CPT

## 2025-02-07 PROCEDURE — 1159F MED LIST DOCD IN RCRD: CPT | Performed by: PAIN MEDICINE

## 2025-02-07 PROCEDURE — 1123F ACP DISCUSS/DSCN MKR DOCD: CPT | Performed by: PAIN MEDICINE

## 2025-02-07 ASSESSMENT — PAIN - FUNCTIONAL ASSESSMENT: PAIN_FUNCTIONAL_ASSESSMENT: 0-10

## 2025-02-07 ASSESSMENT — PAIN SCALES - GENERAL: PAINLEVEL_OUTOF10: 10 - WORST POSSIBLE PAIN

## 2025-02-07 NOTE — H&P
"History Of Present Illness  Nilo Benavidez Jr. \"Josue\" is a 80 y.o. male presenting with   Low back pain, unable to stand or walk for long distances   Has been describing the back pain for multiple years was under the care of Dr. Santana who provided the patient with a diagnostic medial nerve branch block in the lower lumbar spine area but the patient did not have any significant improvement .  Was issued also a prescription of tramadol that is giving him a mild improvement in his symptoms he is not a candidate for any MRI secondary to the pacemaker but a CAT scan was obtained in December 2022.  Prior physical therapy did not provide the patient with any sustained pain relief he can he is continuing to do the stretching exercises at home without benefit.  Was advised through his cardiologist to quit using nonsteroidal anti-inflammatory secondary to his cardiac condition and he switched to Tylenol without any significant benefit.  The pain in the sitting position at 0 out of 10 and when he stand up and walk the pain is rated at 10 out of 10.    Past Medical History  Past Medical History:   Diagnosis Date    Anemia     Arthritis     BPH (benign prostatic hyperplasia)     Cardiomyopathy     Cataract     CHF (congestive heart failure)     CKD (chronic kidney disease)     Colon polyp     COPD (chronic obstructive pulmonary disease) (Multi)     COVID-19     VACCINATED    Does mobilize using cane     GERD (gastroesophageal reflux disease)     GI (gastrointestinal bleed)     UPPER    Gout     Hearing aid worn     Heart failure     History of blood transfusion     Hyperlipidemia     Hyperparathyroidism (Multi)     Hypertension     Hypokalemia     Insomnia     Joint pain     LBBB (left bundle branch block)     Mitral regurgitation     Myocardial infarction (Multi)     Other ventricular tachycardia     Ventricular tachycardia (paroxysmal)    Personal history of diseases of the blood and blood-forming organs and certain " disorders involving the immune mechanism 01/06/2021    History of bleeding disorder    Personal history of other diseases of the circulatory system 01/06/2021    History of cardiac disorder    Personal history of other diseases of the musculoskeletal system and connective tissue 01/06/2021    History of arthritis    Personal history of other specified conditions 01/06/2021    History of chest pain    PSVT (paroxysmal supraventricular tachycardia) (CMS-HCC)     Sacroiliitis (CMS-HCC)     Sleep apnea     USES CPAP    Splenomegaly     Type 2 diabetes mellitus     Uses walker     ONLY USES FOR LONG DISTANCES    Wears dentures      Surgical History  Past Surgical History:   Procedure Laterality Date    OTHER SURGICAL HISTORY  01/06/2021    Pacemaker insertion    OTHER SURGICAL HISTORY  01/05/2022    Colonoscopy    OTHER SURGICAL HISTORY  01/05/2022    Cataract surgery    OTHER SURGICAL HISTORY  01/05/2022    Cardioverter defibrillator insertion    OTHER SURGICAL HISTORY  01/05/2022    Median nerve neuroplasty    OTHER SURGICAL HISTORY  01/05/2022    Cardiac catheterization with stent placement    OTHER SURGICAL HISTORY  01/05/2022    Esophagogastroduodenoscopy    OTHER SURGICAL HISTORY  01/05/2022    Percutaneous transluminal coronary angioplasty    OTHER SURGICAL HISTORY      MITRAL VALVE CLIP IMPLNATATION     Social History  He reports that he quit smoking about 22 years ago. His smoking use included cigarettes. He started smoking about 65 years ago. He has a 21.5 pack-year smoking history. He has never used smokeless tobacco. He reports current alcohol use. He reports that he does not use drugs.    Family History  Family History   Problem Relation Name Age of Onset    Diabetes Mother      Valvular heart disease Mother      Hypertension Father          Allergies  Allergies   Allergen Reactions    Caffeine Other     Hypotension    Codeine GI Upset and Nausea/vomiting     Codeine Derivatives    Lisinopril Cough      SEVERE    Penicillins GI Upset    Shellfish Containing Products GI Upset and Nausea/vomiting     LOBSTER, CRABS, SCALLOPS     Review of Systems   12 Systems have been reviewed as follows.   Constitutional: Fever, weight gain, weight loss, appetite change, night sweats, fatigue, chills.  Eyes : blurry, double vision, vision, loss, tearing, redness, pain, sensitivity to light, glaucoma.  Ears, nose, mouth, and throat: Hearing loss, ringing in the ears, ear pain, nasal congestion, nasal drainage, nosebleeds, mouth, throat, irritation tooth problem.  Cardiovascular :chest pain, pressure, heart tracing,palpaitations , sweating, leg swelling, high or low blood pressure  Pulmonary: Cough, yellow or green sputum, blood and sputum, shortness of breath, wheezing  Gastrointestinal: Nause, vomiting, diarrhea, constipation, pain, blood in stool, or vomitus, heartburn, difficulty swallowing  Genitourinary: incontinence, abnormal bleeding, abnormal discharge, urinary frequency, urinary hesitancy, pain, impotence sexual problem, infection, urinary retention  Musculoskeletal: Pain, stiffness, joint, redness or warmth, arthritis, back pain, weakness, muscle wasting, sprain or fracture  Neuro: Weight weakness, dizziness, change in voice, change in taste change in vision, change in hearing, loss, or change of sensation, trouble walking, balance problems coordination problems, shaking, speech problem  Endocrine , cold or heat intolerance, blood sugar problem, weight gain or loss missed periods hot flashes, sweats, change in body hair, change in libido, increased thirst, increased urination  Heme/lymph: Swelling, bleeding, problem anemia, bruising, enlarged lymph nodes  Allergic/immunologic: H. plus nasal drip, watery itchy eyes, nasal drainage, immunosuppressed  The above, were reviewed and noted negative except as noted.     Physical Exam   Vital signs reviewed, documented in chart     General:  Appears well, does not look in any major  distress  Alert    HEENT:  Head atraumatic  Eyes normal inspection  PERRL  Normal ENT inspection  No signs of dehydration    NECK:  Normal inspection  Range of motion within normal     RESPIRATORY:  No respiratory distress    CVS:  Heart rate and rhythm regular    ABDOMEN/GI  Soft  Non-tender  No distention  No organomegaly      BACK:  Normal inspection, flexion and extension limited   some tenderness upon the palpation of the facet joint  Si joints none tender to palpations     EXTREMITIES:  Non-Tender  Full ROM  Normal appearance  No Pedal edema  Power symmetrical , sensory examination preserved.    NEURO:  Alert and oriented X 3  CNS normal as tested without focal neurological deficit   Sensation normal  Motor ambulate with difficulty with the assistance of a cane and short steps  reflexes absent     PSYCH:  Mood normal  Affect normal    SKIN:  Color normal  No rash  Warm  Dry  no sign of skin marking supportive of IV drug usage /abuse.     Last Recorded Vitals  There were no vitals taken for this visit.    [facility]  Outside Information  Results  CT lumbar spine wo IV contrast (Order [id number])     CT lumbar spine wo IV contrast  Order: [id number]  Narrative    MRN: [id number]  Patient Name: SARAHI VIZCARRA    STUDY:  CT L-SPINE WO CONTRAST  12/6/2022 2:21 pm    INDICATION:  SPINAL STENOSIS, SPONDYLOSIS    COMPARISON:  CT L-spine 02/22/2010.    ACCESSION NUMBER(S):  [id number]    ORDERING CLINICIAN:  ODALIS GONZALEZ    TECHNIQUE:  Axial CT images of the lumbar spine are obtained. Axial, coronal and  sagittal reconstructions are provided for review.    FINDINGS:  This report assumes 5 non-rib bearing lumbar vertebral bodies. The  lowest intervertebral disc will be labeled L5-S1.    Alignment:  There is mild retrolisthesis of L1 on L2 and L3 on L4 and  grade 1 anterolisthesis of L4 on L5, unchanged from prior.    Vertebrae/Disc Spaces:   The vertebral body heights are intact. There  is mild  intervertebral disc height narrowing at L1-L2, L2-L3, and  L3-L4 with chronic degenerative endplate changes including osteophyte  formation, overall progressed since prior. There is vacuum disc  phenomenon at L1-L2, L2-L3, L3-L4, and L5-S1.    Lower Thoracic Spine:  There is no significant central canal stenosis  in the included lower thoracic region.    T12-L1:  There is no posterior disc contour abnormality. There is no  spinal canal stenosis or neural foraminal narrowing. There is no  facet osteoarthropathy.    L1-2: Compared to the prior CT, there is a new diffuse disc bulge  with osteophytic spurring which partially effaces the ventral thecal  sac and causes mild spinal canal stenosis. There is no neural  foraminal narrowing or striking facet osteoarthropathy.    L2-3: There is a small diffuse disc bulge with osteophytic spurring  which partially effaces the ventral thecal sac. There is also partial  effacement of the posterior thecal sac due to prominent posterior  epidural fat. There is no striking spinal canal stenosis. There is  extension of disc and osteophyte into the bilateral neural foramina  and there is mild-to-moderate right and mild left neural foraminal  narrowing. There is no striking facet osteoarthropathy.    L3-4: There is a diffuse disc bulge with osteophytic spurring which  partially effaces the ventral thecal sac and along with prominent  posterior epidural fat and ligamentum flavum thickening causes  mild-to-moderate spinal canal stenosis. There is extension of the  disc and osteophyte into the bilateral neural foramina and there is  resulting moderate bilateral neural foraminal narrowing. There is  mild to moderate bilateral facet osteoarthropathy.    L4-5: There is moderate spinal canal stenosis due to combination of  grade 1 anterolisthesis, diffuse disc bulge with osteophytic  spurring, ligamentum flavum thickening, and marked facet  osteoarthropathy. Degree of spinal canal narrowing is  stable to  slightly increased since the prior CT. There is extension of disc and  osteophyte into the bilateral neural foramina and along with facet  arthropathy causes moderate bilateral neural foraminal narrowing.  There is marked bilateral facet osteoarthropathy.    L5-S1:  There is a diffuse disc bulge with osteophytic spurring which  partially effaces the ventral thecal sac. There is no spinal canal  stenosis. There is extension of disc and osteophyte into the  bilateral neural foramina and along with facet arthropathy causes  mild-to-moderate right and moderate-to-marked left neural foraminal  narrowing.    Prevertebral/Paraspinal Soft Tissues: The prevertebral and paraspinal  soft tissues are unremarkable.    Moderate atherosclerotic calcifications of the abdominal aorta and  its branches.    There are degenerative changes involving the bilateral sacroiliac  joints.    IMPRESSION:  Multilevel degenerative changes of the lumbar spine as detailed above  including mild-to-moderate spinal canal stenosis at L3-L4 and  moderate spinal canal stenosis at L4-L5. There is variable degree of  neural foraminal narrowing and facet osteoarthropathy at multiple  levels. Some of the degenerative changes have progressed since the CT  lumbar spine study from 2010.    I personally reviewed the images/study and I agree with the findings  as stated. This study was interpreted at [facility], [place], [place].  Electronically signed by: MUSTAPHA ACUNA MD  Assessment/Plan   80 years old with history and physical examination supportive of neurogenic claudication with lumbar stenosis tried and failed conservative management    Plan  I would recommend obtaining an x-ray of the lumbar spine area the patient is not a candidate for an MRI secondary to the pacemaker he is not a candidate for nonsteroidal anti-inflammatory also secondary to his cardiac condition knowing that he has tried and failed  conservative management I would recommend a lumbar epidural steroid injection to be performed under fluoroscopic guidance and the L4-L5 or the L5-S1 level with injection of contrast material to confirm needle placement benefits and risk were discussed with the patient and I will reevaluate him after the performance of the epidural for further recommendation as his case progress if we fail the epidural then I would consider him on a low-dose opiate trial      The above clinical summary has been dictated with voice recognition software. It has not been proofread for grammatical errors, typographical mistakes, or other semantic inconsistencies.    Thank you for visiting our office today. It was our pleasure to take part in your healthcare.     Please do not hesitate to contact the pain clinic after your visit with any questions or concerns at  M-F 8-4 pm       John Pradhan M.D.  Medical Director , Division of Pain Medicine [facility]   of Anesthesiology and Pain Medicine  [facility] [employer] [facility]     [place] [address] [address] [place] [address]     Office: [phone]  Fax: [phone]      John Pradhan MD

## 2025-02-18 ENCOUNTER — SPECIALTY PHARMACY (OUTPATIENT)
Dept: PHARMACY | Facility: CLINIC | Age: 80
End: 2025-02-18

## 2025-02-18 PROCEDURE — RXMED WILLOW AMBULATORY MEDICATION CHARGE

## 2025-02-24 ENCOUNTER — PHARMACY VISIT (OUTPATIENT)
Dept: PHARMACY | Facility: CLINIC | Age: 80
End: 2025-02-24
Payer: COMMERCIAL

## 2025-02-28 ENCOUNTER — APPOINTMENT (OUTPATIENT)
Dept: PRIMARY CARE | Facility: CLINIC | Age: 80
End: 2025-02-28
Payer: MEDICARE

## 2025-02-28 VITALS
BODY MASS INDEX: 40.01 KG/M2 | SYSTOLIC BLOOD PRESSURE: 108 MMHG | HEIGHT: 68 IN | HEART RATE: 68 BPM | TEMPERATURE: 97.5 F | WEIGHT: 264 LBS | DIASTOLIC BLOOD PRESSURE: 62 MMHG

## 2025-02-28 DIAGNOSIS — Z00.00 MEDICARE ANNUAL WELLNESS VISIT, SUBSEQUENT: Primary | ICD-10-CM

## 2025-02-28 DIAGNOSIS — E11.21 DIABETES MELLITUS WITH KIDNEY DISEASE (MULTI): ICD-10-CM

## 2025-02-28 DIAGNOSIS — I49.5 SICK SINUS SYNDROME (MULTI): ICD-10-CM

## 2025-02-28 DIAGNOSIS — D63.1 ANEMIA IN STAGE 3 CHRONIC KIDNEY DISEASE, UNSPECIFIED WHETHER STAGE 3A OR 3B CKD (MULTI): ICD-10-CM

## 2025-02-28 DIAGNOSIS — I50.32 CHRONIC DIASTOLIC HEART FAILURE: ICD-10-CM

## 2025-02-28 DIAGNOSIS — E11.69 TYPE 2 DIABETES MELLITUS WITH OTHER SPECIFIED COMPLICATION, WITHOUT LONG-TERM CURRENT USE OF INSULIN: ICD-10-CM

## 2025-02-28 DIAGNOSIS — Z00.00 ROUTINE GENERAL MEDICAL EXAMINATION AT HEALTH CARE FACILITY: ICD-10-CM

## 2025-02-28 DIAGNOSIS — E78.2 MIXED HYPERLIPIDEMIA: ICD-10-CM

## 2025-02-28 DIAGNOSIS — Z98.890 S/P MITRAL VALVE CLIP IMPLANTATION: ICD-10-CM

## 2025-02-28 DIAGNOSIS — N18.32 STAGE 3B CHRONIC KIDNEY DISEASE (MULTI): ICD-10-CM

## 2025-02-28 DIAGNOSIS — N18.30 ANEMIA IN STAGE 3 CHRONIC KIDNEY DISEASE, UNSPECIFIED WHETHER STAGE 3A OR 3B CKD (MULTI): ICD-10-CM

## 2025-02-28 DIAGNOSIS — J30.9 ALLERGIC RHINITIS, UNSPECIFIED SEASONALITY, UNSPECIFIED TRIGGER: ICD-10-CM

## 2025-02-28 DIAGNOSIS — Z86.79 HISTORY OF ISCHEMIC CARDIOMYOPATHY: ICD-10-CM

## 2025-02-28 DIAGNOSIS — Z95.818 S/P MITRAL VALVE CLIP IMPLANTATION: ICD-10-CM

## 2025-02-28 DIAGNOSIS — Z95.810 ICD (IMPLANTABLE CARDIOVERTER-DEFIBRILLATOR), BIVENTRICULAR, IN SITU: ICD-10-CM

## 2025-02-28 PROBLEM — J42 CHRONIC BRONCHITIS, UNSPECIFIED CHRONIC BRONCHITIS TYPE (MULTI): Status: RESOLVED | Noted: 2024-03-19 | Resolved: 2025-02-28

## 2025-02-28 RX ORDER — FLUTICASONE PROPIONATE 50 MCG
1 SPRAY, SUSPENSION (ML) NASAL DAILY
Qty: 16 G | Refills: 3 | Status: SHIPPED | OUTPATIENT
Start: 2025-02-28 | End: 2026-02-28

## 2025-02-28 ASSESSMENT — ENCOUNTER SYMPTOMS
GASTROINTESTINAL NEGATIVE: 1
CONSTITUTIONAL NEGATIVE: 1
PSYCHIATRIC NEGATIVE: 1
HEMATOLOGIC/LYMPHATIC NEGATIVE: 1
CARDIOVASCULAR NEGATIVE: 1
NEUROLOGICAL NEGATIVE: 1
MUSCULOSKELETAL NEGATIVE: 1
RESPIRATORY NEGATIVE: 1
EYES NEGATIVE: 1

## 2025-02-28 NOTE — PROGRESS NOTES
"Subjective   Reason for Visit: Nilo Benavidez Jr. is an 80 y.o. male here for a Medicare Wellness visit.          Reviewed all medications by prescribing practitioner or clinical pharmacist (such as prescriptions, OTCs, herbal therapies and supplements) and documented in the medical record.    HPI  Patient here for wellness exam he has a history of congestive heart failure chronic kidney disease history of MitraClip for mitral regurg last ejection fraction was 30% he takes torsemide daily and when he gains 3 pounds overnight he takes metolazone along with torsemide that day.  Blood sugars have been in decent range she is on glimepiride  Patient Care Team:  Jade Hunt MD as PCP - General  Jade Hunt MD as PCP - [employer] PCP  Katy Garcia MD as Consulting Physician (Hematology and Oncology)  Kristi Hsieh MD as Cardiologist (Electrophysiology)  Bhanu Ricci MD as Cardiologist (Cardiology)     Review of Systems   Constitutional: Negative.    HENT: Negative.     Eyes: Negative.    Respiratory: Negative.     Cardiovascular: Negative.    Gastrointestinal: Negative.    Genitourinary: Negative.    Musculoskeletal: Negative.    Neurological: Negative.    Hematological: Negative.    Psychiatric/Behavioral: Negative.     All other systems reviewed and are negative.      Objective   Vitals:  /62   Pulse 68   Temp 36.4 °C (97.5 °F) (Temporal)   Ht 1.727 m (5' 8\")   Wt 120 kg (264 lb)   BMI 40.14 kg/m²       Physical Exam  Vitals reviewed.   HENT:      Head: Normocephalic and atraumatic.   Neck:      Vascular: No carotid bruit.   Cardiovascular:      Rate and Rhythm: Normal rate and regular rhythm.      Pulses: Normal pulses.      Heart sounds: Normal heart sounds.   Pulmonary:      Effort: Pulmonary effort is normal.      Breath sounds: Normal breath sounds.   Abdominal:      Palpations: Abdomen is soft.   Musculoskeletal:      Right lower leg: No edema.      Left lower leg: " No edema.   Lymphadenopathy:      Cervical: No cervical adenopathy.   Neurological:      General: No focal deficit present.      Mental Status: He is alert and oriented to person, place, and time. Mental status is at baseline.      Cranial Nerves: No cranial nerve deficit.       Assessment & Plan  Allergic rhinitis, unspecified seasonality, unspecified trigger    Orders:    fluticasone (Flonase) 50 mcg/actuation nasal spray; Administer 1 spray into each nostril once daily. Shake gently. Before first use, prime pump. After use, clean tip and replace cap.    Routine [facility] medical examination at [facility]         Sick sinus syndrome (Multi)         Chronic diastolic heart failure  Patient has combined systolic and diastolic CHF he has AICD he has MitraClip.  Last CBC was normal back in October he does have a history of iron deficiency anemia.  He is also on pantoprazole for acid reflux he is on spironolactone and Jardiance for CHF along with other diuretics..  He will continue beta-blocker carvedilol for hypertension and CHF.  Continue statins for hyperlipidemia.       BMI 40.0-44.9, adult (Multi)         Type 2 diabetes mellitus with other specified complication, without long-term current use of insulin    Orders:    Hemoglobin A1C; Future    Stage 3b chronic kidney disease (Multi)         Mixed hyperlipidemia    Orders:    Cholesterol, LDL Direct; Future    Lipid Panel; Future    Comprehensive Metabolic Panel; Future    Anemia in stage 3 chronic kidney disease, unspecified whether stage 3a or 3b CKD (Multi)    Orders:    CBC and Auto Differential; Future    S/P mitral valve clip implantation         ICD (implantable cardioverter-defibrillator), biventricular, in situ         History of ischemic cardiomyopathy         Diabetes mellitus with kidney disease (Multi)         Medicare annual wellness visit, subsequent

## 2025-02-28 NOTE — ASSESSMENT & PLAN NOTE
Orders:    Cholesterol, LDL Direct; Future    Lipid Panel; Future    Comprehensive Metabolic Panel; Future

## 2025-02-28 NOTE — ASSESSMENT & PLAN NOTE
Patient has combined systolic and diastolic CHF he has AICD he has MitraClip.  Last CBC was normal back in October he does have a history of iron deficiency anemia.  He is also on pantoprazole for acid reflux he is on spironolactone and Jardiance for CHF along with other diuretics..  He will continue beta-blocker carvedilol for hypertension and CHF.  Continue statins for hyperlipidemia.

## 2025-03-03 ENCOUNTER — APPOINTMENT (OUTPATIENT)
Dept: PAIN MEDICINE | Facility: CLINIC | Age: 80
End: 2025-03-03
Payer: MEDICARE

## 2025-03-03 DIAGNOSIS — I10 ESSENTIAL HYPERTENSION, BENIGN: ICD-10-CM

## 2025-03-03 RX ORDER — SPIRONOLACTONE 25 MG/1
25 TABLET ORAL DAILY
Qty: 90 TABLET | Refills: 3 | Status: SHIPPED | OUTPATIENT
Start: 2025-03-03 | End: 2026-03-03

## 2025-03-03 NOTE — TELEPHONE ENCOUNTER
Received request for prescription refills for patient.   Patient follows with Bhanu Ricci M.D.      Request is for Spironolactone    Is patient currently on medication yes    Last OV 12/31/24  Next OV 6/24/25    Pended for signing and sent to provider

## 2025-03-04 ENCOUNTER — HOSPITAL ENCOUNTER (OUTPATIENT)
Dept: PAIN MEDICINE | Facility: CLINIC | Age: 80
Discharge: HOME | End: 2025-03-04
Payer: MEDICARE

## 2025-03-04 VITALS
OXYGEN SATURATION: 95 % | HEART RATE: 74 BPM | TEMPERATURE: 97.8 F | RESPIRATION RATE: 20 BRPM | DIASTOLIC BLOOD PRESSURE: 58 MMHG | SYSTOLIC BLOOD PRESSURE: 128 MMHG

## 2025-03-04 DIAGNOSIS — M48.062 LUMBAR STENOSIS WITH NEUROGENIC CLAUDICATION: ICD-10-CM

## 2025-03-04 PROCEDURE — 62323 NJX INTERLAMINAR LMBR/SAC: CPT | Performed by: PAIN MEDICINE

## 2025-03-04 PROCEDURE — 2550000001 HC RX 255 CONTRASTS: Performed by: PAIN MEDICINE

## 2025-03-04 PROCEDURE — 2500000004 HC RX 250 GENERAL PHARMACY W/ HCPCS (ALT 636 FOR OP/ED): Performed by: PAIN MEDICINE

## 2025-03-04 RX ORDER — METHYLPREDNISOLONE ACETATE 80 MG/ML
INJECTION, SUSPENSION INTRA-ARTICULAR; INTRALESIONAL; INTRAMUSCULAR; SOFT TISSUE AS NEEDED
Status: DISCONTINUED | OUTPATIENT
Start: 2025-03-04 | End: 2025-03-05 | Stop reason: HOSPADM

## 2025-03-04 RX ORDER — BUPIVACAINE HYDROCHLORIDE 2.5 MG/ML
INJECTION, SOLUTION EPIDURAL; INFILTRATION; INTRACAUDAL AS NEEDED
Status: DISCONTINUED | OUTPATIENT
Start: 2025-03-04 | End: 2025-03-05 | Stop reason: HOSPADM

## 2025-03-04 RX ADMIN — METHYLPREDNISOLONE ACETATE 80 MG: 80 INJECTION, SUSPENSION INTRA-ARTICULAR; INTRALESIONAL; INTRAMUSCULAR; SOFT TISSUE at 13:20

## 2025-03-04 RX ADMIN — IOHEXOL 4 ML: 300 INJECTION, SOLUTION INTRAVENOUS at 13:20

## 2025-03-04 RX ADMIN — BUPIVACAINE HYDROCHLORIDE 10 ML: 2.5 INJECTION, SOLUTION EPIDURAL; INFILTRATION; INTRACAUDAL; PERINEURAL at 13:19

## 2025-03-04 ASSESSMENT — PAIN DESCRIPTION - DESCRIPTORS: DESCRIPTORS: ACHING;BURNING;STABBING

## 2025-03-04 ASSESSMENT — PATIENT HEALTH QUESTIONNAIRE - PHQ9
2. FEELING DOWN, DEPRESSED OR HOPELESS: NOT AT ALL
1. LITTLE INTEREST OR PLEASURE IN DOING THINGS: NOT AT ALL
SUM OF ALL RESPONSES TO PHQ9 QUESTIONS 1 AND 2: 0

## 2025-03-04 ASSESSMENT — PAIN - FUNCTIONAL ASSESSMENT
PAIN_FUNCTIONAL_ASSESSMENT: 0-10
PAIN_FUNCTIONAL_ASSESSMENT: 0-10

## 2025-03-04 ASSESSMENT — PAIN SCALES - GENERAL
PAINLEVEL_OUTOF10: 8
PAINLEVEL_OUTOF10: 0 - NO PAIN

## 2025-03-04 NOTE — DISCHARGE INSTRUCTIONS
Post-injection instructions:    Your pain may not be gone immediately after the procedure--it usually takes the steroid 3-5 days to start working.   It may take several weeks for the medicine to reach its' full effect.   Pay attention to how much pain relief (what percentage compared to before the procedure) you get and for how long it lasts.     Activity: Avoid strenuous activity for 24 hours. After that return to your normal activity level.     Bandages: Remove after 24 hours     Showering/Bathing: You may shower after bandage is removed     Follow up: CALL OFFICE IN 7 DAYS [phone] LEAVE MESSAGE ABOUT THE RELIEF THAT WAS OBTAINED      Call the doctor immediately: if you notice:     Excessive bleeding from procedure site (brisk bright red bleeding from the site or bleeding that soaks the bandages or does not stop)   Severe headache  Inability to walk, leg or arm weakness or numbness that is worse after the procedure   Uncontrolled pain   New urinary or fecal incontinence   Signs of infection: Fever above 101.5F, redness, swelling, pus or drainage from the site    Epidural Injection    Why is this procedure done?  With an epidural injection, the doctor injects drugs deep into the area around your spinal cord. This is different than epidural anesthesia that is used for surgery or when a woman has a baby. Your spine is a group of bones in your back that protect the nerves in your spinal cord. Problems with your spine can cause swollen nerves in the spinal cord. This swelling leads to pain and can limit movement. In an epidural injection, the doctor may give you a drug to help with swelling and pain.  You may have an epidural injection in different parts of your back, based on where your pain is. For pain in your head or arms, you may have a cervical epidural injection. If your pain is in your upper or middle back, you may get a thoracic epidural injection. For pain in your lower back or legs, you may get a  [facility]  [address] [place] [id number]  Phone: [phone]  Fax:     [phone]    Wound Foot Left Lateral Surgical Wound (Active)   Date First Assessed/Time First Assessed: 12/19/23 0800   Present on Original Admission: Yes  Location: Foot  Laterality: Left  Modifier: Lateral  Primary Wound Type: Surgical Wound      Assessments 1/15/2024  9:30 AM   Wound Image     Wound Care Team Consult Date 12/19/23   Present at Time of This Admission Yes   Dressing Assessment Intact;Drainage present   Dressing Activity Changed   Dressing Changed On   01/15/24   Wound Exudate Small;Serosanguineous;No odor   Cleansing Agent Normal saline;Hypochlorous acid (e.g. Vashe)   Wound Bed/Tissue Type Pink;Necrotic tissue, slough   Periwound Condition Edema;Hyperpigmented   Wound Edge Attached to wound bed   Wound Dressing ABD dressing;Gauze roll-conforming (e.g. Lyly) (alginate, drawtex roll, 1 ACewrap)   Wound Length (cm) 0.9 cm   Wound Width (cm) 1.4 cm   Wound Depth (cm) 0.4 cm   Wound Surface Area (cm^2) 1.26 cm^2   Wound Volume (cm^3) 0.504 cm^3   PhotoTaken? Yes   Debridement Selective - Conservative Sharp Selective - Ultrasound (curette)   Wound Volume Change (Initial) 0 cm3   Wound Volume % Change (Initial) 0 %   Wound Volume Change (30 days) 0 cm3   Wound Volume % Change (30 days) 0 %       Wound Foot Left Dorsal (Active)   Date First Assessed/Time First Assessed: 12/19/23 0800   Present on Original Admission: Yes  Location: Foot  Laterality: Left  Modifier: Dorsal      Assessments 1/15/2024  9:30 AM   Wound Care Team Consult Date 12/19/23   Present at Time of This Admission Yes   Dressing Assessment Drainage present;Intact   Dressing Activity Changed   Dressing Changed On   01/15/24   Wound Exudate Small;Serosanguineous;No odor   Cleansing Agent Normal saline;Hypochlorous acid (e.g. Vashe)   Wound Bed/Tissue Type Pink;Necrotic tissue, slough;Yellow   Periwound Condition  Edema;Hyperpigmented;Macerated   Wound Edge Attached to wound bed   Wound Dressing ABD dressing;Gauze roll-conforming (e.g. Lyly) (1 acewrap)   Wound Length (cm) 6 cm   Wound Width (cm) 2 cm   Wound Depth (cm) 0.1 cm   Wound Surface Area (cm^2) 12 cm^2   Wound Volume (cm^3) 1.2 cm^3   PhotoTaken? Yes   Debridement Selective - Conservative Sharp Selective - Ultrasound (curette)   Wound Volume Change (Initial) -1.25 cm3   Wound Volume % Change (Initial) -51.02 %   Wound Volume Change (30 days) -1.25 cm3   Wound Volume % Change (30 days) -51.02 %       Wound Heel Left Posterior (Active)   Date First Assessed/Time First Assessed: 12/19/23 0800   Present on Original Admission: Yes  Location: Heel  Laterality: Left  Modifier: Posterior      Assessments 1/15/2024  9:30 AM   Wound Care Team Consult Date 12/19/23   Present at Time of This Admission Yes   Dressing Assessment Intact;Drainage present   Dressing Activity Changed   Dressing Changed On   01/15/24   Wound Exudate Minimal;Serosanguineous;No odor   Cleansing Agent Normal saline;Hypochlorous acid (e.g. Vashe)   Wound Bed/Tissue Type Pink;Red;Yellow;Necrotic tissue, slough   Periwound Condition Hyperpigmented   Wound Edge Attached to wound bed   Wound Dressing ABD dressing;Gauze roll-conforming (e.g. Lyly) (1 ace wrap)   Wound Length (cm) 2.3 cm   Wound Width (cm) 2.9 cm   Wound Depth (cm) 0.4 cm   Wound Surface Area (cm^2) 6.67 cm^2   Wound Volume (cm^3) 2.668 cm^3   PhotoTaken? Yes   Debridement Selective - Conservative Sharp Selective - Ultrasound (scissors)   Wound Volume Change (Initial) -2.28 cm3   Wound Volume % Change (Initial) -46.1 %   Wound Volume Change (30 days) -2.28 cm3   Wound Volume % Change (30 days) -46.1 %     New Wound Care Orders:    Complete Wound Care  Every visit  Diagnosis: Surgical wound  Dressing change(s) to be done by: Wound Care Team  Dressing change(s) to be done by: Other (specify)  Other (specify): HH-RN/caregiver/wife  Dressing  lumbar epidural injection.    What will the results be?  The treatment may:  Lower pain  Reduce swelling in the nerves  Improve movement  What happens before the procedure?  Your doctor will take your history. Talk to the doctor about:  All the drugs you are taking. Be sure to include all prescription and over-the-counter (OTC) drugs, and herbal supplements. Tell the doctor about any drug allergy. Bring a list of drugs you take with you.  If you have high blood sugar or diabetes. Your drugs may need to be changed.  Any bleeding problems. Be sure to tell your doctor if you are taking any drugs that may cause bleeding. Some of these are warfarin, rivaroxaban, apixaban, ticagrelor, clopidogrel, ketorolac, ibuprofen, naproxen, or aspirin. Certain vitamins and herbs, such as garlic and fish oil, may also add to the risk for bleeding. You may need to stop these drugs as well. Talk to your doctor about them.  Tell the doctor if you are pregnant.  You will not be allowed to drive right away after the procedure. Ask a family member or a friend to drive you home.  What happens during the procedure?  To help the doctor make sure the drugs are being injected in the right place, your doctor may do an x-ray of your spine. Other times your doctor may do a continuous x-ray during the procedure. This is a fluoroscopy. The doctor may also use a colored dye or a contrast dye to check where to inject the drug.  You may be given a drug to help you relax. You may be given a drug to make the area of the injection numb.  The doctor will clean the skin on your back or neck. This helps prevent infection.  The doctor will put a needle through the skin toward your spine. The drug will be injected into a space near the spine.  The needle will be taken out and a bandage will be placed over the injection site.  What happens after the procedure?  Staff will check on you to make sure you are doing well. They will tell you when you can go  frequency: Every other day  Wound location: Left lateral and dorsal Foot  Dressing change(s) to be done using: Clean Technique  Soak wound for (minutes): 5  Soak solution: Vashe (Hydrochlorous Acid) if available from wound care  Clean wound with: Wound cleanser  Protect periwound with: Skin prep  Protect periwound with: Other (specify)  Other (specify): Apply zinc to periwound  Topical agents: Collagen with silver  Dressing type: Gauze roll-conforming (e.g. Lyly)  Dressing type: Alginate  Dressing type: Other (specify)  Other (specify): drawtex roll  Cover dressing: ABD pad  Specify order of application: Apply puracol Ag to wound bed, cover with alginate, drawtex roll, abdominal pad, and wrap with kerlix. 1 ACE wrap to secure all dressings.  Complete Wound Care  Every visit  Diagnosis: Other (specify)  Other (specify): arterial  Dressing change(s) to be done by: Wound Care Team  Dressing change(s) to be done by: Other (specify)  Other (specify): RAMONITA-RN, wife  Dressing frequency: Every other day  Wound location: Left posterior Heel  Dressing change(s) to be done using: Clean Technique  Soak wound for (minutes): 5  Soak solution: Vashe (Hydrochlorous Acid) if available from wound care  Clean wound with: Normal saline  Topical agents: Collagenase santyl ointment  Dressing type: Other (specify)  Other (specify): gauze-moist-dakins, drawtex roll, kerlix  Cover dressing: ABD pad  Specify order of application: Apply nicke thick santyl, follow with moist-gauze-dakins, cover with drawtex roll, abdominal pad, wrap with kerlix, and secure all dressings with acewrap.    Order Comments:  ,      Lubna Mena, RN   1/15/2024      home.  What care is needed at home?  Relax on the day of the injection.  Do not drive or run machines for at least 12 hours afterwards.  Apply ice to the injection site. Place an ice pack or a bag of frozen peas wrapped in a towel over the painful part. Never put ice right on the skin. Do not leave the ice on more than 10 to 15 minutes at a time.  It may take a few days before you will feel the effects of the injection.  What follow-up care is needed?  Your doctor may ask you to make visits to the office to check on your progress. Be sure to keep these visits. You may also need to see a physical therapist (PT). The PT will teach you exercises to help you get back your strength and motion. Ask your doctor when you can exercise.  What problems could happen?  Bleeding  Infection (rare)  Headache  Nerve injury  If you have diabetes, your blood sugar can go up after the injection. Check with your doctor if you need more treatment for this.  Last Reviewed Date

## 2025-03-11 ENCOUNTER — TELEPHONE (OUTPATIENT)
Dept: PAIN MEDICINE | Facility: CLINIC | Age: 80
End: 2025-03-11
Payer: MEDICARE

## 2025-03-13 ENCOUNTER — HOSPITAL ENCOUNTER (OUTPATIENT)
Dept: CARDIOLOGY | Facility: HOSPITAL | Age: 80
Discharge: HOME | End: 2025-03-13
Payer: MEDICARE

## 2025-03-13 DIAGNOSIS — I47.29 VENTRICULAR TACHYCARDIA (PAROXYSMAL) (MULTI): ICD-10-CM

## 2025-03-13 DIAGNOSIS — Z95.810 AICD (AUTOMATIC CARDIOVERTER/DEFIBRILLATOR) PRESENT: ICD-10-CM

## 2025-03-13 PROCEDURE — 93296 REM INTERROG EVL PM/IDS: CPT

## 2025-03-19 ENCOUNTER — SPECIALTY PHARMACY (OUTPATIENT)
Dept: PHARMACY | Facility: CLINIC | Age: 80
End: 2025-03-19

## 2025-03-19 PROCEDURE — RXMED WILLOW AMBULATORY MEDICATION CHARGE

## 2025-03-22 ENCOUNTER — PHARMACY VISIT (OUTPATIENT)
Dept: PHARMACY | Facility: CLINIC | Age: 80
End: 2025-03-22
Payer: COMMERCIAL

## 2025-03-28 DIAGNOSIS — M47.819 SPINAL ARTHRITIS: ICD-10-CM

## 2025-03-31 RX ORDER — GABAPENTIN 300 MG/1
300 CAPSULE ORAL
Qty: 100 CAPSULE | Refills: 1 | Status: SHIPPED | OUTPATIENT
Start: 2025-03-31

## 2025-03-31 RX ORDER — GABAPENTIN 100 MG/1
100 CAPSULE ORAL DAILY
Qty: 100 CAPSULE | Refills: 1 | Status: SHIPPED | OUTPATIENT
Start: 2025-03-31

## 2025-04-07 DIAGNOSIS — K86.2 PANCREAS CYST (HHS-HCC): Primary | ICD-10-CM

## 2025-04-16 ENCOUNTER — SPECIALTY PHARMACY (OUTPATIENT)
Dept: PHARMACY | Facility: CLINIC | Age: 80
End: 2025-04-16

## 2025-04-16 PROCEDURE — RXMED WILLOW AMBULATORY MEDICATION CHARGE

## 2025-04-18 ENCOUNTER — PHARMACY VISIT (OUTPATIENT)
Dept: PHARMACY | Facility: CLINIC | Age: 80
End: 2025-04-18
Payer: COMMERCIAL

## 2025-04-18 DIAGNOSIS — K21.9 GASTROESOPHAGEAL REFLUX DISEASE, UNSPECIFIED WHETHER ESOPHAGITIS PRESENT: ICD-10-CM

## 2025-04-18 DIAGNOSIS — I50.32 CHRONIC DIASTOLIC HEART FAILURE: ICD-10-CM

## 2025-04-18 DIAGNOSIS — I10 BENIGN ESSENTIAL HYPERTENSION: ICD-10-CM

## 2025-04-18 DIAGNOSIS — M10.9 GOUT, UNSPECIFIED CAUSE, UNSPECIFIED CHRONICITY, UNSPECIFIED SITE: ICD-10-CM

## 2025-04-21 RX ORDER — PANTOPRAZOLE SODIUM 40 MG/1
40 TABLET, DELAYED RELEASE ORAL DAILY
Qty: 90 TABLET | Refills: 1 | Status: SHIPPED | OUTPATIENT
Start: 2025-04-21

## 2025-04-21 RX ORDER — METOLAZONE 2.5 MG/1
TABLET ORAL
Qty: 100 TABLET | Refills: 2 | Status: SHIPPED | OUTPATIENT
Start: 2025-04-21

## 2025-04-21 RX ORDER — ALLOPURINOL 100 MG/1
200 TABLET ORAL DAILY
Qty: 180 TABLET | Refills: 1 | Status: SHIPPED | OUTPATIENT
Start: 2025-04-21

## 2025-04-21 NOTE — TELEPHONE ENCOUNTER
Received request for prescription refills for patient.   Patient follows with Dr. Ricci    Request is for Metolazone 2.5 mg  Is patient currently on medication yes    Last OV 12/31/2024  Next OV 06/24/2025    Pended for signing and sent to provider

## 2025-05-01 DIAGNOSIS — E11.69 TYPE 2 DIABETES MELLITUS WITH OTHER SPECIFIED COMPLICATION, WITHOUT LONG-TERM CURRENT USE OF INSULIN: ICD-10-CM

## 2025-05-02 RX ORDER — GLIMEPIRIDE 4 MG/1
4 TABLET ORAL 2 TIMES DAILY
Qty: 180 TABLET | Refills: 1 | Status: SHIPPED | OUTPATIENT
Start: 2025-05-02

## 2025-05-04 DIAGNOSIS — I10 BENIGN ESSENTIAL HYPERTENSION: ICD-10-CM

## 2025-05-05 RX ORDER — TORSEMIDE 100 MG/1
50 TABLET ORAL 2 TIMES DAILY
Qty: 90 TABLET | Refills: 3 | Status: SHIPPED | OUTPATIENT
Start: 2025-05-05 | End: 2026-05-05

## 2025-05-05 NOTE — TELEPHONE ENCOUNTER
Received request for prescription refills for patient.   Patient follows with Dr. Ricci    Request is for Torsemide  Is patient currently on medication yes    Last OV 12/31/2024  Next OV 6/24/2025    Pended for signing and sent to provider

## 2025-05-13 ENCOUNTER — SPECIALTY PHARMACY (OUTPATIENT)
Dept: PHARMACY | Facility: CLINIC | Age: 80
End: 2025-05-13

## 2025-05-13 PROCEDURE — RXMED WILLOW AMBULATORY MEDICATION CHARGE

## 2025-05-19 ENCOUNTER — PHARMACY VISIT (OUTPATIENT)
Dept: PHARMACY | Facility: CLINIC | Age: 80
End: 2025-05-19
Payer: COMMERCIAL

## 2025-05-21 ENCOUNTER — TELEPHONE (OUTPATIENT)
Dept: PRIMARY CARE | Facility: CLINIC | Age: 80
End: 2025-05-21
Payer: MEDICARE

## 2025-05-21 DIAGNOSIS — J02.9 SORE THROAT: ICD-10-CM

## 2025-05-21 RX ORDER — AZITHROMYCIN 250 MG/1
TABLET, FILM COATED ORAL
Qty: 6 TABLET | Refills: 0 | Status: SHIPPED | OUTPATIENT
Start: 2025-05-21 | End: 2025-05-26

## 2025-05-21 RX ORDER — AZITHROMYCIN 250 MG/1
TABLET, FILM COATED ORAL
Qty: 6 TABLET | Refills: 0 | Status: SHIPPED | OUTPATIENT
Start: 2025-05-21 | End: 2025-05-21 | Stop reason: SDUPTHER

## 2025-05-28 DIAGNOSIS — I50.32 CHRONIC DIASTOLIC HEART FAILURE: ICD-10-CM

## 2025-05-28 NOTE — TELEPHONE ENCOUNTER
Received request for prescription refills for patient.   Patient follows with Dr. Ricci    Request is for empagliflozin  Is patient currently on medication yes    Last OV 12/31/2024  Next OV 6/24/2025    Pended for signing and sent to provider

## 2025-05-30 ENCOUNTER — HOSPITAL ENCOUNTER (OUTPATIENT)
Dept: RADIOLOGY | Facility: HOSPITAL | Age: 80
Discharge: HOME | End: 2025-05-30
Payer: MEDICARE

## 2025-05-30 DIAGNOSIS — K86.2 PANCREAS CYST (HHS-HCC): ICD-10-CM

## 2025-05-30 PROCEDURE — 74176 CT ABD & PELVIS W/O CONTRAST: CPT

## 2025-05-30 NOTE — PROGRESS NOTES
"  Patient ID: Nilo Benavidez Jr. \"Isaac" is a 80 y.o. male who presents for Follow-up (Pt. Present today for 6 month follow up for ICD (implantable cardioverter-defibrillator), biventricular, in situ).  History of Present Illness  Nilo Benavidez Jr. \"Isaac" is an 80 year old male who presents for a cardiovascular follow-up.  He is accompanied by his friend.  He experiences occasional shortness of breath, with no clear cause identified. He consumes a small glass of wine or a shot of rum before bed, suspecting it might trigger his joint possible gout symptoms. He denies any recent hospitalizations. He is currently taking Jardiance and glimepiride.  We discussed eventual future generator change and what medications need to be held before procedure.  He denies taking metformin, Farxiga, aspirin, Plavix, Vitamin E, or fish oil.  He reports that he is not able to lie flat unless he is sedated.      PMHx:  See list    PSHx:  See list    Social Hx:  Social History[1]    Family Hx:  Family History[2]    Review of Systems   Cardiovascular:  Negative for chest pain, dyspnea on exertion and palpitations.   All other systems reviewed and are negative.        Objective     /70 (BP Location: Left arm, Patient Position: Sitting)   Pulse 68   Ht 1.727 m (5' 8\")   Wt 111 kg (245 lb)   BMI 37.25 kg/m²        LABS:  CBC:   Lab Results   Component Value Date    WBC 9.3 10/15/2024    RBC 4.72 10/15/2024    HGB 15.0 10/15/2024    HCT 45.8 10/15/2024    MCV 97 10/15/2024    MCH 31.8 10/15/2024    MCHC 32.8 10/15/2024    RDW 14.8 (H) 10/15/2024     10/15/2024    MPV 12.7 (H) 10/16/2023     CBC with Differential:    Lab Results   Component Value Date    WBC 9.3 10/15/2024    RBC 4.72 10/15/2024    HGB 15.0 10/15/2024    HCT 45.8 10/15/2024     10/15/2024    MCV 97 10/15/2024    MCH 31.8 10/15/2024    MCHC 32.8 10/15/2024    RDW 14.8 (H) 10/15/2024    NRBC 0.0 10/15/2024    LYMPHOPCT 5.4 05/29/2024    MONOPCT 14.6 " 05/29/2024    EOSPCT 0.2 05/29/2024    BASOPCT 0.1 05/29/2024    MONOSABS 2.23 (H) 05/29/2024    LYMPHSABS 0.83 05/29/2024    EOSABS 0.03 05/29/2024    BASOSABS 0.02 05/29/2024     CMP:    Lab Results   Component Value Date     (L) 10/15/2024    K 4.0 10/15/2024    CL 91 (L) 10/15/2024    CO2 31 10/15/2024    BUN 52 (H) 10/15/2024    CREATININE 1.77 (H) 10/15/2024    GLUCOSE 194 (H) 10/15/2024    PROT 6.9 10/15/2024    CALCIUM 9.5 10/15/2024    BILITOT 0.5 10/15/2024    ALKPHOS 73 10/15/2024    AST 22 10/15/2024    ALT 18 10/15/2024     BMP:    Lab Results   Component Value Date     (L) 10/15/2024    K 4.0 10/15/2024    CL 91 (L) 10/15/2024    CO2 31 10/15/2024    BUN 52 (H) 10/15/2024    CREATININE 1.77 (H) 10/15/2024    CALCIUM 9.5 10/15/2024    GLUCOSE 194 (H) 10/15/2024     Magnesium:  Lab Results   Component Value Date    MG 2.40 04/03/2022           Physical Exam  Constitutional:       General: Awake.      Appearance: Normal and healthy appearance. Well-developed and not in distress. Obese.   Neck:      Vascular: No JVR. JVD normal.   Pulmonary:      Effort: Pulmonary effort is normal.      Breath sounds: Normal breath sounds. No wheezing. No rhonchi. No rales.   Chest:      Chest wall: Not tender to palpatation.      Comments: Left sided device pocket- healed and well approximated. No swelling or hematoma     Cardiovascular:      PMI at left midclavicular line. Normal rate. Regular rhythm. Normal S1. Normal S2.       Murmurs: There is no murmur.      No gallop.  No click. No rub.   Pulses:     Intact distal pulses.   Edema:     Peripheral edema absent.   Abdominal:      Tenderness: There is no abdominal tenderness.   Musculoskeletal: Normal range of motion.         General: No tenderness. Skin:     General: Skin is warm and dry.   Neurological:      General: No focal deficit present.      Mental Status: Alert and oriented to person, place and time.           Device check. See scanned.  ECG. See  scanned.    Assessment & Plan  Chronic systolic heart failure. Refractory heart failure and LBBB, s/p upgrade to biventricular device in 2008 and generator change in 2016. Stable.   Sinus node dysfunction Chronotropic incompetence.   NYHA IIC heart failure.    Condition well-managed with stable weight, no significant fluid retention, and no arrhythmias. Shortness of breath likely due to sinus drainage.  - Continue current heart failure management regimen.  - Monitor for fluid retention, adjust medications as needed.  - Follow up with primary care physician and cardiologist before ICD replacement.    Biventricular ICD in situ  Medtronic DTPA 2 D1 biventricular ICD.  95 0.6% BiV pacing.  63.6% atrial pacing.  Estimate longevity device 8 months  ICD functioning properly, battery nearing elective replacement in approximately 8 months. Close monitoring required for battery status.  - Schedule remote device check in three months.  - Plan in-person follow-up with PA in four months and cardiologist in eight months.  - Hold Jardiance for four days prior to ICD replacement.  - Educated on signs of ICD battery nearing replacement, instructed to contact device clinic if these occur.    History of additional RV lead due to RV fracture. Chronic. Stable with prior lead capped.     Paroxysmal atrial fibrillation.  Less than 0.1% A-fib.  - Will monitor burden of A-fib    Counseled greater than 50% of the visit.  The patient and I discussed arrhythmia, ECG, shared decision making, CHF, eventual generator change, preoperative cardiac evaluation, treatment options, risk, benefits, and imponderables.  Lifestyle modifications reviewed.  All questions answered.  Patient appreciative of care  Assessment & Plan  ICD (implantable cardioverter-defibrillator), biventricular, in situ    Sick sinus syndrome (Multi)    Paroxysmal SVT (supraventricular tachycardia)    Mixed hyperlipidemia    LBBB (left bundle branch block)    Hypotension,  unspecified hypotension type    History of ischemic cardiomyopathy    Essential hypertension, benign    Chronotropic incompetence with sinus node dysfunction    Chronic systolic congestive heart failure    BMI 37.0-37.9, adult    Former cigarette smoker    Encounter for medication review and counseling    Encounter to discuss treatment options          This medical note was created with the assistance of artificial intelligence (AI) for documentation purposes. The content has been reviewed and confirmed by the healthcare provider for accuracy and completeness. Patient consented to the use of audio recording and use of AI during their visit.     I, , personally performed the services described in the documentation as scribed by the nurse in my presence, and confirm it is both accurate and complete.     Total, 41, inclusive of review of preoperative cardiac evaluation, biventricular defibrillator, ECG, and follow up       [1]   Social History  Socioeconomic History    Marital status:    Tobacco Use    Smoking status: Former     Current packs/day: 0.00     Average packs/day: 0.5 packs/day for 43.0 years (21.5 ttl pk-yrs)     Types: Cigarettes     Start date:      Quit date:      Years since quittin.4    Smokeless tobacco: Never   Vaping Use    Vaping status: Never Used   Substance and Sexual Activity    Alcohol use: Yes     Comment: OCCASIONAL    Drug use: Never    Sexual activity: Defer   [2]   Family History  Problem Relation Name Age of Onset    Diabetes Mother      Valvular heart disease Mother      Hypertension Father

## 2025-06-03 ENCOUNTER — HOSPITAL ENCOUNTER (OUTPATIENT)
Dept: CARDIOLOGY | Facility: HOSPITAL | Age: 80
Discharge: HOME | End: 2025-06-03
Payer: MEDICARE

## 2025-06-03 ENCOUNTER — APPOINTMENT (OUTPATIENT)
Dept: CARDIOLOGY | Facility: CLINIC | Age: 80
End: 2025-06-03
Payer: MEDICARE

## 2025-06-03 VITALS
SYSTOLIC BLOOD PRESSURE: 122 MMHG | WEIGHT: 245 LBS | DIASTOLIC BLOOD PRESSURE: 70 MMHG | BODY MASS INDEX: 37.13 KG/M2 | HEART RATE: 68 BPM | HEIGHT: 68 IN

## 2025-06-03 DIAGNOSIS — I49.8 CHRONOTROPIC INCOMPETENCE WITH SINUS NODE DYSFUNCTION: ICD-10-CM

## 2025-06-03 DIAGNOSIS — E78.2 MIXED HYPERLIPIDEMIA: ICD-10-CM

## 2025-06-03 DIAGNOSIS — I44.7 LBBB (LEFT BUNDLE BRANCH BLOCK): ICD-10-CM

## 2025-06-03 DIAGNOSIS — Z87.891 FORMER CIGARETTE SMOKER: ICD-10-CM

## 2025-06-03 DIAGNOSIS — I50.22 CHRONIC SYSTOLIC CONGESTIVE HEART FAILURE: ICD-10-CM

## 2025-06-03 DIAGNOSIS — Z95.810 ICD (IMPLANTABLE CARDIOVERTER-DEFIBRILLATOR), BIVENTRICULAR, IN SITU: ICD-10-CM

## 2025-06-03 DIAGNOSIS — I47.10 PAROXYSMAL SVT (SUPRAVENTRICULAR TACHYCARDIA): ICD-10-CM

## 2025-06-03 DIAGNOSIS — Z86.79 HISTORY OF ISCHEMIC CARDIOMYOPATHY: ICD-10-CM

## 2025-06-03 DIAGNOSIS — Z71.89 ENCOUNTER TO DISCUSS TREATMENT OPTIONS: ICD-10-CM

## 2025-06-03 DIAGNOSIS — Z95.810 ICD (IMPLANTABLE CARDIOVERTER-DEFIBRILLATOR), BIVENTRICULAR, IN SITU: Primary | ICD-10-CM

## 2025-06-03 DIAGNOSIS — I10 ESSENTIAL HYPERTENSION, BENIGN: ICD-10-CM

## 2025-06-03 DIAGNOSIS — I95.9 HYPOTENSION, UNSPECIFIED HYPOTENSION TYPE: ICD-10-CM

## 2025-06-03 DIAGNOSIS — Z71.89 ENCOUNTER FOR MEDICATION REVIEW AND COUNSELING: ICD-10-CM

## 2025-06-03 DIAGNOSIS — I49.5 SICK SINUS SYNDROME (MULTI): ICD-10-CM

## 2025-06-03 PROCEDURE — 1159F MED LIST DOCD IN RCRD: CPT | Performed by: INTERNAL MEDICINE

## 2025-06-03 PROCEDURE — 93284 PRGRMG EVAL IMPLANTABLE DFB: CPT

## 2025-06-03 PROCEDURE — 93284 PRGRMG EVAL IMPLANTABLE DFB: CPT | Performed by: INTERNAL MEDICINE

## 2025-06-03 PROCEDURE — 1036F TOBACCO NON-USER: CPT | Performed by: INTERNAL MEDICINE

## 2025-06-03 PROCEDURE — 99215 OFFICE O/P EST HI 40 MIN: CPT | Performed by: INTERNAL MEDICINE

## 2025-06-03 PROCEDURE — 93000 ELECTROCARDIOGRAM COMPLETE: CPT | Mod: DISTINCT PROCEDURAL SERVICE | Performed by: INTERNAL MEDICINE

## 2025-06-03 PROCEDURE — 3078F DIAST BP <80 MM HG: CPT | Performed by: INTERNAL MEDICINE

## 2025-06-03 PROCEDURE — 3074F SYST BP LT 130 MM HG: CPT | Performed by: INTERNAL MEDICINE

## 2025-06-03 ASSESSMENT — ENCOUNTER SYMPTOMS
DYSPNEA ON EXERTION: 0
PALPITATIONS: 0

## 2025-06-03 NOTE — PATIENT INSTRUCTIONS
Continue same medications/treatment.  Patient educated on proper medication use.  Patient educated on risk factor modification.  Please bring any lab results from other providers/physicians to your next appointment.    Please bring all medicines, vitamins, and herbal supplements with you when you come to the office.    Prescriptions will not be filled unless you are compliant with your follow up appointments or have a follow up appointment scheduled as per instruction of your physician. Refills should be requested at the time of your visit.    Follow up with our physician assistant, Zakiya, in 4 months with device check   Continue remote checks as scheduled    CATHERINE HURT RN, AM SCRIBING FOR AND IN THE PRESENCE OF DR. NYDIA KHAN MD, FACC, FACP, FHRS

## 2025-06-05 PROCEDURE — RXMED WILLOW AMBULATORY MEDICATION CHARGE

## 2025-06-06 ENCOUNTER — SPECIALTY PHARMACY (OUTPATIENT)
Dept: PHARMACY | Facility: CLINIC | Age: 80
End: 2025-06-06

## 2025-06-11 ENCOUNTER — PHARMACY VISIT (OUTPATIENT)
Dept: PHARMACY | Facility: CLINIC | Age: 80
End: 2025-06-11
Payer: COMMERCIAL

## 2025-06-20 ENCOUNTER — APPOINTMENT (OUTPATIENT)
Dept: CARDIOLOGY | Facility: CLINIC | Age: 80
End: 2025-06-20
Payer: MEDICARE

## 2025-06-24 ENCOUNTER — APPOINTMENT (OUTPATIENT)
Dept: CARDIOLOGY | Facility: CLINIC | Age: 80
End: 2025-06-24
Payer: MEDICARE

## 2025-06-24 VITALS
HEIGHT: 66 IN | BODY MASS INDEX: 40.98 KG/M2 | WEIGHT: 255 LBS | HEART RATE: 64 BPM | DIASTOLIC BLOOD PRESSURE: 62 MMHG | SYSTOLIC BLOOD PRESSURE: 120 MMHG

## 2025-06-24 DIAGNOSIS — I50.22 CHRONIC SYSTOLIC CONGESTIVE HEART FAILURE: ICD-10-CM

## 2025-06-24 DIAGNOSIS — I25.2 PAST MYOCARDIAL INFARCTION: ICD-10-CM

## 2025-06-24 DIAGNOSIS — I25.10 ASHD (ARTERIOSCLEROTIC HEART DISEASE): ICD-10-CM

## 2025-06-24 DIAGNOSIS — Z87.891 FORMER CIGARETTE SMOKER: ICD-10-CM

## 2025-06-24 DIAGNOSIS — Z98.890 S/P MITRAL VALVE CLIP IMPLANTATION: ICD-10-CM

## 2025-06-24 DIAGNOSIS — I65.23 BILATERAL CAROTID ARTERY STENOSIS: ICD-10-CM

## 2025-06-24 DIAGNOSIS — Z95.810 ICD (IMPLANTABLE CARDIOVERTER-DEFIBRILLATOR), BIVENTRICULAR, IN SITU: ICD-10-CM

## 2025-06-24 DIAGNOSIS — I50.32 CHRONIC DIASTOLIC HEART FAILURE: ICD-10-CM

## 2025-06-24 DIAGNOSIS — E78.2 MIXED HYPERLIPIDEMIA: ICD-10-CM

## 2025-06-24 DIAGNOSIS — Z95.818 S/P MITRAL VALVE CLIP IMPLANTATION: ICD-10-CM

## 2025-06-24 DIAGNOSIS — N18.32 STAGE 3B CHRONIC KIDNEY DISEASE (MULTI): ICD-10-CM

## 2025-06-24 DIAGNOSIS — I10 BENIGN ESSENTIAL HYPERTENSION: ICD-10-CM

## 2025-06-24 DIAGNOSIS — Z86.79 HISTORY OF ISCHEMIC CARDIOMYOPATHY: ICD-10-CM

## 2025-06-24 DIAGNOSIS — E11.21 DIABETES MELLITUS WITH KIDNEY DISEASE (MULTI): ICD-10-CM

## 2025-06-24 DIAGNOSIS — R07.9 CHEST PAIN, UNSPECIFIED TYPE: ICD-10-CM

## 2025-06-24 DIAGNOSIS — I34.0 MITRAL VALVE INSUFFICIENCY, UNSPECIFIED ETIOLOGY: ICD-10-CM

## 2025-06-24 DIAGNOSIS — D50.9 IRON DEFICIENCY ANEMIA, UNSPECIFIED IRON DEFICIENCY ANEMIA TYPE: ICD-10-CM

## 2025-06-24 LAB
ALBUMIN SERPL-MCNC: 4 G/DL (ref 3.6–5.1)
ALP SERPL-CCNC: 62 U/L (ref 35–144)
ALT SERPL-CCNC: 16 U/L (ref 9–46)
ANION GAP SERPL CALCULATED.4IONS-SCNC: 13 MMOL/L (CALC) (ref 7–17)
AST SERPL-CCNC: 25 U/L (ref 10–35)
BASOPHILS # BLD AUTO: 9 CELLS/UL (ref 0–200)
BASOPHILS NFR BLD AUTO: 0.2 %
BILIRUB SERPL-MCNC: 0.8 MG/DL (ref 0.2–1.2)
BUN SERPL-MCNC: 22 MG/DL (ref 7–25)
CALCIUM SERPL-MCNC: 8.8 MG/DL (ref 8.6–10.3)
CHLORIDE SERPL-SCNC: 96 MMOL/L (ref 98–110)
CHOLEST SERPL-MCNC: 134 MG/DL
CHOLEST/HDLC SERPL: 3.7 (CALC)
CO2 SERPL-SCNC: 28 MMOL/L (ref 20–32)
CREAT SERPL-MCNC: 1.42 MG/DL (ref 0.7–1.22)
EGFRCR SERPLBLD CKD-EPI 2021: 50 ML/MIN/1.73M2
EOSINOPHIL # BLD AUTO: 19 CELLS/UL (ref 15–500)
EOSINOPHIL NFR BLD AUTO: 0.4 %
ERYTHROCYTE [DISTWIDTH] IN BLOOD BY AUTOMATED COUNT: 13.9 % (ref 11–15)
EST. AVERAGE GLUCOSE BLD GHB EST-MCNC: 128 MG/DL
EST. AVERAGE GLUCOSE BLD GHB EST-SCNC: 7.1 MMOL/L
GLUCOSE SERPL-MCNC: 169 MG/DL (ref 65–139)
HBA1C MFR BLD: 6.1 %
HCT VFR BLD AUTO: 41.3 % (ref 38.5–50)
HDLC SERPL-MCNC: 36 MG/DL
HGB BLD-MCNC: 13.6 G/DL (ref 13.2–17.1)
LDLC SERPL CALC-MCNC: 75 MG/DL (CALC)
LDLC SERPL DIRECT ASSAY-MCNC: 89 MG/DL
LYMPHOCYTES # BLD AUTO: 752 CELLS/UL (ref 850–3900)
LYMPHOCYTES NFR BLD AUTO: 16 %
MCH RBC QN AUTO: 32.1 PG (ref 27–33)
MCHC RBC AUTO-ENTMCNC: 32.9 G/DL (ref 32–36)
MCV RBC AUTO: 97.4 FL (ref 80–100)
MONOCYTES # BLD AUTO: 902 CELLS/UL (ref 200–950)
MONOCYTES NFR BLD AUTO: 19.2 %
NEUTROPHILS # BLD AUTO: 3017 CELLS/UL (ref 1500–7800)
NEUTROPHILS NFR BLD AUTO: 64.2 %
NONHDLC SERPL-MCNC: 98 MG/DL (CALC)
PLATELET # BLD AUTO: 181 THOUSAND/UL (ref 140–400)
PMV BLD REES-ECKER: 13.1 FL (ref 7.5–12.5)
POTASSIUM SERPL-SCNC: 3.8 MMOL/L (ref 3.5–5.3)
PROT SERPL-MCNC: 6.5 G/DL (ref 6.1–8.1)
RBC # BLD AUTO: 4.24 MILLION/UL (ref 4.2–5.8)
SODIUM SERPL-SCNC: 137 MMOL/L (ref 135–146)
TRIGL SERPL-MCNC: 143 MG/DL
WBC # BLD AUTO: 4.7 THOUSAND/UL (ref 3.8–10.8)

## 2025-06-24 PROCEDURE — 1159F MED LIST DOCD IN RCRD: CPT | Performed by: INTERNAL MEDICINE

## 2025-06-24 PROCEDURE — 3078F DIAST BP <80 MM HG: CPT | Performed by: INTERNAL MEDICINE

## 2025-06-24 PROCEDURE — 3074F SYST BP LT 130 MM HG: CPT | Performed by: INTERNAL MEDICINE

## 2025-06-24 PROCEDURE — 1036F TOBACCO NON-USER: CPT | Performed by: INTERNAL MEDICINE

## 2025-06-24 PROCEDURE — 99213 OFFICE O/P EST LOW 20 MIN: CPT | Performed by: INTERNAL MEDICINE

## 2025-06-24 RX ORDER — NITROGLYCERIN 0.4 MG/1
TABLET SUBLINGUAL
Qty: 25 TABLET | Refills: 3 | Status: SHIPPED | OUTPATIENT
Start: 2025-06-24

## 2025-06-24 NOTE — PROGRESS NOTES
Referred by Dr. Medina ref. provider found provider found for   Chief Complaint   Patient presents with    Follow-up     6 month follow-up for management of arteriosclerotic heart disease, mitral valve regurgitation, carotid artery stenosis chronic heart failure & hyperlipidemia        Chief complaint:   Chief Complaint   Patient presents with    Follow-up     6 month follow-up for management of arteriosclerotic heart disease, mitral valve regurgitation, carotid artery stenosis chronic heart failure & hyperlipidemia        History of Present Illness  Nilo Benavidez Jr. is a 80 y.o. year old male patient follow-up.  Doing well from a cardiac standpoint no complaint no symptoms of chest pain or shortness of breath.  Denies any symptoms of palpitations syncope or presyncope.  Discussed with the patient will continue current medical management will call for any problem and follow-up as scheduled    Past Medical History  Medical History[1]    Social History  Social History[2]    Family History   Family History[3]    Review of Systems  As per HPI, all other systems reviewed and negative.    Allergies:  RX Allergies[4]     Outpatient Medications:  Current Outpatient Medications   Medication Instructions    acetaminophen (TYLENOL) 500 mg, Every 6 hours PRN    albuterol 90 mcg/actuation inhaler 2 puffs, Every 4 hours PRN    allopurinol (ZYLOPRIM) 200 mg, oral, Daily    ascorbic acid (VITAMIN C) 500 mg, Daily    blood sugar diagnostic (Blood Glucose Test) strip Test twice daily.    blood sugar diagnostic (True Metrix Glucose Test Strip) strip USE ONE STRIP TWICE DAILY    carvedilol (COREG) 25 mg, oral, 2 times daily    cholecalciferol (VITAMIN D3) 2,000 Units, 4 times daily    ferrous sulfate 325 mg, Daily with breakfast    fluocinonide (Lidex) 0.05 % cream As needed    fluticasone (Flonase) 50 mcg/actuation nasal spray 1 spray, Each Nostril, Daily, Shake gently. Before first use, prime pump. After use, clean tip and replace  cap.    fluticasone (Flonase) 50 mcg/actuation nasal spray 1 spray, Each Nostril, Daily, Shake gently. Before first use, prime pump. After use, clean tip and replace cap.    gabapentin (NEURONTIN) 300 mg, oral    gabapentin (NEURONTIN) 100 mg, oral, Daily    glimepiride (AMARYL) 4 mg, oral, 2 times daily    ipratropium-albuteroL (Duo-Neb) 0.5-2.5 mg/3 mL nebulizer solution 3 mL, nebulization, Every 6 hours RT    Jardiance 10 mg, oral, Daily    lancets misc Use to test once daily as directed.    losartan (COZAAR) 25 mg, oral, Daily    magnesium oxide (MAG-OX) 400 mg, Daily    methocarbamol (Robaxin) 500 mg tablet     metOLazone (Zaroxolyn) 2.5 mg tablet TAKE 1 TABLET BY MOUTH ONCE  DAILY IF NEEDED FOR CHF  EXACERBATION    mometasone (Elocon) 0.1 % cream Topical, 2 times daily    montelukast (SINGULAIR) 10 mg, Nightly    nitroglycerin (Nitrostat) 0.4 mg SL tablet PLACE 1 TABLET UNDER THE TONGUE EVERY 5 MINUTES FOR UP TO 3 DOSES AS NEEDED FOR CHEST PAIN. CALL 911 IF PAIN PERSISTS.    pantoprazole (PROTONIX) 40 mg, oral, Daily    simvastatin (ZOCOR) 20 mg, oral, Nightly    spironolactone (ALDACTONE) 25 mg, oral, Daily    torsemide (DEMADEX) 50 mg, oral, 2 times daily    traMADol (Ultram) 50 mg tablet          Vitals:  Vitals:    06/24/25 1419   BP: 120/62   Pulse: 64       Physical Exam:  Physical Exam  Vitals and nursing note reviewed.   Constitutional:       Appearance: Normal appearance.   HENT:      Head: Normocephalic and atraumatic.   Eyes:      Extraocular Movements: Extraocular movements intact.      Pupils: Pupils are equal, round, and reactive to light.   Cardiovascular:      Rate and Rhythm: Normal rate and regular rhythm.      Pulses: Normal pulses.   Pulmonary:      Effort: Pulmonary effort is normal.      Breath sounds: Normal breath sounds.   Musculoskeletal:         General: Normal range of motion.      Cervical back: Normal range of motion.      Right lower leg: No edema.      Left lower leg: No edema.    Skin:     General: Skin is warm and dry.   Neurological:      General: No focal deficit present.      Mental Status: He is alert and oriented to person, place, and time.             Assessment/Plan   Diagnoses and all orders for this visit:  ASHD (arteriosclerotic heart disease)  Benign essential hypertension  Bilateral carotid artery stenosis  Chronic diastolic heart failure  Chronic systolic congestive heart failure  ICD (implantable cardioverter-defibrillator), biventricular, in situ  Mitral valve insufficiency, unspecified etiology  S/P mitral valve clip implantation  Past myocardial infarction  Mixed hyperlipidemia  Diabetes mellitus with kidney disease (Multi)  Stage 3b chronic kidney disease (Multi)  Iron deficiency anemia, unspecified iron deficiency anemia type  History of ischemic cardiomyopathy  BMI 40.0-44.9, adult (Multi)  Former cigarette smoker  Chest pain, unspecified type          I,Anne Marie Fitzgerald LPN am scribing for, and in the presence of Bhanu Ricci MD.    IBhanu M.D., personally performed the services described in the documentation as scribed by Anne Marie Fitzgerald LPN in my presence, and confirm it is both accurate and complete.      Bhanu Ricci MD [facility]  Interventional Cardiology  Thank you for allowing me to participate in the care of this patient. Please do not hesitate to contact me with any further questions or concerns.         [1]   Past Medical History:  Diagnosis Date    Anemia     Arthritis     BPH (benign prostatic hyperplasia)     Cardiomyopathy     Cataract     CHF (congestive heart failure)     CKD (chronic kidney disease)     Colon polyp     COPD (chronic obstructive pulmonary disease) (Multi)     COVID-19     VACCINATED    Does mobilize using cane     GERD (gastroesophageal reflux disease)     GI (gastrointestinal bleed)     UPPER    Gout     Hearing aid worn     Heart failure     History of blood transfusion     Hyperlipidemia     Hyperparathyroidism (Multi)      Hypertension     Hypokalemia     Insomnia     Joint pain     LBBB (left bundle branch block)     Mitral regurgitation     Myocardial infarction (Multi)     Other ventricular tachycardia     Ventricular tachycardia (paroxysmal)    Personal history of diseases of the blood and blood-forming organs and certain disorders involving the immune mechanism 2021    History of bleeding disorder    Personal history of other diseases of the circulatory system 2021    History of cardiac disorder    Personal history of other diseases of the musculoskeletal system and connective tissue 2021    History of arthritis    Personal history of other specified conditions 2021    History of chest pain    PSVT (paroxysmal supraventricular tachycardia)     Sacroiliitis     Sleep apnea     USES CPAP    Splenomegaly     Type 2 diabetes mellitus     Uses walker     ONLY USES FOR LONG DISTANCES    Wears dentures    [2]   Social History  Tobacco Use    Smoking status: Former     Current packs/day: 0.00     Average packs/day: 0.5 packs/day for 43.0 years (21.5 ttl pk-yrs)     Types: Cigarettes     Start date:      Quit date:      Years since quittin.4    Smokeless tobacco: Never   Vaping Use    Vaping status: Never Used   Substance Use Topics    Alcohol use: Yes     Comment: OCCASIONAL    Drug use: Never   [3]   Family History  Problem Relation Name Age of Onset    Diabetes Mother      Valvular heart disease Mother      Hypertension Father     [4]   Allergies  Allergen Reactions    Caffeine Other     Hypotension    Codeine GI Upset and Nausea/vomiting     Codeine Derivatives    Lisinopril Cough     SEVERE    Penicillins GI Upset    Shellfish Containing Products GI Upset and Nausea/vomiting     LOBSTER, CRABS, SCALLOPS

## 2025-06-27 ENCOUNTER — APPOINTMENT (OUTPATIENT)
Dept: PRIMARY CARE | Facility: CLINIC | Age: 80
End: 2025-06-27
Payer: MEDICARE

## 2025-06-27 VITALS
DIASTOLIC BLOOD PRESSURE: 76 MMHG | WEIGHT: 253 LBS | SYSTOLIC BLOOD PRESSURE: 132 MMHG | HEART RATE: 66 BPM | HEIGHT: 66 IN | BODY MASS INDEX: 40.66 KG/M2 | OXYGEN SATURATION: 98 % | TEMPERATURE: 96.5 F

## 2025-06-27 DIAGNOSIS — Z95.810 ICD (IMPLANTABLE CARDIOVERTER-DEFIBRILLATOR), BIVENTRICULAR, IN SITU: ICD-10-CM

## 2025-06-27 DIAGNOSIS — K21.9 GASTROESOPHAGEAL REFLUX DISEASE, UNSPECIFIED WHETHER ESOPHAGITIS PRESENT: ICD-10-CM

## 2025-06-27 DIAGNOSIS — Z98.890 S/P MITRAL VALVE CLIP IMPLANTATION: ICD-10-CM

## 2025-06-27 DIAGNOSIS — Z86.79 HISTORY OF ISCHEMIC CARDIOMYOPATHY: ICD-10-CM

## 2025-06-27 DIAGNOSIS — Z95.818 S/P MITRAL VALVE CLIP IMPLANTATION: ICD-10-CM

## 2025-06-27 DIAGNOSIS — I10 BENIGN ESSENTIAL HYPERTENSION: ICD-10-CM

## 2025-06-27 DIAGNOSIS — I25.10 ASHD (ARTERIOSCLEROTIC HEART DISEASE): ICD-10-CM

## 2025-06-27 DIAGNOSIS — M10.9 GOUT, UNSPECIFIED CAUSE, UNSPECIFIED CHRONICITY, UNSPECIFIED SITE: ICD-10-CM

## 2025-06-27 DIAGNOSIS — N25.81 SECONDARY HYPERPARATHYROIDISM (MULTI): ICD-10-CM

## 2025-06-27 DIAGNOSIS — J42 CHRONIC BRONCHITIS, UNSPECIFIED CHRONIC BRONCHITIS TYPE (MULTI): ICD-10-CM

## 2025-06-27 DIAGNOSIS — I10 ESSENTIAL HYPERTENSION, BENIGN: ICD-10-CM

## 2025-06-27 DIAGNOSIS — E11.21 DIABETES MELLITUS WITH KIDNEY DISEASE (MULTI): Primary | ICD-10-CM

## 2025-06-27 DIAGNOSIS — N18.32 STAGE 3B CHRONIC KIDNEY DISEASE (MULTI): ICD-10-CM

## 2025-06-27 PROBLEM — N18.4 STAGE 4 CHRONIC KIDNEY DISEASE (MULTI): Status: RESOLVED | Noted: 2024-03-19 | Resolved: 2025-06-27

## 2025-06-27 PROCEDURE — G2211 COMPLEX E/M VISIT ADD ON: HCPCS | Performed by: INTERNAL MEDICINE

## 2025-06-27 PROCEDURE — 1036F TOBACCO NON-USER: CPT | Performed by: INTERNAL MEDICINE

## 2025-06-27 PROCEDURE — 99214 OFFICE O/P EST MOD 30 MIN: CPT | Performed by: INTERNAL MEDICINE

## 2025-06-27 PROCEDURE — 1159F MED LIST DOCD IN RCRD: CPT | Performed by: INTERNAL MEDICINE

## 2025-06-27 PROCEDURE — 3075F SYST BP GE 130 - 139MM HG: CPT | Performed by: INTERNAL MEDICINE

## 2025-06-27 PROCEDURE — 3078F DIAST BP <80 MM HG: CPT | Performed by: INTERNAL MEDICINE

## 2025-06-27 SDOH — HEALTH STABILITY: PHYSICAL HEALTH: ON AVERAGE, HOW MANY MINUTES DO YOU ENGAGE IN EXERCISE AT THIS LEVEL?: 30 MIN

## 2025-06-27 SDOH — HEALTH STABILITY: PHYSICAL HEALTH: ON AVERAGE, HOW MANY DAYS PER WEEK DO YOU ENGAGE IN MODERATE TO STRENUOUS EXERCISE (LIKE A BRISK WALK)?: 2 DAYS

## 2025-06-27 ASSESSMENT — ENCOUNTER SYMPTOMS
CONSTITUTIONAL NEGATIVE: 1
NEUROLOGICAL NEGATIVE: 1
GASTROINTESTINAL NEGATIVE: 1
PSYCHIATRIC NEGATIVE: 1
CARDIOVASCULAR NEGATIVE: 1
RESPIRATORY NEGATIVE: 1
HEMATOLOGIC/LYMPHATIC NEGATIVE: 1
ENDOCRINE COMMENTS: DM2

## 2025-06-27 ASSESSMENT — LIFESTYLE VARIABLES
HOW OFTEN DO YOU HAVE A DRINK CONTAINING ALCOHOL: NEVER
SKIP TO QUESTIONS 9-10: 1
HOW MANY STANDARD DRINKS CONTAINING ALCOHOL DO YOU HAVE ON A TYPICAL DAY: PATIENT DOES NOT DRINK
HOW OFTEN DO YOU HAVE SIX OR MORE DRINKS ON ONE OCCASION: NEVER
AUDIT-C TOTAL SCORE: 0

## 2025-06-27 NOTE — PROGRESS NOTES
"Subjective   Patient ID: Josue Benavidez Jr. is a 80 y.o. male who presents for Follow-up (Patient here for follow up with no concerns or issues at the moment.), Hypertension, and Congestive Heart Failure.    HPI   Patient here for follow-up he has history of diabetes anemia hyperlipidemia chronic kidney disease and CHF blood work from last time reviewed and his numbers are in good range.  A1c creatinine GFR is in good range stage III chronic kidney disease  Review of Systems   Constitutional: Negative.    HENT: Negative.     Respiratory: Negative.     Cardiovascular: Negative.         CHF   Gastrointestinal: Negative.    Endocrine:        DM2   Genitourinary: Negative.    Neurological: Negative.    Hematological: Negative.    Psychiatric/Behavioral: Negative.     All other systems reviewed and are negative.      Objective   /76 (BP Location: Left arm, Patient Position: Sitting, BP Cuff Size: Large adult)   Pulse 66   Temp 35.8 °C (96.5 °F) (Temporal)   Ht 1.676 m (5' 6\")   Wt 115 kg (253 lb)   SpO2 98%   BMI 40.84 kg/m²     Physical Exam  Vitals reviewed.   Constitutional:       Appearance: Normal appearance.   HENT:      Head: Normocephalic.   Cardiovascular:      Rate and Rhythm: Normal rate and regular rhythm.      Pulses: Normal pulses.      Heart sounds: Normal heart sounds.   Pulmonary:      Effort: Pulmonary effort is normal.      Breath sounds: Normal breath sounds.   Musculoskeletal:      Right lower leg: No edema.      Left lower leg: No edema.   Neurological:      General: No focal deficit present.      Mental Status: He is alert and oriented to person, place, and time. Mental status is at baseline.       Assessment/Plan   Problem List Items Addressed This Visit           ICD-10-CM    ASHD (arteriosclerotic heart disease) I25.10    Benign essential hypertension I10    Diabetes mellitus with kidney disease (Multi) - Primary E11.21    History of ischemic cardiomyopathy Z86.79    ICD (implantable " cardioverter-defibrillator), biventricular, in situ Z95.810    S/P mitral valve clip implantation Z98.890, Z95.818    Stage 3b chronic kidney disease (Multi) N18.32    Essential hypertension, benign I10    Secondary hyperparathyroidism (Multi) N25.81     Patient has chronic kidney disease secondary hyperparathyroidism secondary to chronic kidney disease also history of diabetes and hypertension medications reviewed continue current dose of diuretics.  He is on pantoprazole for acid reflux.  Continue statins for hyperlipidemia.  Continue glimepiride for diabetes.

## 2025-06-30 RX ORDER — ALLOPURINOL 100 MG/1
200 TABLET ORAL DAILY
Qty: 180 TABLET | Refills: 1 | Status: SHIPPED | OUTPATIENT
Start: 2025-06-30

## 2025-06-30 RX ORDER — PANTOPRAZOLE SODIUM 40 MG/1
40 TABLET, DELAYED RELEASE ORAL DAILY
Qty: 90 TABLET | Refills: 1 | Status: SHIPPED | OUTPATIENT
Start: 2025-06-30

## 2025-07-08 ENCOUNTER — HOSPITAL ENCOUNTER (OUTPATIENT)
Dept: CARDIOLOGY | Facility: HOSPITAL | Age: 80
Discharge: HOME | End: 2025-07-08
Payer: MEDICARE

## 2025-07-08 DIAGNOSIS — Z95.810 ICD (IMPLANTABLE CARDIOVERTER-DEFIBRILLATOR), BIVENTRICULAR, IN SITU: ICD-10-CM

## 2025-07-19 DIAGNOSIS — E78.2 MIXED HYPERLIPIDEMIA: ICD-10-CM

## 2025-07-19 DIAGNOSIS — I10 ESSENTIAL HYPERTENSION, BENIGN: ICD-10-CM

## 2025-07-19 DIAGNOSIS — I10 PRIMARY HYPERTENSION: ICD-10-CM

## 2025-07-21 RX ORDER — CARVEDILOL 25 MG/1
25 TABLET ORAL 2 TIMES DAILY
Qty: 180 TABLET | Refills: 3 | Status: SHIPPED | OUTPATIENT
Start: 2025-07-21 | End: 2026-07-21

## 2025-07-21 RX ORDER — LOSARTAN POTASSIUM 25 MG/1
25 TABLET ORAL DAILY
Qty: 90 TABLET | Refills: 3 | Status: SHIPPED | OUTPATIENT
Start: 2025-07-21 | End: 2026-07-21

## 2025-07-21 RX ORDER — SIMVASTATIN 20 MG/1
20 TABLET, FILM COATED ORAL NIGHTLY
Qty: 90 TABLET | Refills: 3 | Status: SHIPPED | OUTPATIENT
Start: 2025-07-21 | End: 2026-07-21

## 2025-07-21 NOTE — TELEPHONE ENCOUNTER
Received request for prescription refills for patient.   Patient follows with Dr. Ricci    Request is for carvedilol  Is patient currently on medication yes    Request is for losartan  Is patient currently on medication yes    Request is for simvastatin  Is patient currently on medication yes    Last OV 6/24/2025  Next OV 3/24/2026    Pended for signing and sent to provider

## 2025-07-29 DIAGNOSIS — E11.69 TYPE 2 DIABETES MELLITUS WITH OTHER SPECIFIED COMPLICATION, WITHOUT LONG-TERM CURRENT USE OF INSULIN: ICD-10-CM

## 2025-07-30 RX ORDER — GLIMEPIRIDE 4 MG/1
4 TABLET ORAL 2 TIMES DAILY
Qty: 200 TABLET | Refills: 2 | Status: SHIPPED | OUTPATIENT
Start: 2025-07-30

## 2025-08-12 ENCOUNTER — HOSPITAL ENCOUNTER (OUTPATIENT)
Dept: CARDIOLOGY | Facility: HOSPITAL | Age: 80
Discharge: HOME | End: 2025-08-12
Payer: MEDICARE

## 2025-08-12 DIAGNOSIS — Z95.810 ICD (IMPLANTABLE CARDIOVERTER-DEFIBRILLATOR), BIVENTRICULAR, IN SITU: ICD-10-CM

## 2025-08-15 DIAGNOSIS — J30.9 ALLERGIC RHINITIS, UNSPECIFIED SEASONALITY, UNSPECIFIED TRIGGER: ICD-10-CM

## 2025-08-15 DIAGNOSIS — R07.9 CHEST PAIN, UNSPECIFIED TYPE: ICD-10-CM

## 2025-08-15 DIAGNOSIS — M47.819 SPINAL ARTHRITIS: ICD-10-CM

## 2025-08-18 RX ORDER — GABAPENTIN 300 MG/1
300 CAPSULE ORAL
Qty: 100 CAPSULE | Refills: 2 | Status: SHIPPED | OUTPATIENT
Start: 2025-08-18

## 2025-08-18 RX ORDER — NITROGLYCERIN 0.4 MG/1
0.4 TABLET SUBLINGUAL EVERY 5 MIN PRN
Qty: 50 TABLET | Refills: 2 | Status: SHIPPED | OUTPATIENT
Start: 2025-08-18 | End: 2026-08-18

## 2025-08-18 RX ORDER — GABAPENTIN 100 MG/1
100 CAPSULE ORAL DAILY
Qty: 100 CAPSULE | Refills: 2 | Status: SHIPPED | OUTPATIENT
Start: 2025-08-18

## 2025-08-18 RX ORDER — FLUTICASONE PROPIONATE 50 MCG
SPRAY, SUSPENSION (ML) NASAL
Qty: 32 G | Refills: 1 | Status: SHIPPED | OUTPATIENT
Start: 2025-08-18

## 2025-10-07 ENCOUNTER — APPOINTMENT (OUTPATIENT)
Dept: CARDIOLOGY | Facility: CLINIC | Age: 80
End: 2025-10-07
Payer: MEDICARE

## 2025-10-27 ENCOUNTER — APPOINTMENT (OUTPATIENT)
Dept: PRIMARY CARE | Facility: CLINIC | Age: 80
End: 2025-10-27
Payer: MEDICARE

## 2026-03-24 ENCOUNTER — APPOINTMENT (OUTPATIENT)
Dept: CARDIOLOGY | Facility: CLINIC | Age: 81
End: 2026-03-24
Payer: MEDICARE

## (undated) DEVICE — BOWL, BASIN, 32 OZ, STERILE

## (undated) DEVICE — GLOVE, SURGICAL, PROTEXIS NEOPRENE, 8.0, PF, LF

## (undated) DEVICE — SOLUTION, IRRIGATION, STERILE WATER, 1000 ML, POUR BOTTLE

## (undated) DEVICE — TOWEL PACK, STERILE, 16X24, XRAY DETECTABLE, BLUE, 4/PK

## (undated) DEVICE — APPLICATOR, CHLORAPREP, W/ORANGE TINT, 26ML

## (undated) DEVICE — DRESSING, GAUZE, SPONGE, 12 PLY, 4 X 4 IN, PLASTIC POUCH, STRL 10PK

## (undated) DEVICE — LABEL KIT, MEDICATION, OR EMC, STERILE

## (undated) DEVICE — DRAPE, SHEET, THREE QUARTER, FAN FOLD, 57 X 77 IN

## (undated) DEVICE — BANDAGE, ADHESIVE, FLEXIBLE FABRIC, 1 X 3

## (undated) DEVICE — STRAP, VELCRO, BODY, 4 X 60IN, NS

## (undated) DEVICE — NEEDLE, SAFETY, 25 GA X 1.5 IN

## (undated) DEVICE — SYRINGE, 10 CC, LUER LOCK

## (undated) DEVICE — NEEDLE, SAFETY, 18 G X 1.5 IN

## (undated) DEVICE — CUP, MEDICINE, GRADUATED, 2 OZ, PLASTIC, DISP, LF